# Patient Record
Sex: MALE | Race: WHITE | Employment: OTHER | URBAN - METROPOLITAN AREA
[De-identification: names, ages, dates, MRNs, and addresses within clinical notes are randomized per-mention and may not be internally consistent; named-entity substitution may affect disease eponyms.]

---

## 2017-07-26 PROCEDURE — 82607 VITAMIN B-12: CPT | Performed by: INTERNAL MEDICINE

## 2017-07-26 PROCEDURE — 82746 ASSAY OF FOLIC ACID SERUM: CPT | Performed by: INTERNAL MEDICINE

## 2021-10-05 PROBLEM — C34.90 NON-SMALL CELL LUNG CANCER (HCC): Status: ACTIVE | Noted: 2021-10-05

## 2021-10-05 PROBLEM — C90.00 MULTIPLE MYELOMA (HCC): Status: ACTIVE | Noted: 2021-10-05

## 2021-10-19 PROBLEM — C90.00 MULTIPLE MYELOMA NOT HAVING ACHIEVED REMISSION (HCC): Status: ACTIVE | Noted: 2021-10-19

## 2021-10-19 PROBLEM — C90.00 MULTIPLE MYELOMA NOT HAVING ACHIEVED REMISSION (HCC): Status: ACTIVE | Noted: 2021-01-01

## 2021-10-25 ENCOUNTER — APPOINTMENT (OUTPATIENT)
Dept: GENERAL RADIOLOGY | Facility: HOSPITAL | Age: 86
DRG: 871 | End: 2021-10-25
Attending: EMERGENCY MEDICINE
Payer: MEDICARE

## 2021-10-25 ENCOUNTER — APPOINTMENT (OUTPATIENT)
Dept: CT IMAGING | Facility: HOSPITAL | Age: 86
DRG: 871 | End: 2021-10-25
Attending: EMERGENCY MEDICINE
Payer: MEDICARE

## 2021-10-25 ENCOUNTER — HOSPITAL ENCOUNTER (INPATIENT)
Facility: HOSPITAL | Age: 86
LOS: 3 days | Discharge: HOME HEALTH | DRG: 871 | End: 2021-10-29
Attending: EMERGENCY MEDICINE | Admitting: INTERNAL MEDICINE
Payer: MEDICARE

## 2021-10-25 DIAGNOSIS — I48.91 ATRIAL FIBRILLATION WITH RVR (HCC): ICD-10-CM

## 2021-10-25 DIAGNOSIS — N39.0 URINARY TRACT INFECTION WITH HEMATURIA, SITE UNSPECIFIED: ICD-10-CM

## 2021-10-25 DIAGNOSIS — A41.9 SEPSIS DUE TO UNDETERMINED ORGANISM (HCC): ICD-10-CM

## 2021-10-25 DIAGNOSIS — R31.9 URINARY TRACT INFECTION WITH HEMATURIA, SITE UNSPECIFIED: ICD-10-CM

## 2021-10-25 DIAGNOSIS — J18.9 MULTIFOCAL PNEUMONIA: Primary | ICD-10-CM

## 2021-10-25 PROCEDURE — 3E033XZ INTRODUCTION OF VASOPRESSOR INTO PERIPHERAL VEIN, PERCUTANEOUS APPROACH: ICD-10-PCS | Performed by: EMERGENCY MEDICINE

## 2021-10-25 PROCEDURE — 71045 X-RAY EXAM CHEST 1 VIEW: CPT | Performed by: EMERGENCY MEDICINE

## 2021-10-25 PROCEDURE — 72110 X-RAY EXAM L-2 SPINE 4/>VWS: CPT | Performed by: EMERGENCY MEDICINE

## 2021-10-25 PROCEDURE — 70450 CT HEAD/BRAIN W/O DYE: CPT | Performed by: EMERGENCY MEDICINE

## 2021-10-25 RX ORDER — ACETAMINOPHEN 500 MG
1000 TABLET ORAL ONCE
Status: COMPLETED | OUTPATIENT
Start: 2021-10-25 | End: 2021-10-25

## 2021-10-25 NOTE — ED INITIAL ASSESSMENT (HPI)
PT TO ED FROM [facility],PER WIFE PT LETHARGIC, WEAKNESS, WITH CONFUSION X 3 HOURS. PER MEDICS PT FELL LAST WEEK OUT OF CHAIR AND FELL ON ALL 4'S, TWO DAYS LATER PT FELL OUT OF CHAIR AGAIN, PER FAMILY PT DID NOT HIT HEAD OR ANY LOC.  PT ON BANDAR

## 2021-10-25 NOTE — ED PROVIDER NOTES
Patient Seen in: [facility] Emergency Department      History   Patient presents with:  Altered Mental Status    Stated Complaint: ams     Subjective:   HPI    50-year-old male with past medical history of multiple myeloma, a fib on xarelto presents 1443 Oral   SpO2 10/25/21 1440 95 %   O2 Device 10/25/21 1440 None (Room air)       Current:/51   Pulse 114   Temp (!) 100.7 °F (38.2 °C) (Oral)   Resp (!) 30   Ht 182.9 cm (6')   Wt 62.1 kg   SpO2 92%   BMI 18.57 kg/m²         Physical Exam  Vitals Ratio 0.7 (*)     All other components within normal limits   LACTIC ACID, PLASMA - Abnormal; Notable for the following components:    Lactic Acid 2.3 (*)     All other components within normal limits   PROTHROMBIN TIME (PT) - Abnormal; Notable for the fol the ACR (American College of Radiology) NRDR ([address]) which includes the Dose Index Registry. PATIENT STATED HISTORY: (As transcribed by Technologist)  Patient with confusion and lethargy. FINDINGS:  No bleed or mass effect. CONCLUSION:  1. Multifocal consolidation in lower lungs could represent multifocal pneumonia. 2. Right mediastinal mass is suggested. This could be a tortuous and atherosclerotic aorta. If indicated CT would be more specific.  3. There is a right-side is from a UTI. Family also offers that patient suffered a fall off her recliner 1 week ago. He had some pain near his right SI joint. He states the pain now has improved. No CVA. Lumbar x-rays ordered to assess for bony abnormality.   Patient with no mid

## 2021-10-26 PROBLEM — I48.91 ATRIAL FIBRILLATION WITH RVR (HCC): Status: ACTIVE | Noted: 2021-01-01

## 2021-10-26 PROBLEM — R31.9 URINARY TRACT INFECTION WITH HEMATURIA, SITE UNSPECIFIED: Status: ACTIVE | Noted: 2021-01-01

## 2021-10-26 PROBLEM — R31.9 URINARY TRACT INFECTION WITH HEMATURIA, SITE UNSPECIFIED: Status: ACTIVE | Noted: 2021-10-26

## 2021-10-26 PROBLEM — A41.9 SEPSIS DUE TO UNDETERMINED ORGANISM (HCC): Status: ACTIVE | Noted: 2021-10-26

## 2021-10-26 PROBLEM — N39.0 URINARY TRACT INFECTION WITH HEMATURIA, SITE UNSPECIFIED: Status: ACTIVE | Noted: 2021-10-26

## 2021-10-26 PROBLEM — A41.9 SEPSIS DUE TO UNDETERMINED ORGANISM (HCC): Status: ACTIVE | Noted: 2021-01-01

## 2021-10-26 PROBLEM — I48.91 ATRIAL FIBRILLATION WITH RVR (HCC): Status: ACTIVE | Noted: 2021-10-26

## 2021-10-26 PROBLEM — N39.0 URINARY TRACT INFECTION WITH HEMATURIA, SITE UNSPECIFIED: Status: ACTIVE | Noted: 2021-01-01

## 2021-10-26 RX ORDER — SODIUM CHLORIDE 9 MG/ML
INJECTION, SOLUTION INTRAVENOUS ONCE
Status: DISCONTINUED | OUTPATIENT
Start: 2021-10-26 | End: 2021-10-27

## 2021-10-26 RX ORDER — SODIUM CHLORIDE 9 MG/ML
INJECTION, SOLUTION INTRAVENOUS CONTINUOUS
Status: DISCONTINUED | OUTPATIENT
Start: 2021-10-26 | End: 2021-10-26

## 2021-10-26 RX ORDER — ONDANSETRON 2 MG/ML
4 INJECTION INTRAMUSCULAR; INTRAVENOUS EVERY 4 HOURS PRN
Status: ACTIVE | OUTPATIENT
Start: 2021-10-26 | End: 2021-10-26

## 2021-10-26 RX ORDER — ACETAMINOPHEN 325 MG/1
650 TABLET ORAL EVERY 6 HOURS PRN
Status: DISCONTINUED | OUTPATIENT
Start: 2021-10-26 | End: 2021-10-29

## 2021-10-26 NOTE — CONSULTS
[address] [facility] [address] Patient Status:  Inpatient    3/11/1935 MRN [id number]   [facility] 4SW-A Attending Viviane Morton MD   Hosp Day # 0 PCP Connie Ferrari MD     Date of Admission: 10/25/2021  Admission Diagnosis: Atria use drugs.       Family History:  Family History   Problem Relation Age of Onset   • Lipids Father    • Heart Disorder Father    • Other (Other) Mother         cerebral hemorrhage; 62 yo   • Hypertension Sister    • Heart Disorder Sister          Home Medic dysuria, hesitancy, or incontinence  Musculoskeletal: denies arthralgias, myalgias, muscle weakness, or joint swelling  Skin: denies rash or pruritis; no jaundice  Neurologic: denies numbness, weakness, ataxia, tremors, or vertigo  Psychiatric: denies inso BUN 17 10/26/2021    CREATSERUM 0.62 10/26/2021    GLU 92 10/26/2021    CA 6.5 10/26/2021    ALKPHO 55 10/26/2021    ALT 20 10/26/2021    AST 12 10/26/2021    BILT 0.8 10/26/2021    ALB 1.9 10/26/2021    TP 5.0 10/26/2021     Lab Results   Component Value

## 2021-10-26 NOTE — CONSULTS
[facility]  Report of Consultation    Arielle Mcintosh Patient Status:  Inpatient    3/11/1935 MRN [id number]   [facility] 4SW-A Attending Iban Prabhakar MD   Hosp Day # 0 PCP Jenn Oseguera MD     ADMIT DATE AND TIME: 10/25/2021 never used smokeless tobacco. He reports current alcohol use. He reports that he does not use drugs.     Allergies:  No Known Allergies    Medications:    Current Facility-Administered Medications:   •  0.9% NaCl infusion, , Intravenous, Continuous  •  nore DRAW LAVENDER    Collection Time: 10/25/21  2:46 PM   Result Value Ref Range    Hold Lavender Auto Resulted    CBC W/ DIFFERENTIAL    Collection Time: 10/25/21  2:46 PM   Result Value Ref Range    WBC 12.4 (H) 4.0 - 11.0 x10(3) uL    RBC 2.87 (L) 3.80 - 5. 3.5 - 5.1 mmol/L    Chloride 102 98 - 112 mmol/L    CO2 24.0 21.0 - 32.0 mmol/L    Anion Gap 5 0 - 18 mmol/L    BUN 24 (H) 7 - 18 mg/dL    Creatinine 1.16 0.70 - 1.30 mg/dL    Calcium, Total 7.8 (L) 8.5 - 10.1 mg/dL    Calculated Osmolality 277 275 - 295 m Value Ref Range    ABG pH 7.45 7.35 - 7.45    ABG pCO2 28 (L) 35 - 45 mm Hg    ABG pO2 80 80 - 105 mm Hg    ABG HCO3 18.6 (L) 22.0 - 26.0 mEq/L    ABG Base Excess -4.4 (L) -2.0 - 2.0 mmol/L    ABG O2 Saturation 94 92 - 100 %    Calculated O2 Saturation 96 450.0 10(3)uL    .8 (H) 80.0 - 100.0 fL    MCH 33.9 26.0 - 34.0 pg    MCHC 33.3 31.0 - 37.0 g/dL    RDW 19.2 %    Neutrophil Absolute Prelim 11.13 (H) 1.50 - 7.70 x10 (3) uL    Neutrophil Absolute 11.13 (H) 1.50 - 7.70 x10(3) uL    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 Negative for acute intracranial process.   Incidental right frontal convexity meningioma measuring 1.0 x 0.7 cm felt to be of no clinical signifi itself. IgG is 1466 with M protein of 1 gm. Thus the normal IgG is low. On pressors and antibiotics  Cultures negative thus far   If infection continues he can benefit from IVIG     3.  Anemia - related to multiple myeloma and its treatment   Hemoglobin 7

## 2021-10-26 NOTE — CONSULTS
INFECTIOUS DISEASE CONSULT NOTE    Misa Draper Patient Status:  Inpatient    3/11/1935 MRN [id number]   [facility] 4SW-A Attending Tang Anglin MD   Hosp Day # 0 PCP Kailee Espinosa smokeless tobacco. He reports current alcohol use. He reports that he does not use drugs.     Allergies:  No Known Allergies    Medications:    Current Facility-Administered Medications:   •  0.9% NaCl infusion, , Intravenous, Continuous  •  Piperacillin So sounds. Musculoskeletal: Full range of motion of all extremities. No arthritis or deformity  Integument: No rash. No open wounds.     Laboratory Data:    Recent Labs   Lab 10/25/21  1446 10/26/21  0500 10/26/21  1135   RBC 2.87* 2.18* 2.15*   HGB 9.7* 7.4 INDICATIONS:  ams, fall  PATIENT STATED HISTORY: (As transcribed by Technologist)  Patient offered no additional history at this time. FINDINGS:    BONES:  Moderate levoscoliosis. There is multiple level facet disease.   There is some compression deform Negative for acute intracranial process. Incidental right frontal convexity meningioma measuring 1.0 x 0.7 cm felt to be of no clinical significance. Incidental 0.6 by 3.4 x 0.7 cm right frontal scalp lipoma.    Dictated by (CST): Quincy Horowitz MD on 10 to remove port  -check renal US  -check PVR  -await final cx and adjust abx as needed  -follow temps and wbc  Case and plan d/w pt and staff  Thank you for allowing me to participate in the care of this patient.  Please do not hesitate to call if you have a

## 2021-10-26 NOTE — PROGRESS NOTES
[facility] Hospitalist Progress Note     PCP: Lenore Kan MD    Chief Complaint: follow-up    Overnight/Interim Events:      SUBJECTIVE:  Much more alert and cheerful today. 100/53, 98. No sob/cough, pt denies having prior.  No dysuria but wife notes increased 4.0 3.3*    102 111   CO2 25.5 24.0 22.0   BUN 18.0 24* 17   CREATSERUM 0.64* 1.16 0.62*   CA  --  7.8* 6.5*   * 122* 92       Recent Labs   Lab 10/19/21  1454 10/25/21  1447 10/26/21  0500   ALT 18 21 20   AST 12 18 12*   ALB 3.9 2.5* 1.9*

## 2021-10-26 NOTE — ED QUICK NOTES
Spoke w/ rn charge, bed dirty, pt assigned to room 457, will f/u w/ rn trevor in 15 mins to give report, rn just given bed

## 2021-10-26 NOTE — PLAN OF CARE
Received patient from the ER around [facility]. Patient A/O x4. No Levo or vasopressors running. On RA. Daughter with patient at bedside through the night. Updated on the POC.

## 2021-10-26 NOTE — CM/SW NOTE
10/26/21 1000   CM/SW Referral Data   Referral Source Social Work (self-referral)   Reason for Referral Discharge planning   Informant Patient;Daughter   Pertinent Medical Hx   Does patient have an established PCP?  Yes   Patient Info   Patient's Current

## 2021-10-26 NOTE — ED QUICK NOTES
Patient handoff given to [facility]. Attempted report to the floor and ICU RN unavailable, will return call.

## 2021-10-26 NOTE — H&P
General Medicine H&P     Patient presents with:  Altered Mental Status       PCP: Nomi Hanson MD     Pt seen and examined 10/25/2021    History of Present Illness: Patient is a 81year old male with PMH including but not limited to prostate ca, MM, afib negative except for what's stated above.  \    OBJECTIVE:  /60   Pulse 94   Temp 97.2 °F (36.2 °C) (Temporal)   Resp 23   Ht 6' (1.829 m)   Wt 136 lb 14.5 oz (62.1 kg)   SpO2 99%   BMI 18.57 kg/m²     General:  Lathargic, mod distress, appears stated levoscoliosis. There is multiple level facet disease. There is some compression deformity of uncertain age involving the L2 and L4 superior endplates without posterior expansion.  DISC SPACES:  There is severe degenerative disc disease all levels, most se by 3.4 x 0.7 cm right frontal scalp lipoma.    Dictated by (CST): Yashira Gross MD on 10/25/2021 at 3:53 PM     Finalized by (CST): Yashira Gross MD on 10/25/2021 at 3:59 PM       XR CHEST AP PORTABLE  (CPT=71045)    Result Date: 10/25/2021  PROCEDURE:  X alert and awake, can try diet, wife would like him to have food      # Proph  - scd    Wife confirmed DNR, d/w family, will ask her to bring in paperwork    Outpatient records or previous hospital records reviewed.  [facility] hospitalist to continue to follow pat

## 2021-10-26 NOTE — PLAN OF CARE
Patient continuously monitored on telemetry. Vitals recorded every hour. Medications given per MAR. Patient updated with Plan of Care and education given as needed.       Patient's home supplied chemo medication brought in from home and verified by pharma

## 2021-10-27 ENCOUNTER — APPOINTMENT (OUTPATIENT)
Dept: ULTRASOUND IMAGING | Facility: HOSPITAL | Age: 86
DRG: 871 | End: 2021-10-27
Attending: INTERNAL MEDICINE
Payer: MEDICARE

## 2021-10-27 PROCEDURE — 99222 1ST HOSP IP/OBS MODERATE 55: CPT | Performed by: INTERNAL MEDICINE

## 2021-10-27 PROCEDURE — 30233N1 TRANSFUSION OF NONAUTOLOGOUS RED BLOOD CELLS INTO PERIPHERAL VEIN, PERCUTANEOUS APPROACH: ICD-10-PCS | Performed by: INTERNAL MEDICINE

## 2021-10-27 PROCEDURE — 76770 US EXAM ABDO BACK WALL COMP: CPT | Performed by: INTERNAL MEDICINE

## 2021-10-27 RX ORDER — LOPERAMIDE HYDROCHLORIDE 2 MG/1
2 CAPSULE ORAL 4 TIMES DAILY PRN
Status: DISCONTINUED | OUTPATIENT
Start: 2021-10-27 | End: 2021-10-29

## 2021-10-27 RX ORDER — POTASSIUM CHLORIDE 14.9 MG/ML
20 INJECTION INTRAVENOUS ONCE
Status: DISCONTINUED | OUTPATIENT
Start: 2021-10-27 | End: 2021-10-27

## 2021-10-27 RX ORDER — GARLIC EXTRACT 500 MG
1 CAPSULE ORAL DAILY
Status: DISCONTINUED | OUTPATIENT
Start: 2021-10-27 | End: 2021-10-29

## 2021-10-27 RX ORDER — SODIUM CHLORIDE 9 MG/ML
INJECTION, SOLUTION INTRAVENOUS ONCE
Status: COMPLETED | OUTPATIENT
Start: 2021-10-27 | End: 2021-10-27

## 2021-10-27 RX ORDER — POTASSIUM CHLORIDE 14.9 MG/ML
20 INJECTION INTRAVENOUS ONCE
Status: COMPLETED | OUTPATIENT
Start: 2021-10-27 | End: 2021-10-27

## 2021-10-27 NOTE — PROGRESS NOTES
[facility]  Progress Note    Aaron Garg Patient Status:  Inpatient    3/11/1935 MRN [id number]   [facility] 4SW-A Attending Charles Kahn MD   Hosp Day # 1 PCP Marycruz Bowman MD     ADMISSION DATE AND TIME: 10/25/2021  2:31 Calculated Osmolality 292 275 - 295 mOsm/kg    GFR, Non- 87 >=60    GFR, -American 101 >=60    AST 12 (L) 15 - 37 U/L    ALT 23 16 - 61 U/L    Alkaline Phosphatase 55 45 - 117 U/L    Bilirubin, Total 0.5 0.1 - 2.0 mg/dL    Total Prot Expiration Date [id number]     Blood Type Barcode 9500        Hospital Encounter on 10/25/21   1.  Urine Culture, Routine     Status: Abnormal (Preliminary result)    Collection Time: 10/25/21  3:35 PM    Specimen: Urine, clean catch   Result Value Ref Ra

## 2021-10-27 NOTE — PROGRESS NOTES
10/27/21 1040   Clinical Encounter Type   Referral To Nurse  ( spoke with APN Leonce Seip who will follow up with patient for Code Status conversation with patient.   remains available for POLST completion if needed.)

## 2021-10-27 NOTE — PROGRESS NOTES
[facility] Hospitalist Progress Note     PCP: Shelton Perez MD    Chief Complaint: follow-up    Overnight/Interim Events:      SUBJECTIVE:  more alert, without marked sxs. 117/64. Got unit prbc,. about to get u/s    OBJECTIVE:  Temp:  [97.1 °F (36.2 °C)-98.9 °F 111 113*   CO2 24.0 22.0 24.0   BUN 24* 17 15   CREATSERUM 1.16 0.62* 0.68*   CA 7.8* 6.5* 6.8*   MG  --   --  2.2   PHOS  --   --  1.7*   * 92 91       Recent Labs   Lab 10/25/21  1447 10/26/21  0500 10/27/21  0424   ALT 21 20 23   AST 18 12* 12*

## 2021-10-27 NOTE — PLAN OF CARE
Patient received resting in bed. Up to chair for breakfast. Ambulating in room and hallway with standard walker and contact guard/steadying assist. Afebrile. Hemodynamically stable. 1 unit PRBC transfused without complication. Loose stools--Cdiff negative.

## 2021-10-27 NOTE — OCCUPATIONAL THERAPY NOTE
OCCUPATIONAL THERAPY EVALUATION - INPATIENT     Room Number: 457/457-A  Evaluation Date: 10/27/2021  Type of Evaluation: Initial  Presenting Problem: septic shock, UTI, A-fib,suspected pneumonia    Physician Order: IP Consult to Occupational Therapy  Reaso One level  Lives With: Spouse    Toilet and Equipment: Comfort height toilet;Grab bar  Shower/Tub and Equipment: Walk-in shower;Grab bar; Shower chair             Drives: Yes       Prior Level of Function: Pt and his wife just moved here from [place].  They Supervision dynamic sitting balance while pt himself held multiple lines in his hands. CGA to stand. Pt maintained static standing balance with RW while therapist adjusted his gown. Ambulated with RW and PT. 98% room air, HR 95. SBP in mid 120s. 3x/week  Number of Visits to Meet Established Goals: 5    ADL Goals   Patient will perform grooming: with modified independent  Patient will perform upper body dressing:  with modified independent  Patient will perform lower body dressing:  with supervisio

## 2021-10-27 NOTE — PHYSICAL THERAPY NOTE
PHYSICAL THERAPY EVALUATION - INPATIENT     Room Number: 425/425-A  Evaluation Date: 10/27/2021  Type of Evaluation: Initial  Physician Order: PT Eval and Treat    Presenting Problem: AMS, UTI  Reason for Therapy: Mobility Dysfunction and Discharge P RESTRICTION  Weight Bearing Restriction: None                PAIN ASSESSMENT  Ratin          COGNITION  · Overall Cognitive Status:  WFL - within functional limits    RANGE OF MOTION AND STRENGTH ASSESSMENT  Upper extremity ROM and strength are within step through gait pattern and slow ruth. Pt educated on energy conservation. Pt declines sitting up in chair. Pt returned to supine in bed without assistance. Pt educated on POC and activity recommendations.  Pt left with call light in reach and alarm do Established Goals: 3      CURRENT GOALS    Goal #1 Patient is able to demonstrate supine - sit EOB @ level: modified independent- MET     Goal #2 Patient is able to demonstrate transfers EOB to/from [facility] at assistance level: supervision     Goal #3 Patient

## 2021-10-27 NOTE — PLAN OF CARE
Assumed care following RN report with patient resting comfortably in bed. A&Ox4. Maintaining saturations on RA. Denies sob, difficulty breathing or pain. Voiding via urinal with adequate UO. Confirmed with family to hold PM dose of Revlimid.  AM hemoglobin

## 2021-10-27 NOTE — PROGRESS NOTES
[address] [facility] [address] Patient Status:  Inpatient    3/11/1935 MRN [id number]   [facility] 4SW-A Attending Henry Lima MD   Hosp Day # 1 PCP Lillian Parsons MD     Critical Care Progress Note     Date of Admission: 10/25/ crackles                           Chest wall: No tenderness or deformity.                         VNBVJ: SYVQESP rate and rhythm, normal S1S2                          Abdomen: soft, non-tender, non-distended, positive BS.   Jessi Doe

## 2021-10-27 NOTE — CM/SW NOTE
[facility] referral started for [facility] referral and pending.  sw to follow

## 2021-10-27 NOTE — CM/SW NOTE
Friend asking for radiology report from bladder and kidney scanner. Informed RN of this. Informed friend SW would need to speak w/pt and pt's family. Talked to wife about HH. [facility] and [facility] [address] [facility].  Explaine

## 2021-10-27 NOTE — PROGRESS NOTES
10/27/21 0933   Clinical Encounter Type   Visited With Health care provider   Referral To Nurse   Patient's current code status is \"Full Code. \"  After a physician's order for DNAR has been entered into Epic, please enter a POLST consult in [address], [address]

## 2021-10-27 NOTE — PROGRESS NOTES
INFECTIOUS DISEASE PROGRESS NOTE    Kvng Lighter Patient Status:  Inpatient    3/11/1935 MRN [id number]   [facility] 4SW-A Attending Simone Lamb MD   [facility] Day # 1 PCP Karri Franklin sounds. Musculoskeletal: Full range of motion of all extremities. No arthritis or deformity  Integument: No rash. No open wounds. Laboratory Data:    Recent Labs   Lab 10/25/21  1446 10/25/21  1446 10/26/21  0500 10/26/21  1135 10/27/21  0424   RBC 2. Susceptibility    Pseudomonas aeruginosa -  (no method available)     Cefepime <=2 Sensitive      Ceftazidime 2 Sensitive      Ciprofloxacin <=1 Sensitive      Gentamicin 4 Sensitive      Meropenem <=1 Sensitive      Levofloxacin 0.5 Sensitive      P infarction. There is moderate generalized subcortical microvascular disease involving the centrum semiovale and coronal radiata bilaterally. There is generalized cortical atrophy. No extra cerebral collection.   Incidental note is made, however, of a rig (CST): Emmy Maier MD on 10/25/2021 at 3:31 PM     Finalized by (CST): Emmy Maier MD on 10/25/2021 at 3:33 PM          ASSESSMENT:  1. PSAR septic shock- of  origin  a. Also has port but also with PSAR in Order Mapper so assume this is the source.  Exam is no

## 2021-10-27 NOTE — CONSULTS
Jaclyn Patient Status:  Inpatient    3/11/1935 MRN [id number]   [facility] 4NW-A Attending Christopher Arenas MD   Hosp Day # 1 PCP Joanna Torres MD     Date of Consult: 10/27/2 treatment. --pt's goal at this time is to get better and stronger. Wife mentioned that they would like home health services. --discussed code status: pt wish to remain Full code.    --wife mentioned that pt has living will and health care power of attor Normal or reduced       Full                                   Some Disease      70    Reduced          Can't Perform Job     Full                   Normal or reduced       Full                                  Some Disease    60    Reduced          Can' Component Value Date    WBC 9.1 10/27/2021    HGB 7.2 (L) 10/27/2021    HCT 22.6 (L) 10/27/2021    .0 10/27/2021       Coags:  Lab Results   Component Value Date    INR 1.78 (H) 10/26/2021    PTT 43.5 (H) 10/26/2021       Chemistry:  Lab Results XR CHEST AP PORTABLE  (CPT=71045)    Result Date: 10/25/2021  CONCLUSION:  1. Multifocal consolidation in lower lungs could represent multifocal pneumonia. 2. Right mediastinal mass is suggested. This could be a tortuous and atherosclerotic aorta.   If support to family. Discussed today's visit with floor RN . We will sign off. Please re-consult if further assistance is needed. Thank you for allowing Palliative Care services to participate in the care of Mr. Theodore Thomas.        Lalo Rodriguez MD  10

## 2021-10-27 NOTE — PLAN OF CARE
Patient A&0x4. Pt received 1 unit of PRBCs in ICU. Pt is due for a re-draw @ 1800. Pt is on tele and cont pulse ox. Pt receiving IV zosyn. Pt reports loose stools. Cdiff neg. US kidneys/bladder was completed. PT uses urinal to void.  Family member requests

## 2021-10-28 ENCOUNTER — APPOINTMENT (OUTPATIENT)
Dept: GENERAL RADIOLOGY | Facility: HOSPITAL | Age: 86
DRG: 871 | End: 2021-10-28
Attending: INTERNAL MEDICINE
Payer: MEDICARE

## 2021-10-28 PROCEDURE — 71045 X-RAY EXAM CHEST 1 VIEW: CPT | Performed by: INTERNAL MEDICINE

## 2021-10-28 RX ORDER — CIPROFLOXACIN 2 MG/ML
400 INJECTION, SOLUTION INTRAVENOUS EVERY 12 HOURS
Status: DISCONTINUED | OUTPATIENT
Start: 2021-10-28 | End: 2021-10-29

## 2021-10-28 RX ORDER — FUROSEMIDE 10 MG/ML
20 INJECTION INTRAMUSCULAR; INTRAVENOUS ONCE
Status: COMPLETED | OUTPATIENT
Start: 2021-10-28 | End: 2021-10-28

## 2021-10-28 NOTE — PROGRESS NOTES
[facility] Hospitalist Progress Note     PCP: Vivi Lundberg MD    Chief Complaint: follow-up    Overnight/Interim Events:      SUBJECTIVE:  Doing well no chest pain or shortness of breath conversation much more alert and awake    OBJECTIVE:  Temp:  [98.1 °F (36 182.0   INR  --   --  1.74* 1.78*  --   --   --   --     < > = values in this interval not displayed.        Recent Labs   Lab 10/25/21  1447 10/26/21  0500 10/27/21  0424 10/27/21  1749 10/28/21  0608   * 137 141  --  140   K 4.0 3.3* 3.1* 3.7 3.2* hgb, 7.4, no current overt s/s bleeding/melena; later to 7.0; given 1u prbc 10/27  -Hemoglobin has been stable posttransfusion    # Diarrhea  - c diff neg   - probiotic requested    # Afib  - resume BB as BPs better   - xarelto when hgb stable and no s/s b

## 2021-10-28 NOTE — CM/SW NOTE
Family requesting for PT to see the pt two times per day. Informed them he was seen yesterday and that they usually come every other day.  Checking with RN to see if PT will be able to come

## 2021-10-28 NOTE — PROGRESS NOTES
INFECTIOUS DISEASE PROGRESS NOTE    Nancy Brooks Patient Status:  Inpatient    3/11/1935 MRN [id number]   [facility] 4SW-A Attending Brett Lin MD   [facility] Day # 2 PCP Irma Doan Data:    Recent Labs   Lab 10/26/21  0500 10/26/21  0500 10/26/21  1135 10/26/21  1135 10/27/21  0424 10/27/21  1749 10/28/21  0559   RBC 2.18*   < > 2.15*  --  2.19*  --  2.89*   HGB 7.4*   < > 7.0*   < > 7.2* 8.4* 9.2*   HCT 22.2*   < > 21.5*  --  22.6* N/A   2. Urine Culture, Routine     Status: Abnormal    Collection Time: 10/25/21  3:35 PM    Specimen: Urine, clean catch   Result Value Ref Range    Urine Culture 50,000-99,000 CFU/ML Pseudomonas aeruginosa (A) N/A       Susceptibility    Pseudomonas aer Index Registry. PATIENT STATED HISTORY: (As transcribed by Technologist)  Patient with confusion and lethargy. FINDINGS:  No bleed or mass effect. There is no evidence of acute territorial ischemia or infarction.   There is moderate generalized subcort mediastinal mass is suggested. This could be a tortuous and atherosclerotic aorta. If indicated CT would be more specific. 3. There is a right-sided chest port with catheter tip in region of SVC.     Dictated by (CST): Belem Martin MD on 10/25/2021 at 3:

## 2021-10-28 NOTE — PROGRESS NOTES
[facility]  Progress Note    Robe Abebe Patient Status:  Inpatient    3/11/1935 MRN [id number]   [facility] 4SW-A Attending Chelly Mazariegos MD   Hosp Day # 2 PCP Rosalio Quispe MD     ADMISSION DATE AND TIME: 10/25/2021  2:31 x10(3) uL    Lymphocyte Absolute 0.55 (L) 1.00 - 4.00 x10(3) uL    Monocyte Absolute 0.79 0.10 - 1.00 x10(3) uL    Eosinophil Absolute 0.09 0.00 - 0.70 x10(3) uL    Basophil Absolute 0.03 0.00 - 0.20 x10(3) uL    Immature Granulocyte Absolute 0.08 0.00 - 1 Ceftazidime 2 Sensitive      Ciprofloxacin <=1 Sensitive      Gentamicin 4 Sensitive      Meropenem <=1 Sensitive      Levofloxacin 0.5 Sensitive      Piperacillin + Tazobactam <=4 Sensitive        IMAGING:    US KIDNEY/BLADDER (BHC=58896)    Result Date:

## 2021-10-28 NOTE — PROGRESS NOTES
[address] [facility] - [address] Patient Status:  Inpatient    3/11/1935 MRN [id number]   [facility] 4SW-A Attending Avinash Cummins MD   [facility] Day # 2 PCP Vicenta Davis MD     Critical Care Progress Note     Date of Admission: 10/25/ deformity.                         RBOMK: IAVKEXG rate and rhythm, normal S1S2                          Abdomen: soft, non-tender, non-distended, positive BS.                         Extremity: No clubbing or cyanosis.  no edema                          Y

## 2021-10-28 NOTE — PROGRESS NOTES
Axox4. Meds given. PRN loperamide given for loose stool. Denies pain. Needs met. Daughter at bedside.

## 2021-10-28 NOTE — PROGRESS NOTES
Patient has been up ambulating with a steady gait. Staff walked with patient a couple of times today. Family has been at bedside this shift updated on the plan of care. Appetite has been good denies any gi distress. Denies any c/o pain.

## 2021-10-28 NOTE — HOME CARE LIAISON
Patient's spouse Vee Booth provided with list of [facility] providers from [address] [place] [address], patient choice is [address]. Agency reserved in [address] [place] [address] and contact information placed on AVS. Financial interest disclosure provided to patient. ANT Adorno updated.

## 2021-10-29 VITALS
OXYGEN SATURATION: 96 % | WEIGHT: 135.81 LBS | BODY MASS INDEX: 18.39 KG/M2 | DIASTOLIC BLOOD PRESSURE: 73 MMHG | SYSTOLIC BLOOD PRESSURE: 140 MMHG | RESPIRATION RATE: 27 BRPM | TEMPERATURE: 98 F | HEIGHT: 72 IN | HEART RATE: 80 BPM

## 2021-10-29 RX ORDER — POTASSIUM CHLORIDE 20 MEQ/1
40 TABLET, EXTENDED RELEASE ORAL ONCE
Status: COMPLETED | OUTPATIENT
Start: 2021-10-29 | End: 2021-10-29

## 2021-10-29 RX ORDER — DOXEPIN HYDROCHLORIDE 50 MG/1
1 CAPSULE ORAL DAILY
Status: DISCONTINUED | OUTPATIENT
Start: 2021-10-29 | End: 2021-10-29

## 2021-10-29 RX ORDER — CIPROFLOXACIN 500 MG/1
500 TABLET, FILM COATED ORAL 2 TIMES DAILY
Qty: 22 TABLET | Refills: 0 | Status: SHIPPED | OUTPATIENT
Start: 2021-10-29 | End: 2021-11-09

## 2021-10-29 NOTE — PLAN OF CARE
A/o x4. Afebrile. Ambulates in halls, steady gait with a walker, standby assist. Denies pain . IV cipro given, no adverse reaction noted. Slept well.    Problem: CARDIOVASCULAR - ADULT  Goal: Absence of cardiac arrhythmias or at baseline  Description: INTER

## 2021-10-29 NOTE — PHYSICAL THERAPY NOTE
PHYSICAL THERAPY TREATMENT NOTE - INPATIENT    Room Number: 425/425-A     Session: 1  Number of Visits to Meet Established Goals: 3    Presenting Problem: AMS, UTI      History related to current admission: Pt is a 81 y.o. male admitted 10/25/21 with AMS (including adjusting bedclothes, sheets and blankets)?: None   -   Sitting down on and standing up from a chair with arms (e.g., wheelchair, bedside commode, etc.): None   -   Moving from lying on back to sitting on the side of the bed?: None   How much he Treatment Plan: Bed mobility; Endurance; Energy conservation;Patient education;Gait training;Strengthening;Transfer training;Balance training  Rehab Potential : Good  Frequency (Obs): 3-5x/week    CURRENT GOALS     Goal #1 Patient is able to demonstrate supi

## 2021-10-29 NOTE — PROGRESS NOTES
[facility] Hospitalist Progress Note     PCP: Samuel Ayoub MD    Chief Complaint: follow-up    Overnight/Interim Events:      SUBJECTIVE:  No c/o    OBJECTIVE:  Temp:  [97.7 °F (36.5 °C)-98.3 °F (36.8 °C)] 97.7 °F (36.5 °C)  Pulse:  [72-97] 72  Resp:  [16-28] INR  --  1.74* 1.78*  --   --   --   --   --   --     < > = values in this interval not displayed.        Recent Labs   Lab 10/25/21  1447 10/25/21  1447 10/26/21  0500 10/27/21  0424 10/27/21  1749 10/28/21  0608 10/29/21  0614   *  --  137 141  -- 0 [facility]

## 2021-10-29 NOTE — PLAN OF CARE
Patient care tech walked patient in the halls w/ patient's daughter w/ rolling walker. Patient tolerated well. Slow but w/ steady gait & balance. PT also came & walked patient. Same recommendations & patient is stable for discharge.  PT also issued a work or

## 2021-10-29 NOTE — CM/SW NOTE
10/29/21 1400   Discharge disposition   Expected discharge disposition Home or Self   Post Acute Care Provider [facility] aware the pt will dc today

## 2021-10-29 NOTE — PLAN OF CARE
Awake & alert,reesting in bed at this time. Ambulating in the room w/ walker. Tolerating IV Cipro w/ no adverse reactions. Clear for discharge per Dr Mariel Mead/ID since culture is negative at this time.  Patient informed as well that per Dr Keny Ivan

## 2021-10-29 NOTE — PROGRESS NOTES
[facility]  Progress Note    Minorduke FrancesFleischer Patient Status:  Inpatient    3/11/1935 MRN [id number]   [facility] 4SW-A Attending Ru Gonzalez MD   Hosp Day # 3 PCP Vivi Lundberg MD     ADMISSION DATE AND TIME: 10/25/2021  2:31 mg/dL   CBC W/ DIFFERENTIAL    Collection Time: 10/29/21  6:14 AM   Result Value Ref Range    WBC 7.3 4.0 - 11.0 x10(3) uL    RBC 2.80 (L) 3.80 - 5.80 x10(6)uL    HGB 8.5 (L) 13.0 - 17.5 g/dL    HCT 27.1 (L) 39.0 - 53.0 %    .0 150.0 - 450.0 10(3)uL (CST): Chaz Park MD on 10/28/2021 at 2:06 PM     Finalized by (CST): Chaz Park MD on 10/28/2021 at 2:07 PM           MEDICATIONS:  Medications reviewed.     • ciprofloxacin  400 mg Intravenous Q12H   • metoprolol tartrate  25 mg Oral BID   • A

## 2021-10-29 NOTE — PROGRESS NOTES
INFECTIOUS DISEASE PROGRESS NOTE    Ana Fleischer Patient Status:  Inpatient    3/11/1935 MRN [id number]   [facility] 4SW-A Attending Ru Gonzalez MD   [facility] Day # 3 PCP Trenton So motion of all extremities. No arthritis or deformity  Integument: No rash. No open wounds.     Laboratory Data:    Recent Labs   Lab 10/27/21  0424 10/27/21  0424 10/27/21  1749 10/28/21  0559 10/29/21  0614   RBC 2.19*  --   --  2.89* 2.80*   HGB 7.2*   < Abnormal    Collection Time: 10/25/21  3:35 PM    Specimen: Urine, clean catch   Result Value Ref Range    Urine Culture 50,000-99,000 CFU/ML Pseudomonas aeruginosa (A) N/A       Susceptibility    Pseudomonas aeruginosa -  (no method available)     Cefepim (As transcribed by Technologist)  Patient with confusion and lethargy. FINDINGS:  No bleed or mass effect. There is no evidence of acute territorial ischemia or infarction.   There is moderate generalized subcortical microvascular disease involving the tortuous and atherosclerotic aorta. If indicated CT would be more specific. 3. There is a right-sided chest port with catheter tip in region of SVC.     Dictated by (CST): Lauren Robertson MD on 10/25/2021 at 3:31 PM     Finalized by (CST): Lauren Robertson MD o

## 2021-10-29 NOTE — PLAN OF CARE
Patent was visited in the room & he is lying in bed awake. He was informed that he will be discharged today as it was agreed upon this morning with the other doctors,ID & attending. Patient's spouse told the nurse patient hasn't walked by staff yet since [facility]

## 2021-10-30 NOTE — DISCHARGE SUMMARY
General Medicine Discharge Summary     Patient ID:  Kvng Charles  81year old  3/11/1935    Admit date: 10/25/2021    Discharge date and time: 10/29/2021  6:25 PM     Attending Physician: No att. providers found     Consults: IP CONSULT TO PULMONOLOGY Med list     Medication List      START taking these medications    ciprofloxacin 500 MG Tabs  Commonly known as: CIPRO  Take 1 tablet (500 mg total) by mouth 2 (two) times daily for 11 days.         CONTINUE taking these medications    apixaban 5 MG Ta

## 2021-11-13 ENCOUNTER — LAB ENCOUNTER (OUTPATIENT)
Dept: LAB | Facility: HOSPITAL | Age: 86
End: 2021-11-13
Attending: INTERNAL MEDICINE
Payer: MEDICARE

## 2021-11-13 DIAGNOSIS — R93.89 ABNORMAL CT OF THE CHEST: ICD-10-CM

## 2021-11-15 ENCOUNTER — ANESTHESIA EVENT (OUTPATIENT)
Dept: ENDOSCOPY | Facility: HOSPITAL | Age: 86
End: 2021-11-15
Payer: MEDICARE

## 2021-11-16 ENCOUNTER — HOSPITAL ENCOUNTER (OUTPATIENT)
Facility: HOSPITAL | Age: 86
Setting detail: HOSPITAL OUTPATIENT SURGERY
Discharge: HOME | End: 2021-11-16
Attending: INTERNAL MEDICINE | Admitting: INTERNAL MEDICINE
Payer: MEDICARE

## 2021-11-16 ENCOUNTER — ANESTHESIA (OUTPATIENT)
Dept: ENDOSCOPY | Facility: HOSPITAL | Age: 86
End: 2021-11-16
Payer: MEDICARE

## 2021-11-16 VITALS
TEMPERATURE: 97 F | HEART RATE: 88 BPM | SYSTOLIC BLOOD PRESSURE: 107 MMHG | OXYGEN SATURATION: 98 % | WEIGHT: 134 LBS | DIASTOLIC BLOOD PRESSURE: 59 MMHG | RESPIRATION RATE: 16 BRPM | BODY MASS INDEX: 18.76 KG/M2 | HEIGHT: 71 IN

## 2021-11-16 DIAGNOSIS — R93.89 ABNORMAL CT OF THE CHEST: Primary | ICD-10-CM

## 2021-11-16 DIAGNOSIS — C90.00 MYELOMA (HCC): ICD-10-CM

## 2021-11-16 PROCEDURE — 88104 CYTOPATH FL NONGYN SMEARS: CPT | Performed by: INTERNAL MEDICINE

## 2021-11-16 PROCEDURE — 87116 MYCOBACTERIA CULTURE: CPT | Performed by: INTERNAL MEDICINE

## 2021-11-16 PROCEDURE — 87206 SMEAR FLUORESCENT/ACID STAI: CPT | Performed by: INTERNAL MEDICINE

## 2021-11-16 PROCEDURE — 85730 THROMBOPLASTIN TIME PARTIAL: CPT

## 2021-11-16 PROCEDURE — BB4CZZZ ULTRASONOGRAPHY OF MEDIASTINUM: ICD-10-PCS | Performed by: INTERNAL MEDICINE

## 2021-11-16 PROCEDURE — 0B9F8ZX DRAINAGE OF RIGHT LOWER LUNG LOBE, VIA NATURAL OR ARTIFICIAL OPENING ENDOSCOPIC, DIAGNOSTIC: ICD-10-PCS | Performed by: INTERNAL MEDICINE

## 2021-11-16 PROCEDURE — 07D78ZX EXTRACTION OF THORAX LYMPHATIC, VIA NATURAL OR ARTIFICIAL OPENING ENDOSCOPIC, DIAGNOSTIC: ICD-10-PCS | Performed by: INTERNAL MEDICINE

## 2021-11-16 PROCEDURE — 85610 PROTHROMBIN TIME: CPT

## 2021-11-16 PROCEDURE — 87102 FUNGUS ISOLATION CULTURE: CPT | Performed by: INTERNAL MEDICINE

## 2021-11-16 PROCEDURE — 88172 CYTP DX EVAL FNA 1ST EA SITE: CPT | Performed by: INTERNAL MEDICINE

## 2021-11-16 PROCEDURE — 87071 CULTURE AEROBIC QUANT OTHER: CPT | Performed by: INTERNAL MEDICINE

## 2021-11-16 PROCEDURE — 87305 ASPERGILLUS AG IA: CPT | Performed by: INTERNAL MEDICINE

## 2021-11-16 PROCEDURE — 87205 SMEAR GRAM STAIN: CPT | Performed by: INTERNAL MEDICINE

## 2021-11-16 PROCEDURE — 89051 BODY FLUID CELL COUNT: CPT | Performed by: ANESTHESIOLOGY

## 2021-11-16 PROCEDURE — 87798 DETECT AGENT NOS DNA AMP: CPT | Performed by: INTERNAL MEDICINE

## 2021-11-16 PROCEDURE — 88173 CYTOPATH EVAL FNA REPORT: CPT | Performed by: INTERNAL MEDICINE

## 2021-11-16 PROCEDURE — 88305 TISSUE EXAM BY PATHOLOGIST: CPT | Performed by: INTERNAL MEDICINE

## 2021-11-16 RX ORDER — ONDANSETRON 2 MG/ML
INJECTION INTRAMUSCULAR; INTRAVENOUS AS NEEDED
Status: DISCONTINUED | OUTPATIENT
Start: 2021-11-16 | End: 2021-11-16 | Stop reason: SURG

## 2021-11-16 RX ORDER — DEXAMETHASONE SODIUM PHOSPHATE 4 MG/ML
VIAL (ML) INJECTION AS NEEDED
Status: DISCONTINUED | OUTPATIENT
Start: 2021-11-16 | End: 2021-11-16 | Stop reason: SURG

## 2021-11-16 RX ORDER — MEPERIDINE HYDROCHLORIDE 25 MG/ML
12.5 INJECTION INTRAMUSCULAR; INTRAVENOUS; SUBCUTANEOUS AS NEEDED
Status: DISCONTINUED | OUTPATIENT
Start: 2021-11-16 | End: 2021-11-16 | Stop reason: HOSPADM

## 2021-11-16 RX ORDER — METOCLOPRAMIDE HYDROCHLORIDE 5 MG/ML
10 INJECTION INTRAMUSCULAR; INTRAVENOUS AS NEEDED
Status: DISCONTINUED | OUTPATIENT
Start: 2021-11-16 | End: 2021-11-16 | Stop reason: HOSPADM

## 2021-11-16 RX ORDER — HYDROMORPHONE HYDROCHLORIDE 1 MG/ML
0.4 INJECTION, SOLUTION INTRAMUSCULAR; INTRAVENOUS; SUBCUTANEOUS EVERY 5 MIN PRN
Status: DISCONTINUED | OUTPATIENT
Start: 2021-11-16 | End: 2021-11-16 | Stop reason: HOSPADM

## 2021-11-16 RX ORDER — NALOXONE HYDROCHLORIDE 0.4 MG/ML
80 INJECTION, SOLUTION INTRAMUSCULAR; INTRAVENOUS; SUBCUTANEOUS AS NEEDED
Status: DISCONTINUED | OUTPATIENT
Start: 2021-11-16 | End: 2021-11-16 | Stop reason: HOSPADM

## 2021-11-16 RX ORDER — ROCURONIUM BROMIDE 10 MG/ML
INJECTION, SOLUTION INTRAVENOUS AS NEEDED
Status: DISCONTINUED | OUTPATIENT
Start: 2021-11-16 | End: 2021-11-16 | Stop reason: SURG

## 2021-11-16 RX ORDER — HYDROCODONE BITARTRATE AND ACETAMINOPHEN 5; 325 MG/1; MG/1
1 TABLET ORAL AS NEEDED
Status: DISCONTINUED | OUTPATIENT
Start: 2021-11-16 | End: 2021-11-16 | Stop reason: HOSPADM

## 2021-11-16 RX ORDER — SODIUM CHLORIDE 9 MG/ML
INJECTION, SOLUTION INTRAVENOUS CONTINUOUS
Status: DISCONTINUED | OUTPATIENT
Start: 2021-11-16 | End: 2021-11-16 | Stop reason: HOSPADM

## 2021-11-16 RX ORDER — MIDAZOLAM HYDROCHLORIDE 1 MG/ML
1 INJECTION INTRAMUSCULAR; INTRAVENOUS EVERY 5 MIN PRN
Status: DISCONTINUED | OUTPATIENT
Start: 2021-11-16 | End: 2021-11-16 | Stop reason: HOSPADM

## 2021-11-16 RX ORDER — PHENYLEPHRINE HCL 10 MG/ML
VIAL (ML) INJECTION AS NEEDED
Status: DISCONTINUED | OUTPATIENT
Start: 2021-11-16 | End: 2021-11-16 | Stop reason: SURG

## 2021-11-16 RX ORDER — HYDROCODONE BITARTRATE AND ACETAMINOPHEN 5; 325 MG/1; MG/1
2 TABLET ORAL AS NEEDED
Status: DISCONTINUED | OUTPATIENT
Start: 2021-11-16 | End: 2021-11-16 | Stop reason: HOSPADM

## 2021-11-16 RX ORDER — SODIUM CHLORIDE, SODIUM LACTATE, POTASSIUM CHLORIDE, CALCIUM CHLORIDE 600; 310; 30; 20 MG/100ML; MG/100ML; MG/100ML; MG/100ML
INJECTION, SOLUTION INTRAVENOUS CONTINUOUS
Status: DISCONTINUED | OUTPATIENT
Start: 2021-11-16 | End: 2021-11-16

## 2021-11-16 RX ORDER — DIPHENHYDRAMINE HYDROCHLORIDE 50 MG/ML
12.5 INJECTION INTRAMUSCULAR; INTRAVENOUS AS NEEDED
Status: DISCONTINUED | OUTPATIENT
Start: 2021-11-16 | End: 2021-11-16 | Stop reason: HOSPADM

## 2021-11-16 RX ORDER — ONDANSETRON 2 MG/ML
4 INJECTION INTRAMUSCULAR; INTRAVENOUS AS NEEDED
Status: DISCONTINUED | OUTPATIENT
Start: 2021-11-16 | End: 2021-11-16 | Stop reason: HOSPADM

## 2021-11-16 RX ADMIN — SODIUM CHLORIDE, SODIUM LACTATE, POTASSIUM CHLORIDE, CALCIUM CHLORIDE: 600; 310; 30; 20 INJECTION, SOLUTION INTRAVENOUS at 07:18:00

## 2021-11-16 RX ADMIN — DEXAMETHASONE SODIUM PHOSPHATE 4 MG: 4 MG/ML VIAL (ML) INJECTION at 07:36:00

## 2021-11-16 RX ADMIN — PHENYLEPHRINE HCL 50 MCG: 10 MG/ML VIAL (ML) INJECTION at 07:36:00

## 2021-11-16 RX ADMIN — SODIUM CHLORIDE, SODIUM LACTATE, POTASSIUM CHLORIDE, CALCIUM CHLORIDE: 600; 310; 30; 20 INJECTION, SOLUTION INTRAVENOUS at 08:10:00

## 2021-11-16 RX ADMIN — ONDANSETRON 4 MG: 2 INJECTION INTRAMUSCULAR; INTRAVENOUS at 07:58:00

## 2021-11-16 RX ADMIN — ROCURONIUM BROMIDE 30 MG: 10 INJECTION, SOLUTION INTRAVENOUS at 07:21:00

## 2021-11-16 NOTE — ANESTHESIA POSTPROCEDURE EVALUATION
[address] Patient Status:  Hospital Outpatient Surgery   Age/Gender 81year old male MRN [id number]   Location [address] [place] [address] Attending Zay Booth MD   Hosp Day # 0 PCP Galdino Stacy MD       Anesthesia P

## 2021-11-16 NOTE — OPERATIVE REPORT
Bronchoscopy Procedure Report     Preop diagnosis:       Lung CA, LAD  Postop diagnosis:      same  Procedure performed: Bronchoscopy, Diagnostic  Bronchoalveolar lavage, BAL, EBUS/TBNA     Sedation used:     General anesthesia     Description of procedure

## 2021-11-16 NOTE — ANESTHESIA PREPROCEDURE EVALUATION
PRE-OP EVALUATION    Patient Name: Ghassan Paul    Admit Diagnosis: ABNORMAL CT CHEST    Pre-op Diagnosis: ABNORMAL CT CHEST    ENDOBRONCHIAL ULTRASOUND (EBUS)    Anesthesia Procedure: ENDOBRONCHIAL ULTRASOUND (EBUS) (N/A )    Surgeon(s) and Role:     * Social History    Tobacco Use      Smoking status: Former Smoker      Smokeless tobacco: Never Used      Tobacco comment: 45 yrs ago    Alcohol use: Yes      Alcohol/week: 0.0 standard drinks      Comment: beer daily 1-2 cans, occ wine.       Drug use:

## 2021-11-16 NOTE — ANESTHESIA PROCEDURE NOTES
Airway  Date/Time: 11/16/2021 7:25 AM  Urgency: elective      General Information and Staff    Patient location during procedure: OR  Anesthesiologist: Gary Huerta MD  Performed: anesthesiologist     Indications and Patient Condition  Indications for airw

## 2021-11-16 NOTE — H&P
[address] [facility] - [address] Patient Status:  Hospital Outpatient Surgery    3/11/1935 MRN [id number]   Location [address] [place] [address] Attending Diana Sena MD   Hosp Day # 0 PCP Rudy Flores MD     Date of Admission:  cerebral hemorrhage; 63 yo   • Hypertension Sister    • Heart Disorder Sister          Home Medications:  B Complex Oral Tab, Take 1 tablet by mouth daily. , Disp: , Rfl:   metoprolol tartrate 25 MG Oral Tab, Take 25 mg by mouth 2 (two) times daily. Anayeli Calvert

## 2021-11-16 NOTE — ADDENDUM NOTE
Addendum  created 11/16/21 0853 by Royal Jazmine MD    Clinical Note Signed, Intraprocedure Blocks edited, Intraprocedure Meds edited

## 2021-11-30 RX ORDER — SODIUM CHLORIDE, SODIUM LACTATE, POTASSIUM CHLORIDE, CALCIUM CHLORIDE 600; 310; 30; 20 MG/100ML; MG/100ML; MG/100ML; MG/100ML
INJECTION, SOLUTION INTRAVENOUS CONTINUOUS
Status: CANCELLED | OUTPATIENT
Start: 2021-11-30

## 2021-12-04 ENCOUNTER — LAB ENCOUNTER (OUTPATIENT)
Dept: LAB | Facility: HOSPITAL | Age: 86
End: 2021-12-04
Attending: INTERNAL MEDICINE
Payer: MEDICARE

## 2021-12-04 DIAGNOSIS — R93.3 ABNORMAL FINDING ON GI TRACT IMAGING: ICD-10-CM

## 2021-12-07 ENCOUNTER — HOSPITAL ENCOUNTER (OUTPATIENT)
Facility: HOSPITAL | Age: 86
Setting detail: HOSPITAL OUTPATIENT SURGERY
Discharge: HOME | End: 2021-12-07
Attending: INTERNAL MEDICINE | Admitting: INTERNAL MEDICINE
Payer: MEDICARE

## 2021-12-07 ENCOUNTER — ANESTHESIA (OUTPATIENT)
Dept: ENDOSCOPY | Facility: HOSPITAL | Age: 86
End: 2021-12-07
Payer: MEDICARE

## 2021-12-07 ENCOUNTER — ANESTHESIA EVENT (OUTPATIENT)
Dept: ENDOSCOPY | Facility: HOSPITAL | Age: 86
End: 2021-12-07
Payer: MEDICARE

## 2021-12-07 VITALS
HEIGHT: 71 IN | BODY MASS INDEX: 18.2 KG/M2 | TEMPERATURE: 97 F | OXYGEN SATURATION: 100 % | SYSTOLIC BLOOD PRESSURE: 140 MMHG | RESPIRATION RATE: 16 BRPM | DIASTOLIC BLOOD PRESSURE: 78 MMHG | WEIGHT: 130 LBS | HEART RATE: 86 BPM

## 2021-12-07 DIAGNOSIS — R93.3 ABNORMAL FINDING ON GI TRACT IMAGING: Primary | ICD-10-CM

## 2021-12-07 PROCEDURE — 0DBP8ZX EXCISION OF RECTUM, VIA NATURAL OR ARTIFICIAL OPENING ENDOSCOPIC, DIAGNOSTIC: ICD-10-PCS | Performed by: INTERNAL MEDICINE

## 2021-12-07 PROCEDURE — 0DBH8ZX EXCISION OF CECUM, VIA NATURAL OR ARTIFICIAL OPENING ENDOSCOPIC, DIAGNOSTIC: ICD-10-PCS | Performed by: INTERNAL MEDICINE

## 2021-12-07 PROCEDURE — 0DBK8ZX EXCISION OF ASCENDING COLON, VIA NATURAL OR ARTIFICIAL OPENING ENDOSCOPIC, DIAGNOSTIC: ICD-10-PCS | Performed by: INTERNAL MEDICINE

## 2021-12-07 PROCEDURE — 0DBL8ZX EXCISION OF TRANSVERSE COLON, VIA NATURAL OR ARTIFICIAL OPENING ENDOSCOPIC, DIAGNOSTIC: ICD-10-PCS | Performed by: INTERNAL MEDICINE

## 2021-12-07 PROCEDURE — 0DBN8ZX EXCISION OF SIGMOID COLON, VIA NATURAL OR ARTIFICIAL OPENING ENDOSCOPIC, DIAGNOSTIC: ICD-10-PCS | Performed by: INTERNAL MEDICINE

## 2021-12-07 PROCEDURE — 88305 TISSUE EXAM BY PATHOLOGIST: CPT | Performed by: INTERNAL MEDICINE

## 2021-12-07 RX ORDER — HYDROMORPHONE HYDROCHLORIDE 1 MG/ML
0.4 INJECTION, SOLUTION INTRAMUSCULAR; INTRAVENOUS; SUBCUTANEOUS EVERY 5 MIN PRN
Status: DISCONTINUED | OUTPATIENT
Start: 2021-12-07 | End: 2021-12-07

## 2021-12-07 RX ORDER — SODIUM CHLORIDE, SODIUM LACTATE, POTASSIUM CHLORIDE, CALCIUM CHLORIDE 600; 310; 30; 20 MG/100ML; MG/100ML; MG/100ML; MG/100ML
INJECTION, SOLUTION INTRAVENOUS CONTINUOUS
Status: DISCONTINUED | OUTPATIENT
Start: 2021-12-07 | End: 2021-12-07

## 2021-12-07 RX ORDER — LIDOCAINE HYDROCHLORIDE 10 MG/ML
INJECTION, SOLUTION EPIDURAL; INFILTRATION; INTRACAUDAL; PERINEURAL AS NEEDED
Status: DISCONTINUED | OUTPATIENT
Start: 2021-12-07 | End: 2021-12-07 | Stop reason: SURG

## 2021-12-07 RX ORDER — NALOXONE HYDROCHLORIDE 0.4 MG/ML
80 INJECTION, SOLUTION INTRAMUSCULAR; INTRAVENOUS; SUBCUTANEOUS AS NEEDED
Status: DISCONTINUED | OUTPATIENT
Start: 2021-12-07 | End: 2021-12-07

## 2021-12-07 RX ADMIN — LIDOCAINE HYDROCHLORIDE 50 MG: 10 INJECTION, SOLUTION EPIDURAL; INFILTRATION; INTRACAUDAL; PERINEURAL at 16:11:00

## 2021-12-07 NOTE — OPERATIVE REPORT
[id number]  Rosalita Dancer  3/11/1935  12/7/2021      Preoperative Diagnosis: Abnormal PET scan with uptake in the right side of the colon. Patient with lung mass.   Prior biopsy was nondiagnostic  Postoperative Diagnosis: Hemorrhoids, diverticulosis pattern was unremarkable. In the cecum there were 3 subcentimeter polyps removed with combination of cold snare polypectomy (2 polyps) , and one with cautery snare, single endoscopic clip was placed on one of the sites.       Large mid ascending colon po tomorrow and monitor for signs of bleeding. None of the polyps removed has malignant appearance thus unlikely that the lung mass is related to colon malignancy.     Tiff Cadena MD

## 2021-12-07 NOTE — H&P
[facility] Department of  Gastroenterology  Update Health History :       Yesika Campbell  male   Gómez Mckeon MD     [id number]  3/11/1935 Primary Care Physician  James Campbell MD        HPI :  Abnormal PET scan with uptake in the right s Disp: , Rfl:   Folic Acid 5 MG Oral Cap, Take by mouth daily. , Disp: , Rfl:   B Complex Oral Tab, Take 1 tablet by mouth daily. , Disp: , Rfl:   apixaban 5 MG Oral Tab, Take 5 mg by mouth 2 (two) times daily. , Disp: , Rfl:   metoprolol tartrate 25 MG Oral T

## 2021-12-07 NOTE — ANESTHESIA POSTPROCEDURE EVALUATION
[address] Patient Status:  Hospital Outpatient Surgery   Age/Gender 81year old male MRN [id number]   Location [address] [place] [address] Attending Adrianna Vazquez MD   Hosp Day # 0 PCP Nell Kebede MD       Anesth

## 2021-12-07 NOTE — ANESTHESIA PREPROCEDURE EVALUATION
PRE-OP EVALUATION    Patient Name: [facility]    Admit Diagnosis: Abnormal finding on GI tract imaging [R93.3]    Pre-op Diagnosis: Abnormal finding on GI tract imaging [R93.3]    COLONOSCOPY    Anesthesia Procedure: COLONOSCOPY (N/A )    Surgeon MCHC 31.6 11/22/2021    RDW 21.6 (H) 11/22/2021     11/22/2021     Lab Results   Component Value Date     11/22/2021    K 4.10 11/22/2021     11/22/2021    CO2 26.6 11/22/2021    BUN 20.0 11/22/2021    CREATSERUM 0.80 11/22/2021    GLU 1

## 2021-12-09 ENCOUNTER — ANESTHESIA EVENT (OUTPATIENT)
Dept: ENDOSCOPY | Facility: HOSPITAL | Age: 86
End: 2021-12-09
Payer: MEDICARE

## 2021-12-10 NOTE — PROGRESS NOTES
Will confirm with RA management plans. No malignancy on pathology. 12/7/2021        Preoperative Diagnosis:             Abnormal PET scan with uptake in the right side of the colon. Patient with lung mass.   Prior biopsy was nondiagnostic  Postoperative

## 2021-12-10 NOTE — PROGRESS NOTES
Need to await lung pathology to determine next steps. Will ask patient to schedule follow up in 4 months.

## 2021-12-11 NOTE — PROGRESS NOTES
12/10/2021  [address] [facility] [address]    Dear Marycarmen Yadav,     The  biopsy/pathology findings from your colonoscopy showed:  Colon polyps: adenomatous polyps, which are benign potentially pre-cancerous growths that were removed.

## 2021-12-12 ENCOUNTER — LAB ENCOUNTER (OUTPATIENT)
Dept: LAB | Facility: HOSPITAL | Age: 86
End: 2021-12-12
Attending: INTERNAL MEDICINE
Payer: MEDICARE

## 2021-12-12 DIAGNOSIS — R93.89 ABNORMAL CT OF THE CHEST: ICD-10-CM

## 2021-12-12 DIAGNOSIS — C90.00 MULTIPLE MYELOMA NOT HAVING ACHIEVED REMISSION (HCC): ICD-10-CM

## 2021-12-12 PROCEDURE — 85025 COMPLETE CBC W/AUTO DIFF WBC: CPT

## 2021-12-12 PROCEDURE — 36415 COLL VENOUS BLD VENIPUNCTURE: CPT

## 2021-12-15 ENCOUNTER — APPOINTMENT (OUTPATIENT)
Dept: GENERAL RADIOLOGY | Facility: HOSPITAL | Age: 86
End: 2021-12-15
Attending: INTERNAL MEDICINE
Payer: MEDICARE

## 2021-12-15 ENCOUNTER — ANESTHESIA (OUTPATIENT)
Dept: ENDOSCOPY | Facility: HOSPITAL | Age: 86
End: 2021-12-15
Payer: MEDICARE

## 2021-12-15 ENCOUNTER — HOSPITAL ENCOUNTER (OUTPATIENT)
Facility: HOSPITAL | Age: 86
Setting detail: HOSPITAL OUTPATIENT SURGERY
Discharge: HOME | End: 2021-12-15
Attending: INTERNAL MEDICINE | Admitting: INTERNAL MEDICINE
Payer: MEDICARE

## 2021-12-15 VITALS
WEIGHT: 134 LBS | BODY MASS INDEX: 18.76 KG/M2 | HEART RATE: 78 BPM | RESPIRATION RATE: 18 BRPM | TEMPERATURE: 98 F | OXYGEN SATURATION: 93 % | DIASTOLIC BLOOD PRESSURE: 52 MMHG | SYSTOLIC BLOOD PRESSURE: 98 MMHG | HEIGHT: 71 IN

## 2021-12-15 DIAGNOSIS — C34.90 LUNG CANCER (HCC): ICD-10-CM

## 2021-12-15 DIAGNOSIS — R93.89 ABNORMAL CT OF THE CHEST: Primary | ICD-10-CM

## 2021-12-15 PROCEDURE — 0B9C8ZX DRAINAGE OF RIGHT UPPER LUNG LOBE, VIA NATURAL OR ARTIFICIAL OPENING ENDOSCOPIC, DIAGNOSTIC: ICD-10-PCS | Performed by: INTERNAL MEDICINE

## 2021-12-15 PROCEDURE — 88305 TISSUE EXAM BY PATHOLOGIST: CPT | Performed by: INTERNAL MEDICINE

## 2021-12-15 PROCEDURE — 71045 X-RAY EXAM CHEST 1 VIEW: CPT | Performed by: INTERNAL MEDICINE

## 2021-12-15 PROCEDURE — 88104 CYTOPATH FL NONGYN SMEARS: CPT | Performed by: INTERNAL MEDICINE

## 2021-12-15 PROCEDURE — 0BDC8ZX EXTRACTION OF RIGHT UPPER LUNG LOBE, VIA NATURAL OR ARTIFICIAL OPENING ENDOSCOPIC, DIAGNOSTIC: ICD-10-PCS | Performed by: INTERNAL MEDICINE

## 2021-12-15 PROCEDURE — 88173 CYTOPATH EVAL FNA REPORT: CPT | Performed by: INTERNAL MEDICINE

## 2021-12-15 PROCEDURE — 88341 IMHCHEM/IMCYTCHM EA ADD ANTB: CPT | Performed by: INTERNAL MEDICINE

## 2021-12-15 PROCEDURE — 88360 TUMOR IMMUNOHISTOCHEM/MANUAL: CPT | Performed by: INTERNAL MEDICINE

## 2021-12-15 PROCEDURE — 88342 IMHCHEM/IMCYTCHM 1ST ANTB: CPT | Performed by: INTERNAL MEDICINE

## 2021-12-15 PROCEDURE — 0BBC8ZX EXCISION OF RIGHT UPPER LUNG LOBE, VIA NATURAL OR ARTIFICIAL OPENING ENDOSCOPIC, DIAGNOSTIC: ICD-10-PCS | Performed by: INTERNAL MEDICINE

## 2021-12-15 PROCEDURE — 88172 CYTP DX EVAL FNA 1ST EA SITE: CPT | Performed by: INTERNAL MEDICINE

## 2021-12-15 PROCEDURE — 07D78ZX EXTRACTION OF THORAX LYMPHATIC, VIA NATURAL OR ARTIFICIAL OPENING ENDOSCOPIC, DIAGNOSTIC: ICD-10-PCS | Performed by: INTERNAL MEDICINE

## 2021-12-15 PROCEDURE — 88177 CYTP FNA EVAL EA ADDL: CPT | Performed by: INTERNAL MEDICINE

## 2021-12-15 RX ORDER — SODIUM CHLORIDE, SODIUM LACTATE, POTASSIUM CHLORIDE, CALCIUM CHLORIDE 600; 310; 30; 20 MG/100ML; MG/100ML; MG/100ML; MG/100ML
INJECTION, SOLUTION INTRAVENOUS CONTINUOUS
Status: DISCONTINUED | OUTPATIENT
Start: 2021-12-15 | End: 2021-12-15

## 2021-12-15 RX ORDER — NALOXONE HYDROCHLORIDE 0.4 MG/ML
80 INJECTION, SOLUTION INTRAMUSCULAR; INTRAVENOUS; SUBCUTANEOUS AS NEEDED
Status: DISCONTINUED | OUTPATIENT
Start: 2021-12-15 | End: 2021-12-15 | Stop reason: HOSPADM

## 2021-12-15 RX ORDER — ONDANSETRON 2 MG/ML
INJECTION INTRAMUSCULAR; INTRAVENOUS AS NEEDED
Status: DISCONTINUED | OUTPATIENT
Start: 2021-12-15 | End: 2021-12-15 | Stop reason: SURG

## 2021-12-15 RX ORDER — HYDROMORPHONE HYDROCHLORIDE 1 MG/ML
0.4 INJECTION, SOLUTION INTRAMUSCULAR; INTRAVENOUS; SUBCUTANEOUS EVERY 5 MIN PRN
Status: DISCONTINUED | OUTPATIENT
Start: 2021-12-15 | End: 2021-12-15 | Stop reason: HOSPADM

## 2021-12-15 RX ORDER — HYDROCODONE BITARTRATE AND ACETAMINOPHEN 5; 325 MG/1; MG/1
2 TABLET ORAL AS NEEDED
Status: DISCONTINUED | OUTPATIENT
Start: 2021-12-15 | End: 2021-12-15 | Stop reason: HOSPADM

## 2021-12-15 RX ORDER — SODIUM CHLORIDE, SODIUM LACTATE, POTASSIUM CHLORIDE, CALCIUM CHLORIDE 600; 310; 30; 20 MG/100ML; MG/100ML; MG/100ML; MG/100ML
INJECTION, SOLUTION INTRAVENOUS CONTINUOUS
Status: DISCONTINUED | OUTPATIENT
Start: 2021-12-15 | End: 2021-12-15 | Stop reason: HOSPADM

## 2021-12-15 RX ORDER — DEXAMETHASONE SODIUM PHOSPHATE 4 MG/ML
VIAL (ML) INJECTION AS NEEDED
Status: DISCONTINUED | OUTPATIENT
Start: 2021-12-15 | End: 2021-12-15 | Stop reason: SURG

## 2021-12-15 RX ORDER — ONDANSETRON 2 MG/ML
4 INJECTION INTRAMUSCULAR; INTRAVENOUS AS NEEDED
Status: DISCONTINUED | OUTPATIENT
Start: 2021-12-15 | End: 2021-12-15 | Stop reason: HOSPADM

## 2021-12-15 RX ORDER — HYDROCODONE BITARTRATE AND ACETAMINOPHEN 5; 325 MG/1; MG/1
1 TABLET ORAL AS NEEDED
Status: DISCONTINUED | OUTPATIENT
Start: 2021-12-15 | End: 2021-12-15 | Stop reason: HOSPADM

## 2021-12-15 RX ORDER — LIDOCAINE HYDROCHLORIDE 10 MG/ML
INJECTION, SOLUTION EPIDURAL; INFILTRATION; INTRACAUDAL; PERINEURAL AS NEEDED
Status: DISCONTINUED | OUTPATIENT
Start: 2021-12-15 | End: 2021-12-15 | Stop reason: SURG

## 2021-12-15 RX ADMIN — LIDOCAINE HYDROCHLORIDE 100 MG: 10 INJECTION, SOLUTION EPIDURAL; INFILTRATION; INTRACAUDAL; PERINEURAL at 13:24:00

## 2021-12-15 RX ADMIN — LIDOCAINE HYDROCHLORIDE 50 MG: 10 INJECTION, SOLUTION EPIDURAL; INFILTRATION; INTRACAUDAL; PERINEURAL at 13:21:00

## 2021-12-15 RX ADMIN — ONDANSETRON 4 MG: 2 INJECTION INTRAMUSCULAR; INTRAVENOUS at 14:02:00

## 2021-12-15 RX ADMIN — DEXAMETHASONE SODIUM PHOSPHATE 4 MG: 4 MG/ML VIAL (ML) INJECTION at 13:31:00

## 2021-12-15 NOTE — ANESTHESIA PROCEDURE NOTES
Airway  Date/Time: 12/15/2021 1:20 PM  Urgency: elective      General Information and Staff    Patient location during procedure: OR  Anesthesiologist: Patricia Hein MD  Performed: anesthesiologist     Indications and Patient Condition  Indications for a

## 2021-12-15 NOTE — ANESTHESIA POSTPROCEDURE EVALUATION
[facility] [address] Patient Status:  Hospital Outpatient Surgery   Age/Gender 81year old male MRN [id number]   Location [address] [facility] Attending Kelsie Ley MD   Hosp Day # 0 PCP Lenore Kan MD       A

## 2021-12-15 NOTE — OPERATIVE REPORT
Name: Amie Kraus   MRN:   [id number]  : 3/11/1935       EBUS Bronchoscopy Procedure Report - [facility]     Preop diagnosis:        Abnormal CT scan, right paratracheal mass    Postop diagnosis:       Abnormal CT scan, right paratra evaluation by the pathologist revealed: cartilage, bronchial epithelial cells, and one area of atypical cells. The EBUS bronchoscope was removed after the biopsies were completed. The standard bronchoscope was reinserted.  The scope was advanced to the

## 2021-12-15 NOTE — ANESTHESIA PREPROCEDURE EVALUATION
PRE-OP EVALUATION    Patient Name: Kathleen Damian    Admit Diagnosis: abnormal ct of the chest    Pre-op Diagnosis: abnormal ct of the chest    ENDOBRONCHIAL ULTRASOUND (EBUS)    Anesthesia Procedure: ENDOBRONCHIAL ULTRASOUND (EBUS) (N/A )    Surgeon Procedure: COLONOSCOPY W/ ORISE INJECTION, HOT SNARE POLYPECTOMY, CLIP PLACEMENT X8;  Surgeon: Adrianna Vazquez MD;  Location: [facility] ENDOSCOPY   • HERNIA SURGERY     • REMOVAL OF LUNG,LOBECTOMY  2016     Social History    Tobacco Use      Smoking status: F

## 2021-12-15 NOTE — H&P
PRE-PROCEDURE H&P    HPI: Carola Stewart is a 81year old male with a history of atrial fibrillation, lung cancer, prostate cancer, red cell aplasia, myeloma, who is here for bronchoscopy for biopsy.  He has been following with oncology in [facility] (CALCIUM 1000 + D OR), Take 2,000 mg by mouth., Disp: , Rfl:   Folic Acid 5 MG Oral Cap, Take by mouth daily. , Disp: , Rfl:   B Complex Oral Tab, Take 1 tablet by mouth daily. , Disp: , Rfl:   apixaban 5 MG Oral Tab, Take 5 mg by mouth 2 (two) times daily. ,

## 2021-12-18 ENCOUNTER — HOSPITAL ENCOUNTER (INPATIENT)
Facility: HOSPITAL | Age: 86
LOS: 6 days | Discharge: HOME | DRG: 178 | End: 2021-12-25
Attending: EMERGENCY MEDICINE | Admitting: INTERNAL MEDICINE
Payer: MEDICARE

## 2021-12-18 DIAGNOSIS — D64.9 ANEMIA, UNSPECIFIED TYPE: Primary | ICD-10-CM

## 2021-12-18 LAB
ALBUMIN SERPL-MCNC: 2.4 G/DL (ref 3.4–5)
ALBUMIN/GLOB SERPL: 0.6 {RATIO} (ref 1–2)
ALP LIVER SERPL-CCNC: 71 U/L
ALT SERPL-CCNC: 15 U/L
ANION GAP SERPL CALC-SCNC: 5 MMOL/L (ref 0–18)
AST SERPL-CCNC: 12 U/L (ref 15–37)
BASOPHILS # BLD AUTO: 0.02 X10(3) UL (ref 0–0.2)
BASOPHILS NFR BLD AUTO: 0.1 %
BILIRUB SERPL-MCNC: 1.4 MG/DL (ref 0.1–2)
BUN BLD-MCNC: 22 MG/DL (ref 7–18)
CALCIUM BLD-MCNC: 8.4 MG/DL (ref 8.5–10.1)
CHLORIDE SERPL-SCNC: 103 MMOL/L (ref 98–112)
CO2 SERPL-SCNC: 26 MMOL/L (ref 21–32)
CREAT BLD-MCNC: 0.86 MG/DL
EOSINOPHIL # BLD AUTO: 0.02 X10(3) UL (ref 0–0.7)
EOSINOPHIL NFR BLD AUTO: 0.1 %
ERYTHROCYTE [DISTWIDTH] IN BLOOD BY AUTOMATED COUNT: 21.8 %
GLOBULIN PLAS-MCNC: 4.1 G/DL (ref 2.8–4.4)
GLUCOSE BLD-MCNC: 127 MG/DL (ref 70–99)
HCT VFR BLD AUTO: 21.3 %
HGB BLD-MCNC: 7 G/DL
IMM GRANULOCYTES # BLD AUTO: 0.06 X10(3) UL (ref 0–1)
IMM GRANULOCYTES NFR BLD: 0.4 %
LYMPHOCYTES # BLD AUTO: 0.63 X10(3) UL (ref 1–4)
LYMPHOCYTES NFR BLD AUTO: 4.3 %
MCH RBC QN AUTO: 33.2 PG (ref 26–34)
MCHC RBC AUTO-ENTMCNC: 32.9 G/DL (ref 31–37)
MCV RBC AUTO: 100.9 FL
MONOCYTES # BLD AUTO: 0.97 X10(3) UL (ref 0.1–1)
MONOCYTES NFR BLD AUTO: 6.7 %
NEUTROPHILS # BLD AUTO: 12.85 X10 (3) UL (ref 1.5–7.7)
NEUTROPHILS # BLD AUTO: 12.85 X10(3) UL (ref 1.5–7.7)
NEUTROPHILS NFR BLD AUTO: 88.4 %
OSMOLALITY SERPL CALC.SUM OF ELEC: 283 MOSM/KG (ref 275–295)
PLATELET # BLD AUTO: 305 10(3)UL (ref 150–450)
POTASSIUM SERPL-SCNC: 4 MMOL/L (ref 3.5–5.1)
PROT SERPL-MCNC: 6.5 G/DL (ref 6.4–8.2)
RBC # BLD AUTO: 2.11 X10(6)UL
SARS-COV-2 RNA RESP QL NAA+PROBE: NOT DETECTED
SODIUM SERPL-SCNC: 134 MMOL/L (ref 136–145)
TROPONIN I HIGH SENSITIVITY: 9 NG/L
WBC # BLD AUTO: 14.6 X10(3) UL (ref 4–11)

## 2021-12-19 ENCOUNTER — APPOINTMENT (OUTPATIENT)
Dept: GENERAL RADIOLOGY | Facility: HOSPITAL | Age: 86
DRG: 178 | End: 2021-12-19
Attending: EMERGENCY MEDICINE
Payer: MEDICARE

## 2021-12-19 PROBLEM — D64.9 ANEMIA: Status: ACTIVE | Noted: 2021-01-01

## 2021-12-19 PROBLEM — D64.9 ANEMIA: Status: ACTIVE | Noted: 2021-12-19

## 2021-12-19 PROBLEM — D64.9 ANEMIA, UNSPECIFIED TYPE: Status: ACTIVE | Noted: 2021-12-19

## 2021-12-19 PROBLEM — D64.9 ANEMIA, UNSPECIFIED TYPE: Status: ACTIVE | Noted: 2021-01-01

## 2021-12-19 LAB
ANION GAP SERPL CALC-SCNC: 7 MMOL/L (ref 0–18)
ANTIBODY SCREEN: NEGATIVE
ATRIAL RATE: 79 BPM
BILIRUB UR QL STRIP.AUTO: NEGATIVE
BUN BLD-MCNC: 25 MG/DL (ref 7–18)
CALCIUM BLD-MCNC: 8.3 MG/DL (ref 8.5–10.1)
CHLORIDE SERPL-SCNC: 104 MMOL/L (ref 98–112)
CO2 SERPL-SCNC: 24 MMOL/L (ref 21–32)
COLOR UR AUTO: YELLOW
CREAT BLD-MCNC: 0.92 MG/DL
ERYTHROCYTE [DISTWIDTH] IN BLOOD BY AUTOMATED COUNT: 21.8 %
GLUCOSE BLD-MCNC: 100 MG/DL (ref 70–99)
GLUCOSE UR STRIP.AUTO-MCNC: NEGATIVE MG/DL
HCT VFR BLD AUTO: 20.6 %
HGB BLD-MCNC: 6.4 G/DL
HGB BLD-MCNC: 8.2 G/DL
KETONES UR STRIP.AUTO-MCNC: NEGATIVE MG/DL
LACTATE SERPL-SCNC: 1.1 MMOL/L (ref 0.4–2)
LEUKOCYTE ESTERASE UR QL STRIP.AUTO: NEGATIVE
MCH RBC QN AUTO: 32.5 PG (ref 26–34)
MCHC RBC AUTO-ENTMCNC: 31.1 G/DL (ref 31–37)
MCV RBC AUTO: 104.6 FL
NITRITE UR QL STRIP.AUTO: NEGATIVE
OSMOLALITY SERPL CALC.SUM OF ELEC: 284 MOSM/KG (ref 275–295)
PH UR STRIP.AUTO: 5 [PH] (ref 5–8)
PLATELET # BLD AUTO: 267 10(3)UL (ref 150–450)
POTASSIUM SERPL-SCNC: 3.9 MMOL/L (ref 3.5–5.1)
PROCALCITONIN SERPL-MCNC: 0.14 NG/ML (ref ?–0.16)
PROT UR STRIP.AUTO-MCNC: NEGATIVE MG/DL
Q-T INTERVAL: 342 MS
QRS DURATION: 80 MS
QTC CALCULATION (BEZET): 429 MS
R AXIS: -52 DEGREES
RBC # BLD AUTO: 1.97 X10(6)UL
RBC UR QL AUTO: NEGATIVE
RH BLOOD TYPE: NEGATIVE
SODIUM SERPL-SCNC: 135 MMOL/L (ref 136–145)
SP GR UR STRIP.AUTO: 1.02 (ref 1–1.03)
T AXIS: 78 DEGREES
UROBILINOGEN UR STRIP.AUTO-MCNC: <2 MG/DL
VENTRICULAR RATE: 95 BPM
WBC # BLD AUTO: 14.7 X10(3) UL (ref 4–11)

## 2021-12-19 PROCEDURE — 30233N1 TRANSFUSION OF NONAUTOLOGOUS RED BLOOD CELLS INTO PERIPHERAL VEIN, PERCUTANEOUS APPROACH: ICD-10-PCS | Performed by: HOSPITALIST

## 2021-12-19 PROCEDURE — 71045 X-RAY EXAM CHEST 1 VIEW: CPT | Performed by: EMERGENCY MEDICINE

## 2021-12-19 RX ORDER — ACETAMINOPHEN 500 MG
1000 TABLET ORAL ONCE
Status: COMPLETED | OUTPATIENT
Start: 2021-12-19 | End: 2021-12-19

## 2021-12-19 RX ORDER — ACETAMINOPHEN 325 MG/1
650 TABLET ORAL EVERY 4 HOURS PRN
Status: DISCONTINUED | OUTPATIENT
Start: 2021-12-19 | End: 2021-12-25

## 2021-12-19 RX ORDER — HYDROCODONE BITARTRATE AND ACETAMINOPHEN 5; 325 MG/1; MG/1
2 TABLET ORAL EVERY 4 HOURS PRN
Status: DISCONTINUED | OUTPATIENT
Start: 2021-12-19 | End: 2021-12-25

## 2021-12-19 RX ORDER — SODIUM PHOSPHATE, DIBASIC AND SODIUM PHOSPHATE, MONOBASIC 7; 19 G/133ML; G/133ML
1 ENEMA RECTAL ONCE AS NEEDED
Status: DISCONTINUED | OUTPATIENT
Start: 2021-12-19 | End: 2021-12-25

## 2021-12-19 RX ORDER — FOLIC ACID 1 MG/1
5 TABLET ORAL DAILY
Status: DISCONTINUED | OUTPATIENT
Start: 2021-12-19 | End: 2021-12-25

## 2021-12-19 RX ORDER — METOCLOPRAMIDE HYDROCHLORIDE 5 MG/ML
10 INJECTION INTRAMUSCULAR; INTRAVENOUS EVERY 8 HOURS PRN
Status: DISCONTINUED | OUTPATIENT
Start: 2021-12-19 | End: 2021-12-25

## 2021-12-19 RX ORDER — POLYETHYLENE GLYCOL 3350 17 G/17G
17 POWDER, FOR SOLUTION ORAL DAILY PRN
Status: DISCONTINUED | OUTPATIENT
Start: 2021-12-19 | End: 2021-12-25

## 2021-12-19 RX ORDER — SENNOSIDES 8.6 MG
17.2 TABLET ORAL NIGHTLY PRN
Status: DISCONTINUED | OUTPATIENT
Start: 2021-12-19 | End: 2021-12-25

## 2021-12-19 RX ORDER — ACETAMINOPHEN 500 MG
TABLET ORAL
Status: COMPLETED
Start: 2021-12-19 | End: 2021-12-19

## 2021-12-19 RX ORDER — HYDROCODONE BITARTRATE AND ACETAMINOPHEN 5; 325 MG/1; MG/1
1 TABLET ORAL EVERY 4 HOURS PRN
Status: DISCONTINUED | OUTPATIENT
Start: 2021-12-19 | End: 2021-12-25

## 2021-12-19 RX ORDER — VANCOMYCIN HYDROCHLORIDE
25 ONCE
Status: COMPLETED | OUTPATIENT
Start: 2021-12-20 | End: 2021-12-20

## 2021-12-19 RX ORDER — ONDANSETRON 2 MG/ML
4 INJECTION INTRAMUSCULAR; INTRAVENOUS EVERY 6 HOURS PRN
Status: DISCONTINUED | OUTPATIENT
Start: 2021-12-19 | End: 2021-12-25

## 2021-12-19 RX ORDER — BISACODYL 10 MG
10 SUPPOSITORY, RECTAL RECTAL
Status: DISCONTINUED | OUTPATIENT
Start: 2021-12-19 | End: 2021-12-25

## 2021-12-19 NOTE — PROGRESS NOTES
NURSING ADMISSION NOTE      Patient admitted via Cart  Oriented to room 3810  Safety precautions initiated. Bed in low position. Call light in reach. Admission navigator completed. Alert x3-4 confused/forgetful at times.  Son at bedside to [facility]

## 2021-12-19 NOTE — PLAN OF CARE
Received alert x3, forgetful  Lungs diminished bilaterally  PRBC's given per order  Tolerating general diet  Atrial fib on monitor, Eliquis given  Up with walker and 1 assist,unsteady on feet  Wife at bedside through out shift,very particular ordering RN a

## 2021-12-19 NOTE — PHYSICAL THERAPY NOTE
PHYSICAL THERAPY EVALUATION - INPATIENT     Room Number: 6027/6433-V  Evaluation Date: 12/19/2021  Type of Evaluation: Initial  Physician Order: PT Eval and Treat    Presenting Problem: fatigue, weakness  Co-Morbidities : a-fib, red cell aplasia, pro rolling at assistance level: modified independent     Goal #4    Goal #5    Goal #6    Goal Comments: Goals established on 12/19/2021    HOME SITUATION  Type of Home: [address]: One level                Lives With: Spouse Score:  Raw Score: 18   Approx Degree of Impairment: 46.58%   Standardized Score (AM-PAC Scale): 43.63   CMS Modifier (G-Code): CK    FUNCTIONAL ABILITY STATUS  Gait Assessment   Functional Mobility/Gait Assessment  Gait Assistance: Contact guard assist  D

## 2021-12-19 NOTE — CONSULTS
Pulmonary H&P/Consult       NAME: Papito Krueger - ROOM: [facility]21-X - MRN: [id number] - Age: 81year old - :  3/11/1935    Date of Admission: 2021 10:03 PM  Admission Diagnosis: Anemia, unspecified type [D64.9]  Reason for consult: ENRICO Brambila Unknown time  apixaban 5 MG Oral Tab, Take 5 mg by mouth 2 (two) times daily. , Disp: , Rfl: , 12/18/2021 at Unknown time  metoprolol tartrate 25 MG Oral Tab, Take 25 mg by mouth 2 (two) times daily. , Disp: , Rfl: , 12/18/2021 at Unknown time  Multiple Marina by mouth daily. , Disp: , Rfl:   apixaban 5 MG Oral Tab, Take 5 mg by mouth 2 (two) times daily. , Disp: , Rfl:   metoprolol tartrate 25 MG Oral Tab, Take 25 mg by mouth 2 (two) times daily. , Disp: , Rfl:   Multiple Vitamins-Minerals (MULTI FOR HIM 50+) Oral normal   Neck:   Supple, symmetrical, trachea midline, no adenopathy;        thyroid:  No enlargement/tenderness/nodules; no carotid    bruit or JVD   Back:     Symmetric, no curvature, ROM normal, no CVA tenderness   Lungs:     Rales in Left base, Right b

## 2021-12-19 NOTE — ED INITIAL ASSESSMENT (HPI)
Pt arrival via EMS with c/o weakness that began today. Per pt, he was just diagnosed with lung cx today.

## 2021-12-19 NOTE — ED QUICK NOTES
Orders for admission, patient is aware of plan and ready to go upstairs. Any questions, please call ED CAROLINE Garcia  at extension [phone]. Vaccinated? Yes  Type of COVID test sent: rapid  COVID Suspicion level: Low    Titratable drug(s) infusing:  NaCl  Rate

## 2021-12-19 NOTE — ED PROVIDER NOTES
Patient Seen in: [facility] Emergency Department      History   Patient presents with:  Fatigue    Stated Complaint: Weakness    Subjective:   HPI    The patient is an 43-year-old male with a history of red cell aplasia and has required multiple blo yrs ago    Vaping Use      Vaping Use: Never used    Alcohol use: Not Currently      Alcohol/week: 0.0 standard drinks    Drug use: No             Review of Systems    Positive for stated complaint: Weakness  Other systems are as noted in HPI.   Constitutio masses or nodules or abnormalities.   Psych: Normal interaction, cooperative with exam         ED Course     Labs Reviewed   COMP METABOLIC PANEL (14) - Abnormal; Notable for the following components:       Result Value    Glucose 127 (*)     Sodium 134 (*) PREPARE RBC   ABORH (BLOOD TYPE)   ANTIBODY SCREEN   RAINBOW DRAW LAVENDER   RAINBOW DRAW LIGHT GREEN     EKG    Rate, intervals and axes as noted on EKG Report.   Rate: 95  Rhythm: Atrial Fibrillation  Reading: Atrial fibrillation with normal ventricular Noted POA    * (Principal) Anemia D64.9 12/19/2021 Unknown

## 2021-12-19 NOTE — ED QUICK NOTES
Dr Quiroz notified of pt's temp of 100.4F. 1000 mg of tylenol ordered per MD and edwin to proceed with blood transfusion. 15 minutes into the blood transfusion pt's temp was 101.5F. Dr. Christy Quiroga notified. Order obtained to stop blood transfusion.  Per MD, transfus

## 2021-12-19 NOTE — H&P
[facility] Hospitalist Team  History and Physical       Assessment/Plan:   81year old male with PMH including but not limited to prostate ca, MM, afib on xarelto who p/t 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       Social History    Tobacco Use      Smoking sta gallop appreciated   Abdomen:   Soft, non-tender. Bowel sounds normal. No masses,  No organomegaly. Non distended   Extremities: Extremities normal, atraumatic, no cyanosis or edema. Skin: Skin color, texture, turgor normal. No rashes or lesions.     Robert Go diagnostic CT scans with specific imaging protocols for different body parts and indications. The standardized uptake values (SUV) are normalized to patient body weight and indicate the highest activity concentration (SUV max) in a given disease site.  Auto colon, potentially physiologic. There is more diffuse FDG uptake in distal descending colon extending into sigmoid which appears physiologic. Radioactive seed implants are present in the prostate gland.  SKELETON: No focal hypermetabolic abnormalities are i greater than left lung base may represent pneumonia versus asymmetric pulmonary edema.     Dictated by (CST): Arpita Addison MD on 12/19/2021 at 6:58 AM     Finalized by (CST): Arpita Addison MD on 12/19/2021 at 7:01 AM       XR CHEST AP PORTABLE  (CPT=71045) state of illness, I anticipate that, after discharge, patient will require TBD.

## 2021-12-20 LAB
BLOOD TYPE BARCODE: 9500
BLOOD TYPE BARCODE: 9500
ERYTHROCYTE [DISTWIDTH] IN BLOOD BY AUTOMATED COUNT: 21.1 %
HCT VFR BLD AUTO: 24.6 %
HGB BLD-MCNC: 8.1 G/DL
MCH RBC QN AUTO: 32.9 PG (ref 26–34)
MCHC RBC AUTO-ENTMCNC: 32.9 G/DL (ref 31–37)
MCV RBC AUTO: 100 FL
PLATELET # BLD AUTO: 281 10(3)UL (ref 150–450)
RBC # BLD AUTO: 2.46 X10(6)UL
VANCOMYCIN PEAK SERPL-MCNC: 20 UG/ML (ref 30–50)
WBC # BLD AUTO: 17.1 X10(3) UL (ref 4–11)

## 2021-12-20 NOTE — HOME CARE LIAISON
Patient is current with [facility]. Resume orders for RN PT will be needed upon discharge.

## 2021-12-20 NOTE — OCCUPATIONAL THERAPY NOTE
OCCUPATIONAL THERAPY EVALUATION - INPATIENT     Room Number: 6758/4469-U  Evaluation Date: 12/20/2021  Type of Evaluation: Initial  Presenting Problem: Pneumonia, anemia    Physician Order: IP Consult to Occupational Therapy  Reason for Therapy: ADL/IADL D set-up  LB dressing: min A  Toileting: min A    Functional Mobility:  Supine to Sit : Supervision  Sit to Stand: Contact guard assist  Sit to supine: supervision  Chair transfer: min A  Toilet/commode transfer: CGA  Ambulation CGA and use of walker    Acti 0  Location: denies       OBJECTIVE  Precautions: Bed/chair alarm  Fall Risk: High fall risk    COGNITION  Attention Span:  appears intact  Orientation Level:  oriented x4  Memory:  appears intact  Following Commands:  follows one step commands with increa supervision  Patient will transfer to toilet:  with supervision    ADDITIONAL GOALS:  Patient will independently adhere to precautions during self-care and functional mobility tasks. Writer PPE: Surgical mask, goggles, and gloves worn.      Patient/famil

## 2021-12-20 NOTE — DIETARY NOTE
[facility]  NUTRITION ASSESSMENT    Pt does not meet malnutrition criteria. NUTRITION INTERVENTION:    1. RD nutrition Care Plan- See RD nutrition assessment for additional recommendations  2.  Meal and Snacks - Continue Cardiac Diet as tolerated; m is 16.94 kg/m².   IBW: 80.9 kg    WEIGHT HISTORY:   Patient Weight(s) for the past 336 hrs:   Weight   12/19/21 0310 56.7 kg (124 lb 14.4 oz)   12/18/21 2214 61.2 kg (135 lb)       Wt Readings from Last 10 Encounters:  12/19/21 : 56.7 kg (124 lb 14.4 oz)  1

## 2021-12-20 NOTE — PROGRESS NOTES
[facility] Hospitalist note    PCP: Maritza Edmondson MD    Chief Complaint:  F/u anemia, pna    SUBJECTIVE:  1/2 Blood cultures returned positive, with strep  Pt reports feeling about the same. No sob. Eating some.     OBJECTIVE:  Temp:  [97.5 °F (36.4 °C)-101.3 ° Intravenous Q8H       acetaminophen **OR** HYDROcodone-acetaminophen **OR** HYDROcodone-acetaminophen, polyethylene glycol (PEG 3350), sennosides, bisacodyl, Fleet Enema, ondansetron, metoclopramide, Heparin Lock Flush       Assessment/Plan:     81 year ol

## 2021-12-20 NOTE — CM/SW NOTE
HOME SITUATION  Type of Home: Independent living facility   Home Layout: One level   Lives With: Spouse     Prior Level of [place]: Patient is normally ambulatory without device but wife is reporting that patient was weak that he could not even get up

## 2021-12-20 NOTE — PROGRESS NOTES
[facility] Dosing Service    Initial Pharmacokinetic Consult for Vancomycin AUC Dosing    Thomas Rosenthal is a 81year old patient who is being treated for bacteremia. Pharmacy has been asked to dose vancomycin by Dr. Roseline Torrez. .    Weights:  [place] cookie

## 2021-12-20 NOTE — PROGRESS NOTES
[address] Patient Status:  Inpatient    3/11/1935 MRN [id number]   [facility] 3NE-A Attending Oralia Quintero MD   Hosp Day # 1 PCP Raven Monroe MD     SUBJECTIVE: Pt has some dyspnea on exertion, no significant in NAD                          HEENT: oropharynx clear without erythema or exudates, moist mucous membranes                          Lungs: Clear to auscultation bilaterally, no wheezes or crackles                           Chest wall: No tenderness or de

## 2021-12-20 NOTE — PLAN OF CARE
81year old patient alert and oriented x with with confusion. Daughter at bedside the whole night. Patient  had blood transfusion 12/19/21 in morning but did not have hemoglobin re-checked after transfusion. STAT transfusion ordered yielded 8.2 hemoglobin.

## 2021-12-20 NOTE — PLAN OF CARE
Assumed care of patient @ 0730. No acute distress noted. C/o slight sob on exertion. VSS on RA, . Denies pain or discomfort. Due medications given. On IV zoysn and vanco. LT AC PIC SL, dressing dry and intact. Up x1 assist and walker.  Fall precautions i

## 2021-12-21 ENCOUNTER — APPOINTMENT (OUTPATIENT)
Dept: GENERAL RADIOLOGY | Facility: HOSPITAL | Age: 86
DRG: 178 | End: 2021-12-21
Attending: HOSPITALIST
Payer: MEDICARE

## 2021-12-21 LAB
ARTERIAL PATENCY WRIST A: POSITIVE
BASE EXCESS BLDA CALC-SCNC: -1.7 MMOL/L (ref ?–2)
BASOPHILS # BLD AUTO: 0.03 X10(3) UL (ref 0–0.2)
BASOPHILS NFR BLD AUTO: 0.2 %
BODY TEMPERATURE: 98.3 F
COHGB MFR BLD: 1.9 % SAT (ref 0–3)
EOSINOPHIL # BLD AUTO: 0.03 X10(3) UL (ref 0–0.7)
EOSINOPHIL NFR BLD AUTO: 0.2 %
ERYTHROCYTE [DISTWIDTH] IN BLOOD BY AUTOMATED COUNT: 20.7 %
FIO2: 21 %
HCO3 BLDA-SCNC: 21.3 MEQ/L (ref 22–26)
HCT VFR BLD AUTO: 23.3 %
HGB BLD-MCNC: 7.1 G/DL
HGB BLD-MCNC: 7.2 G/DL
IMM GRANULOCYTES # BLD AUTO: 0.1 X10(3) UL (ref 0–1)
IMM GRANULOCYTES NFR BLD: 0.7 %
LYMPHOCYTES # BLD AUTO: 0.56 X10(3) UL (ref 1–4)
LYMPHOCYTES NFR BLD AUTO: 3.8 %
MCH RBC QN AUTO: 31.8 PG (ref 26–34)
MCHC RBC AUTO-ENTMCNC: 30.5 G/DL (ref 31–37)
MCV RBC AUTO: 104.5 FL
METHGB MFR BLD: 0.4 % SAT (ref 0.4–1.5)
MONOCYTES # BLD AUTO: 1.51 X10(3) UL (ref 0.1–1)
MONOCYTES NFR BLD AUTO: 10.4 %
NEUTROPHILS # BLD AUTO: 12.34 X10 (3) UL (ref 1.5–7.7)
NEUTROPHILS # BLD AUTO: 12.34 X10(3) UL (ref 1.5–7.7)
NEUTROPHILS NFR BLD AUTO: 84.7 %
PCO2 BLDA: 28 MM HG (ref 35–45)
PH BLDA: 7.49 [PH] (ref 7.35–7.45)
PLATELET # BLD AUTO: 258 10(3)UL (ref 150–450)
PO2 BLDA: 67 MM HG (ref 80–105)
RBC # BLD AUTO: 2.23 X10(6)UL
SAO2 % BLDA FROM PO2: 95 % (ref 92–100)
SAO2 % BLDA: 95 % (ref 92–100)
WBC # BLD AUTO: 14.6 X10(3) UL (ref 4–11)

## 2021-12-21 PROCEDURE — 71045 X-RAY EXAM CHEST 1 VIEW: CPT | Performed by: HOSPITALIST

## 2021-12-21 NOTE — PLAN OF CARE
Assumed care of patient @ 0730. No acute distress noted. VSS on RA. Afib on tele, on elquis. On IV zoysn. Encourage IS qh.  Denies pain or discomfort. Up w/ 1-2 assist to bathroom. Monitor labs. Call light and belongings within reach.  Staff will continue t

## 2021-12-21 NOTE — PROGRESS NOTES
[address] Patient Status:  Inpatient    3/11/1935 MRN [id number]   [facility] 3NE-A Attending Evelyn Acuna MD   Hosp Day # 2 PCP Sahara Bustamante MD     SUBJECTIVE: Pt with low grade fever overnight.   He denies f membranes                          Lungs: Clear to auscultation bilaterally, no wheezes or crackles                           Chest wall: No tenderness or deformity.                           UGUSQ: OPWIGFC rate and rhythm, normal S1S2

## 2021-12-21 NOTE — PLAN OF CARE
Received patient awake in  bed. Up with 1-2 assist to the bathroom. On room air, denies any sob with walking. Afib on tele. , on Eliquis. Has low grade fever 99.6-100. On IV abx. Daughter at bedside and wife.     Problem: Patient/Family Goals  Goal: Patient

## 2021-12-22 LAB
BASOPHILS # BLD AUTO: 0.04 X10(3) UL (ref 0–0.2)
BASOPHILS NFR BLD AUTO: 0.2 %
EOSINOPHIL # BLD AUTO: 0.07 X10(3) UL (ref 0–0.7)
EOSINOPHIL NFR BLD AUTO: 0.4 %
ERYTHROCYTE [DISTWIDTH] IN BLOOD BY AUTOMATED COUNT: 20.2 %
HCT VFR BLD AUTO: 24 %
HGB BLD-MCNC: 7.7 G/DL
IMM GRANULOCYTES # BLD AUTO: 0.1 X10(3) UL (ref 0–1)
IMM GRANULOCYTES NFR BLD: 0.6 %
LYMPHOCYTES # BLD AUTO: 0.73 X10(3) UL (ref 1–4)
LYMPHOCYTES NFR BLD AUTO: 4.2 %
MCH RBC QN AUTO: 32.1 PG (ref 26–34)
MCHC RBC AUTO-ENTMCNC: 32.1 G/DL (ref 31–37)
MCV RBC AUTO: 100 FL
MONOCYTES # BLD AUTO: 1.64 X10(3) UL (ref 0.1–1)
MONOCYTES NFR BLD AUTO: 9.3 %
NEUTROPHILS # BLD AUTO: 15 X10 (3) UL (ref 1.5–7.7)
NEUTROPHILS # BLD AUTO: 15 X10(3) UL (ref 1.5–7.7)
NEUTROPHILS NFR BLD AUTO: 85.3 %
PLATELET # BLD AUTO: 296 10(3)UL (ref 150–450)
RBC # BLD AUTO: 2.4 X10(6)UL
WBC # BLD AUTO: 17.6 X10(3) UL (ref 4–11)

## 2021-12-22 NOTE — PROGRESS NOTES
Pulmonary Progress Note     Assessment / Plan:  1. Dyspnea - due to PNA and lung cancer. ABG consistent with mild hyperventilation   - supplemental O2 as needed  2. PNA - 1/2 blood cultures with +strep.  CXR with interval worsening of right mid and lower jil

## 2021-12-22 NOTE — PROGRESS NOTES
[facility] Hospitalist note    PCP: Jason Boyd MD    Chief Complaint:  F/u anemia, pna    SUBJECTIVE:  Increased WOB reported overnight- feels OK this AM  CBC stable    OBJECTIVE:  Temp:  [98 °F (36.7 °C)-99.1 °F (37.3 °C)] 99.1 °F (37.3 °C)  Pulse:  [74-10 acetaminophen **OR** HYDROcodone-acetaminophen **OR** HYDROcodone-acetaminophen, polyethylene glycol (PEG 3350), sennosides, bisacodyl, Fleet Enema, ondansetron, metoclopramide, Heparin Lock Flush       Assessment/Plan:     81year old male with PMH

## 2021-12-22 NOTE — PROGRESS NOTES
[facility] Hospitalist note    PCP: Constance Raymond MD    Chief Complaint:  F/u anemia, pna    SUBJECTIVE:  Feeling okay. Resting in bed.  tmax 100.3    OBJECTIVE:  Temp:  [98.5 °F (36.9 °C)-100.3 °F (37.9 °C)] 98.8 °F (37.1 °C)  Pulse:  [85-97] 94  Resp:  [18-20 3300), sennosides, bisacodyl, Fleet Enema, ondansetron, metoclopramide, Heparin Lock Flush       Assessment/Plan:     81year old male with PMH including but not limited to squamous cell lung CA, prostate ca, MM, afib on xarelto who p/t [facility] ED with anemia

## 2021-12-22 NOTE — PHYSICAL THERAPY NOTE
PHYSICAL THERAPY TREATMENT NOTE - INPATIENT    Room Number: 4834/1292-Q     Session: 1          Presenting Problem: fatigue, weakness  Co-Morbidities : a-fib, red cell aplasia, prostate Ca, lung Ca, multiple myeloma  ASSESSMENT     Pt seen this PM for t Static Sitting: Good  Dynamic Sitting: Good           Static Standing: Fair (with RW)  Dynamic Standing: Fair (with RW)    ACTIVITY TOLERANCE                         O2 WALK         AM-PAC '6-Clicks' INPATIENT session/findings; All patient questions and concerns addressed; Alarm set; Family present    Therapist PPE: Mask, gloves and goggles were worn.   Patient/Family PPE: Mask in hallway only, spouse in mask at all times

## 2021-12-22 NOTE — CM/SW NOTE
Received referral , darline simmons. Called son, left a message . Called nurse who stated son was in room. Went to room, wife present. Discussed hospice referral, wife stated that she did not want to meet , that pt was busy .   She stated that someone had talke

## 2021-12-22 NOTE — CM/SW NOTE
Hospice order noted. Referral sent to [facility], [facility]., spoke to Lore.

## 2021-12-22 NOTE — PROGRESS NOTES
A/ox4   RA  Continent  Denies pain   Home with home health recommended  Stand by assist with walker  Regular diet  Port a catch  Iv in r arm   Stable vitals   Pt visited  All needs met   Pt in bed with hob up and call light near  Staff will continue to mon

## 2021-12-22 NOTE — PHYSICAL THERAPY NOTE
Attempted to see pt this AM. Son at bs. Stated that pt has been up and walking at least to the restroom 4 times and has not been sleeping well last night.  Fixed recliner and encourage pt to be up after rest or during meal and mobilized with nursing at his

## 2021-12-22 NOTE — PLAN OF CARE
Assumed care at [address] [place] [address]. Pt is A&O x4. On RA, . Pt very tachypneic and appears to be using accessory muscles to breathe, but patient states he does not feel SOB. 02 sats WNL. Notified hospitalist and pulm and received orders for CXR and ABG.  A fib on tele

## 2021-12-23 ENCOUNTER — APPOINTMENT (OUTPATIENT)
Dept: GENERAL RADIOLOGY | Facility: HOSPITAL | Age: 86
DRG: 178 | End: 2021-12-23
Attending: INTERNAL MEDICINE
Payer: MEDICARE

## 2021-12-23 LAB
ANION GAP SERPL CALC-SCNC: 8 MMOL/L (ref 0–18)
BASOPHILS # BLD AUTO: 0.04 X10(3) UL (ref 0–0.2)
BASOPHILS NFR BLD AUTO: 0.3 %
BUN BLD-MCNC: 16 MG/DL (ref 7–18)
CALCIUM BLD-MCNC: 7.6 MG/DL (ref 8.5–10.1)
CHLORIDE SERPL-SCNC: 105 MMOL/L (ref 98–112)
CO2 SERPL-SCNC: 23 MMOL/L (ref 21–32)
CREAT BLD-MCNC: 0.78 MG/DL
EOSINOPHIL # BLD AUTO: 0.07 X10(3) UL (ref 0–0.7)
EOSINOPHIL NFR BLD AUTO: 0.4 %
ERYTHROCYTE [DISTWIDTH] IN BLOOD BY AUTOMATED COUNT: 19.9 %
GLUCOSE BLD-MCNC: 127 MG/DL (ref 70–99)
HCT VFR BLD AUTO: 21.9 %
HGB BLD-MCNC: 7.1 G/DL
IMM GRANULOCYTES # BLD AUTO: 0.08 X10(3) UL (ref 0–1)
IMM GRANULOCYTES NFR BLD: 0.5 %
LYMPHOCYTES # BLD AUTO: 0.49 X10(3) UL (ref 1–4)
LYMPHOCYTES NFR BLD AUTO: 3.1 %
MCH RBC QN AUTO: 32.6 PG (ref 26–34)
MCHC RBC AUTO-ENTMCNC: 32.4 G/DL (ref 31–37)
MCV RBC AUTO: 100.5 FL
MONOCYTES # BLD AUTO: 1.44 X10(3) UL (ref 0.1–1)
MONOCYTES NFR BLD AUTO: 9 %
NEUTROPHILS # BLD AUTO: 13.85 X10 (3) UL (ref 1.5–7.7)
NEUTROPHILS # BLD AUTO: 13.85 X10(3) UL (ref 1.5–7.7)
NEUTROPHILS NFR BLD AUTO: 86.7 %
OSMOLALITY SERPL CALC.SUM OF ELEC: 285 MOSM/KG (ref 275–295)
PLATELET # BLD AUTO: 284 10(3)UL (ref 150–450)
POTASSIUM SERPL-SCNC: 3.8 MMOL/L (ref 3.5–5.1)
RBC # BLD AUTO: 2.18 X10(6)UL
SODIUM SERPL-SCNC: 136 MMOL/L (ref 136–145)
WBC # BLD AUTO: 16 X10(3) UL (ref 4–11)

## 2021-12-23 PROCEDURE — 99223 1ST HOSP IP/OBS HIGH 75: CPT | Performed by: INTERNAL MEDICINE

## 2021-12-23 PROCEDURE — 71045 X-RAY EXAM CHEST 1 VIEW: CPT | Performed by: INTERNAL MEDICINE

## 2021-12-23 NOTE — OCCUPATIONAL THERAPY NOTE
Attempted to see patient for OT treatment today, however patient meeting with PC. Will continue to follow. Mitchell Manjarrez is a 53 year old male  Referred by Sae Yuen MD      Chief Complaint   Patient presents with   • Consultation     left sided weakness       Symptoms: left sided weakness    Duration of symptoms: 2019    Location: left sided      Detailed description: Had a stroke in 2017 and caused left sided weakness. Persistent left sided weakness. Approx in Nov 2019, developed weakness of left face, arm and leg. Came to hospital 3 times to hospital was not admitted and went AMA per Dr. Yuen's notes. Patient said he left because they said there was nothing wrong with him    Onset: acute onset    Progression: got better but still not back to baseline state     Aggravating: elevated BS, smoking.     Relieving: none    Duration symptoms lasted: persistent.     Meds taken: Plavix and Lipitor 80 mg daily. Physical and speech therapy. Also due for OT.     Associated symptoms    Weakness: left sided    Speech difficulty: slurred after stroke.     Visual: none    Dizziness: none    Headache: none    Numbness: did report    No sleep issues,    Past Medical History:   Diagnosis Date   • Aneurysm (CMS/[facility])     paraopthalmic aneurysm   • Benign essential hypertension 6/1/2007   • Cerebral infarction (CMS/[facility]) 01/12/2017    left sided leg weakness/oct 2019   • Edema     lower legs   • Erectile dysfunction 10/25/2017   • Hemiparesis, left (CMS/[facility]) 1/20/2017   • Hyperlipidemia    • Hyponatremia 12/4/2015   • Non morbid obesity due to excess calories 10/20/2015   • Polyneuropathy     due to diabetes   • Pure hypercholesterolemia 11/3/2015   • Stroke (CMS/[facility])    • Substance abuse (CMS/[facility])    • TMJ disease 2/16/2016   • Tobacco abuse 10/20/2015   • Type 2 diabetes mellitus without complication (CMS/[facility]) 2003    started insulin within 6 months as he was having problems with oral medications     Past Surgical History:   Procedure Laterality Date   • Hb circumcision  05/15/2018   • Ir bx lymph node superficial Left 10/17/2018     PAROTID   • Penile prosthesis implant  05/15/2018   • Removal gallbladder  2006    Cholecystectomy (gallbladder)     Family History   Problem Relation Age of Onset   • Hypertension Mother    • * Mother          , possibly cancer, lung   • Cancer Father         brain   • Stroke Father    • Cancer Paternal Grandfather         pt has multiple paternal family members that are  from cancer   • Diabetes Sister    • Diabetes Brother    • Stroke Brother    • Glaucoma Brother    • Diabetes Maternal Aunt    • Diabetes Paternal Aunt    • Genitourinary Neg Hx         neg gu cancers     Social History     Tobacco Use   • Smoking status: Current Every Day Smoker     Packs/day: 0.50     Years: 20.00     Pack years: 10.00     Types: Cigarettes   • Smokeless tobacco: Never Used   Substance Use Topics   • Alcohol use: No     Alcohol/week: 0.0 standard drinks     Frequency: Never     Drinks per session: 1 or 2     Binge frequency: Never   • Drug use: No       Current Outpatient Medications   Medication Sig   • metoPROLOL tartrate (LOPRESSOR) 100 MG tablet Take 1 tablet by mouth daily.   • lisinopril (PRINIVIL,ZESTRIL) 40 MG tablet Take 1 tablet by mouth daily.   • clopidogrel (PLAVIX) 75 MG tablet Take 1 tablet by mouth daily.   • atorvastatin (LIPITOR) 80 MG tablet Take 1 tablet by mouth daily.   • insulin glargine (LANTUS SOLOSTAR) 100 UNIT/ML pen-injector Inject 100 Units into the skin nightly.   • Insulin Lispro, 1 Unit Dial, (INSULIN LISPRO) 100 UNIT/ML pen-injector Inject 68 Units into the skin 3 times daily.   • ipratropium (ATROVENT) 0.06 % nasal spray SPRAY 2 SPRAYS IN EACH NOSTRIL THREE TIMES DAILY   • ONETOUCH VERIO test strip Test blood sugar 3 times daily as directed. Diagnosis: E11.65, Z79.4. Meter: One Touch Verio Flex   • ONETOUCH DELICA LANCETS 33G Misc USE TO TEST BLOOD SUGAR THREE TIMES DAILY AS DIRECTED   • Insulin Pen Needle (BD ULTRA-FINE PEN NEEDLES) 29G X 12.7MM Misc INJECT INSULIN AS DIRECTED  FOUR TIMES DAILY. PLEASE SCHEDULE FOLLOW UP APPOINTMENT FOR FURTHER REFILLS   • pregabalin (LYRICA) 225 MG capsule Take 1 capsule by mouth 2 times daily.   • polyethylene glycol (MIRALAX) powder Take 17 g by mouth 2 times daily.   • vitamin - therapeutic multivitamins w/minerals (CENTRUM SILVER,THERA-M) Tab Take 1 tablet by mouth daily.   • Omega-3 Fatty Acids (FISH OIL PO) Take 1 tablet by mouth daily.   • Cyanocobalamin (B-12 PO) Take 1 tablet by mouth daily.   • exenatide ER (BYDUREON) 2 MG pen-injector INJECT 2 MG INTO THE SKIN ONE DAY A WEEK.     No current facility-administered medications for this visit.          On examination:     Vitals:    20 1508   BP: 130/82   Pulse: 69   Resp: 16         Weight    20 1508   Weight: 135.2 kg       General Appearance: Pleasant and cooperative, normal in appearance        Cardiovascular: No carotid bruits.    NEUROLOGIC:        Higher cortical function:  The patient is alert, oriented times 3 to time, place and person.  Memory reduced;  attention span, concentration, language and fund of knowledge are normal.    Cranial nerves:  Pupils are 4 mm, reacting briskly to 2 mm without afferent pupillary defect.  Visual fields are intact to confrontation testing.  Funduscopic examination reveals sharp disk margins with normal vasculature.  No papilledema.  Extraocular movements are full and smooth with normal pursuits and saccades.  No nystagmus noted.  Normal facial sensation. No facial droop. Hearing is normal. Normal shoulder shrug. Symmetric palatal elevation and tongue protrusion.       Motor exam:       Bulk are within normal limits in all 4 extremities. Tone increased on left     Pronator drift on left. Lower extremity weaker on left      Deep Tendon Reflexes are brisker on left.       Sensation:  Intact to light touch.    Coordination:  Normal finger to nose testing.      Station: sitting.    Medical Decision Makin D echo: LVEF 64%  - Normal left  ventricular chamber size and systolic function.  - Normal right ventricular size and systolic function, RVSP 19.1 mmHg.  - No significant valvular abnormalities.      CT Head: FINDINGS:  The ventricles and cortical sulci are unchanged in size and  appearance to the recent previous study. Presumed small vessel ischemic  changes redemonstrated, notably in the periventricular white matter and  basal ganglia regions, stable.     No acute intracranial hemorrhage or acute cortical infarct is seen. No  midline shift or abnormal extra-axial fluid collection.     No significant sinusitis. Intracranial arterial calcifications seen. No  acute skull fracture.        IMPRESSION:       No acute intracranial hemorrhage or acute cortical infarct is seen.       (images were personally reviewed)      Labs: A1c 7.9      Reviewed notes from Sae Yuen MD outlining history and reasons for referral.     Impression/Plan: Mitchell was seen today for consultation.    Diagnoses and all orders for this visit:    Left-sided weakness: likely new stroke as well as prior stroke. 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 Stop smoking. On PLavix and Lipitor  -     HOLTER MONITOR; Future  -     LIPID PANEL WITH REFLEX; Future    Stenosis of carotid artery, unspecified laterality: Stop smoking. On PLavix and Lipitor  -     US CAROTID DUPLEX BILATERAL; Future    BENEDICTO (obstructive sleep apnea)  -     POLYSOMNOGRAPHY 4 OR MORE PARAMETERS; Future    Return in about 4 months (around 5/29/2020). Seek medical attention sooner in case of any worsening/new symptoms developing.    Mathew Almonte MD    01/29/20      Thank You very much for allowing me to participate in the care of this patient.      Copy: Sae Yuen MD

## 2021-12-23 NOTE — PLAN OF CARE
Patient alert and oriented x4. On Ra, . O2 walk completed. Sating 97-99% on RA. Denies pain. IV zosyn q8. Pt walked in halls twice as per family request.  Resting comfortably in bed. WCTM.

## 2021-12-23 NOTE — PROGRESS NOTES
[address] 80 Patient Status:  Inpatient    3/11/1935 MRN [id number]   [facility] 3NE-A Attending Jennifer Smith,*   Hosp Day # 4 PCP Kodi Drummond MD     SUBJECTIVE: Pt continues to have some coughing fit crackles                           Chest wall: No tenderness or deformity.                         BVDHM: YBPAICN rate and rhythm, normal S1S2                          Abdomen: soft, non-tender, non-distended, positive BS.   Aniket Titus

## 2021-12-23 NOTE — PROGRESS NOTES
[facility]idential Palliative Liaison received palliative referral for community PC services. Waiting to obtain insurance (verification/authorization) prior to pursuing.          Tae Funes  Residential Palliative Liaison   [phone]

## 2021-12-23 NOTE — DIETARY NOTE
[facility]  NUTRITION ASSESSMENT    Pt does not meet malnutrition criteria. NUTRITION INTERVENTION:    1. RD nutrition Care Plan- See RD nutrition assessment for additional recommendations  2.  Meal and Snacks - Continue Regular Diet as tolerated; m try [employer]. Suspect Malnutrition, but unable to perform NFPE due to pt sleeping. Await re-weigh for wt hx. PMH: prostate CA, Multiple Myeloma, afib, Lung CA    ANTHROPOMETRICS:  Ht: 182.9 cm (6')  Wt: 56.7 kg (124 lb 14.4 oz).    BMI: Body ma New  2. PO intake of 75% of oral nutrition supplement/s - New  3.  Weight stable within 1 to 2 lbs during admission - New      MEDICATIONS:  FA, Abx    LABS:  Na 135    Pt is at Moderate nutrition risk    FOLLOW-UP DATE: 12/28/21    Leobardo Momin RD, CHAVAN  C

## 2021-12-23 NOTE — CONSULTS
Anam [address] 56 Patient Status:  Inpatient    3/11/1935 MRN [id number]   [facility] 3NE-A Attending Norman Mendoza,*   Hosp Day # 4 PCP Julian Haynes MD     Date of Consult: showeed squamous cell lung cancer. Family mentioned that they will be discussing the results with his PCP Dr. Roro Shane on phone today and talk about options. Pt follows with Dr. Paul Art. Wife mentioned that they spoke with Dr. Paul Art on phone.    Wife does not decades)  2) May continue life-prolonging measures and treatments  3) Includes treatment of symptoms to improve quality of life while receiving above measures and treatments  4) Consultation services      Patient's preference about sharing medical informat Normal                         Full           Some Disease      80     Full                  Normal w/effort          Full                   Normal or reduced       Full                                   Some Disease      70    Reduced          Can't Perfo suppository 10 mg, 10 mg, Rectal, Daily PRN  •  Fleet Enema (FLEET) 7-19 GM/118ML enema 133 mL, 1 enema, Rectal, Once PRN  •  ondansetron (ZOFRAN) injection 4 mg, 4 mg, Intravenous, Q6H PRN  •  metoclopramide (REGLAN) injection 10 mg, 10 mg, Intravenous, Q lobe again noted unchanged. Right Port-A-Cath tip in SVC without pneumothorax. 5. Dextroscoliosis with moderate multiple level degenerative disc disease.      Dictated by (CST): Marycruz Kiran MD on 12/23/2021 at 12:29 PM     Finalized by (CST): Melissa Yes  Healthcare Agent's Name: Freddy Bassett  Healthcare Agent's Phone Number: [phone]                    Assessment/Recommendations:    Encounter for Palliative care/goals of care:   -- met with pt along with his wife and daughter at bedside.    -- t

## 2021-12-23 NOTE — PLAN OF CARE
Assumed PT at [address]. Alert and orientated times 3. Afib on tele, HR in the 100's. RA at 95%. RR at in the 20's-30's. PT denies pain. Zosyn q8. Eliquis for afib. Wife at bedside. PT resting in bed.    Problem: Patient/Family Goals  Goal: Patient/Family Nelson

## 2021-12-23 NOTE — PROGRESS NOTES
[facility] Hospitalist note    PCP: Misael Baptiste MD    Chief Complaint:  F/u anemia, pna    SUBJECTIVE:  Feels well today- sitting up in bed having breakfast with his daughter  Afebrile overnight  WBC 17.6 --> 16    OBJECTIVE:  Temp:  [98.3 °F (36.8 °C)-99.5 • folic acid  5 mg Oral Daily   • piperacillin-tazobactam  3.375 g Intravenous Q8H       acetaminophen **OR** HYDROcodone-acetaminophen **OR** HYDROcodone-acetaminophen, polyethylene glycol (PEG 3350), sennosides, bisacodyl, Fleet Enema, ondansetron, met

## 2021-12-24 LAB
ANTIBODY SCREEN: NEGATIVE
ERYTHROCYTE [DISTWIDTH] IN BLOOD BY AUTOMATED COUNT: 19.5 %
HCT VFR BLD AUTO: 20.6 %
HGB BLD-MCNC: 6.8 G/DL
HGB BLD-MCNC: 8.4 G/DL
MCH RBC QN AUTO: 32.9 PG (ref 26–34)
MCHC RBC AUTO-ENTMCNC: 33 G/DL (ref 31–37)
MCV RBC AUTO: 99.5 FL
PLATELET # BLD AUTO: 295 10(3)UL (ref 150–450)
RBC # BLD AUTO: 2.07 X10(6)UL
RH BLOOD TYPE: NEGATIVE
STREPTOCOCCUS DNA BLD POS NAA+NON-PROBE: DETECTED
WBC # BLD AUTO: 13.3 X10(3) UL (ref 4–11)

## 2021-12-24 RX ORDER — AMOXICILLIN AND CLAVULANATE POTASSIUM 875; 125 MG/1; MG/1
1 TABLET, FILM COATED ORAL 2 TIMES DAILY
Qty: 8 TABLET | Refills: 0 | Status: SHIPPED | OUTPATIENT
Start: 2021-12-24 | End: 2021-12-28

## 2021-12-24 RX ORDER — SODIUM CHLORIDE 9 MG/ML
INJECTION, SOLUTION INTRAVENOUS ONCE
Status: COMPLETED | OUTPATIENT
Start: 2021-12-24 | End: 2021-12-24

## 2021-12-24 NOTE — PROGRESS NOTES
[address] 80 Patient Status:  Inpatient    3/11/1935 MRN [id number]   [facility] 3NE-A Attending Anne Marie Hi,*   Hosp Day # 5 PCP Bijan Lemons MD     SUBJECTIVE: Pt denies complaints today.   Was up am membranes                          Lungs: Clear to auscultation bilaterally, no wheezes or crackles                           Chest wall: No tenderness or deformity.                           WSWSG: CAJLPUW rate and rhythm, normal S1S2

## 2021-12-24 NOTE — PHYSICAL THERAPY NOTE
PHYSICAL THERAPY TREATMENT NOTE - INPATIENT    Room Number: 4682/6303-L     Session: 2          Presenting Problem: fatigue, weakness  Co-Morbidities : a-fib, red cell aplasia, prostate Ca, lung Ca, multiple myeloma  ASSESSMENT     Patient currently granger currently have. ..   Patient Difficulty: Turning over in bed (including adjusting bedclothes, sheets and blankets)?: None   Patient Difficulty: Sitting down on and standing up from a chair with arms (e.g., wheelchair, bedside commode, etc.): None   Patient D

## 2021-12-24 NOTE — PLAN OF CARE
Assumed care of patient @ 0730. No acute distress noted. A&Ox4, forgetful at times. VSS on RA. Lungs clear/diminished. Denies pain or discomfort. Afebrile overnight. Up to chair by PT. On IV zoysn. Safety precautions in place.  Staff will continue to monit

## 2021-12-24 NOTE — DISCHARGE SUMMARY
Addendum to below-- of note, pt did not dc today due to receiving prbc transfusion.  See note from 12/25    [facility] Internal Medicine Discharge Summary    Patient ID:  Papito Krueger  [id number]  32 year old  3/11/1935    Admit date: 12/18/2021  Discharge RRR, +s1/s2  Lungs: CTAB, good respiratory effort  Abdomen: s/nt/nd  Ext: Moves all 4 extremities, no c/c/e  Neuro: CN Intact, no focal deficits    Discharge meds     Medication List      START taking these medications    amoxicillin clavulanate 875-125 MG hypoxia  PATIENT STATED HISTORY: (As transcribed by Technologist)  Patient offered no additional history at this time. CONCLUSION:  1.  Compared to the previous study, there is decrease in the ground-glass opacities and consolidation in the r small right pleural effusion which is slightly larger than on the previous exam.  Heart size is normal.  There is atheromatous plaque identified in the aorta. Stable prominent interstitial markings are seen in the left lung. There is no pneumothorax.  Deg disease. 2. Patchy opacity at the right greater than left lung base may represent pneumonia versus asymmetric pulmonary edema.     Dictated by (CST): Hussein Ambrocio MD on 12/19/2021 at 6:58 AM     Finalized by (CST): Hussein Ambrocio MD on 12/19/2021 at 7:01

## 2021-12-24 NOTE — PROGRESS NOTES
Pt A&Ox4 Room Air  Resting in bed  Family at th bedside  A-fib on Tele (Eliquis)  Denies pain at this time  Voids 1x walker  IV Zosyn (PNA)  Safety precaution maintained  Will continue to monitor

## 2021-12-24 NOTE — CM/SW NOTE
CM spoke to [facility] to follow up for patient discharge plan. [facility] consulted. CM to follow up prior to discharge. SW/CM to remain available for any further discharge planning needs.    Alisa Oconnell RN Case

## 2021-12-25 VITALS
SYSTOLIC BLOOD PRESSURE: 118 MMHG | HEART RATE: 104 BPM | HEIGHT: 72 IN | WEIGHT: 124.88 LBS | BODY MASS INDEX: 16.91 KG/M2 | RESPIRATION RATE: 33 BRPM | TEMPERATURE: 98 F | OXYGEN SATURATION: 95 % | DIASTOLIC BLOOD PRESSURE: 63 MMHG

## 2021-12-25 LAB — BLOOD TYPE BARCODE: 9500

## 2021-12-25 NOTE — CM/SW NOTE
Confirmed with residential liaison natalio--community palliative with residential as well as [facility] set up

## 2021-12-25 NOTE — PROGRESS NOTES
Please note that this is listed as a progress note but is actually a discharge summary      [facility] Internal Medicine Discharge Summary    Patient ID:  Shawnee Rausch  [id number]  59 year old  3/11/1935    Admit date: 12/18/2021  Discharge date and time: alert and oriented  CV: RRR, +s1/s2  Lungs: CTAB, good respiratory effort  Abdomen: s/nt/nd  Ext: Moves all 4 extremities, no c/c/e  Neuro: CN Intact, no focal deficits    Discharge meds     Medication List      START taking these medications    amoxicilli 0:10 AM.  INDICATIONS:  hypoxia  PATIENT STATED HISTORY: (As transcribed by Technologist)  Patient offered no additional history at this time. CONCLUSION:  1.  Compared to the previous study, there is decrease in the ground-glass opacities an pneumonia. There is a small right pleural effusion which is slightly larger than on the previous exam.  Heart size is normal.  There is atheromatous plaque identified in the aorta. Stable prominent interstitial markings are seen in the left lung.   There represent neoplastic disease. 2. Patchy opacity at the right greater than left lung base may represent pneumonia versus asymmetric pulmonary edema.     Dictated by (CST): Kae Dowling MD on 12/19/2021 at 6:58 AM     Finalized by (CST): Kae Dowling MD o

## 2021-12-25 NOTE — PLAN OF CARE
NURSING DISCHARGE NOTE    Discharged Home via Wheelchair. Accompanied by Support staff  Belongings Taken by patient/family. Pt discharged in calm, stable status to home. Discharge paperwork provided & discussed, pt & pt's wife verbalized understanding.

## 2021-12-25 NOTE — PLAN OF CARE
Pt A&Ox4 Room Air  Resting in bed  Family at the bedside(Pt Wife)  A-fib on Tele (Eliquis)  Denies pain at this time  Voids 1x walker  IV Zosyn (PNA)  Possible DC  Safety precaution maintained  Will continue to monitor    Problem: [address]

## 2021-12-27 NOTE — PROGRESS NOTES
Residential Palliative Liaison received palliative referral for community PC services. Liaison spoke with wife Linsey Combs via phone to discuss Residential PC services. Residential PC services discussed and is agreeable  to our [facility] services upon DC home.       Ter

## 2021-12-28 ENCOUNTER — PATIENT OUTREACH (OUTPATIENT)
Dept: CASE MANAGEMENT | Age: 86
End: 2021-12-28

## 2021-12-28 NOTE — PROGRESS NOTES
Received VM from patients daughter Haley Perez    Returned [place] and Haley Perez stated patient needed an with pulmonary, informed here there is a message into pulmonary and the nurses will be contacting her to schedule apt, she voiced understanding.  No scheduling assist

## 2022-01-01 ENCOUNTER — TELEPHONE (OUTPATIENT)
Dept: HEMATOLOGY/ONCOLOGY | Facility: HOSPITAL | Age: 87
End: 2022-01-01

## 2022-01-01 ENCOUNTER — APPOINTMENT (OUTPATIENT)
Dept: GENERAL RADIOLOGY | Facility: HOSPITAL | Age: 87
End: 2022-01-01
Attending: SPECIALIST
Payer: MEDICARE

## 2022-01-01 ENCOUNTER — APPOINTMENT (OUTPATIENT)
Dept: GENERAL RADIOLOGY | Facility: HOSPITAL | Age: 87
End: 2022-01-01
Attending: EMERGENCY MEDICINE
Payer: MEDICARE

## 2022-01-01 ENCOUNTER — APPOINTMENT (OUTPATIENT)
Dept: CV DIAGNOSTICS | Facility: HOSPITAL | Age: 87
End: 2022-01-01
Attending: SPECIALIST
Payer: MEDICARE

## 2022-01-01 ENCOUNTER — HOSPITAL ENCOUNTER (INPATIENT)
Facility: HOSPITAL | Age: 87
LOS: 2 days | End: 2022-01-01
Attending: EMERGENCY MEDICINE | Admitting: HOSPITALIST
Payer: MEDICARE

## 2022-01-01 ENCOUNTER — APPOINTMENT (OUTPATIENT)
Dept: CT IMAGING | Facility: HOSPITAL | Age: 87
End: 2022-01-01
Attending: EMERGENCY MEDICINE
Payer: MEDICARE

## 2022-01-01 ENCOUNTER — LAB REQUISITION (OUTPATIENT)
Dept: LAB | Facility: HOSPITAL | Age: 87
End: 2022-01-01
Payer: MEDICARE

## 2022-01-01 ENCOUNTER — HOSPITAL ENCOUNTER (INPATIENT)
Facility: HOSPITAL | Age: 87
LOS: 2 days | Discharge: HOME | End: 2022-01-01
Attending: EMERGENCY MEDICINE | Admitting: INTERNAL MEDICINE
Payer: MEDICARE

## 2022-01-01 VITALS
DIASTOLIC BLOOD PRESSURE: 63 MMHG | HEART RATE: 81 BPM | BODY MASS INDEX: 16 KG/M2 | OXYGEN SATURATION: 94 % | SYSTOLIC BLOOD PRESSURE: 112 MMHG | TEMPERATURE: 97 F | WEIGHT: 121.25 LBS | RESPIRATION RATE: 18 BRPM

## 2022-01-01 VITALS
WEIGHT: 128.31 LBS | OXYGEN SATURATION: 34 % | DIASTOLIC BLOOD PRESSURE: 58 MMHG | TEMPERATURE: 97 F | SYSTOLIC BLOOD PRESSURE: 109 MMHG | BODY MASS INDEX: 17 KG/M2

## 2022-01-01 DIAGNOSIS — C34.11 MALIGNANT NEOPLASM OF UPPER LOBE OF RIGHT LUNG (HCC): Primary | ICD-10-CM

## 2022-01-01 DIAGNOSIS — C34.91 NON-SMALL CELL CANCER OF RIGHT LUNG (HCC): Primary | ICD-10-CM

## 2022-01-01 DIAGNOSIS — R09.02 HYPOXIA: ICD-10-CM

## 2022-01-01 DIAGNOSIS — J18.9 PNEUMONIA OF RIGHT UPPER LOBE DUE TO INFECTIOUS ORGANISM: ICD-10-CM

## 2022-01-01 DIAGNOSIS — C34.11 MALIGNANT NEOPLASM OF UPPER LOBE, RIGHT BRONCHUS OR LUNG (HCC): ICD-10-CM

## 2022-01-01 DIAGNOSIS — C90.00 MULTIPLE MYELOMA NOT HAVING ACHIEVED REMISSION (HCC): ICD-10-CM

## 2022-01-01 DIAGNOSIS — J90 PLEURAL EFFUSION: ICD-10-CM

## 2022-01-01 DIAGNOSIS — I48.91 ATRIAL FIBRILLATION WITH RVR (HCC): ICD-10-CM

## 2022-01-01 LAB
ALBUMIN SERPL-MCNC: 1.5 G/DL (ref 3.4–5)
ALBUMIN SERPL-MCNC: 1.7 G/DL (ref 3.4–5)
ALBUMIN SERPL-MCNC: 1.9 G/DL (ref 3.4–5)
ALBUMIN SERPL-MCNC: 2.3 G/DL (ref 3.4–5)
ALBUMIN/GLOB SERPL: 0.3 {RATIO} (ref 1–2)
ALP LIVER SERPL-CCNC: 109 U/L
ALP LIVER SERPL-CCNC: 82 U/L
ALP LIVER SERPL-CCNC: 92 U/L
ALP LIVER SERPL-CCNC: 98 U/L
ALT SERPL-CCNC: 10 U/L
ALT SERPL-CCNC: 13 U/L
ALT SERPL-CCNC: 15 U/L
ALT SERPL-CCNC: 20 U/L
ANION GAP SERPL CALC-SCNC: 3 MMOL/L (ref 0–18)
ANION GAP SERPL CALC-SCNC: 4 MMOL/L (ref 0–18)
ANION GAP SERPL CALC-SCNC: 6 MMOL/L (ref 0–18)
ANION GAP SERPL CALC-SCNC: 7 MMOL/L (ref 0–18)
ANION GAP SERPL CALC-SCNC: 9 MMOL/L (ref 0–18)
APTT PPP: 28.6 SECONDS (ref 23.3–35.6)
ARTERIAL PATENCY WRIST A: POSITIVE
ARTERIAL PATENCY WRIST A: POSITIVE
AST SERPL-CCNC: 11 U/L (ref 15–37)
AST SERPL-CCNC: 12 U/L (ref 15–37)
AST SERPL-CCNC: 14 U/L (ref 15–37)
AST SERPL-CCNC: 27 U/L (ref 15–37)
ATRIAL RATE: 120 BPM
ATRIAL RATE: 340 BPM
BASE EXCESS BLDA CALC-SCNC: -0.2 MMOL/L (ref ?–2)
BASE EXCESS BLDA CALC-SCNC: -7.3 MMOL/L (ref ?–2)
BASOPHILS # BLD AUTO: 0.02 X10(3) UL (ref 0–0.2)
BASOPHILS # BLD AUTO: 0.02 X10(3) UL (ref 0–0.2)
BASOPHILS # BLD AUTO: 0.03 X10(3) UL (ref 0–0.2)
BASOPHILS NFR BLD AUTO: 0.2 %
BASOPHILS NFR BLD AUTO: 0.4 %
BILIRUB SERPL-MCNC: 0.5 MG/DL (ref 0.1–2)
BILIRUB SERPL-MCNC: 0.7 MG/DL (ref 0.1–2)
BILIRUB UR QL STRIP.AUTO: NEGATIVE
BODY TEMPERATURE: 98.6 F
BODY TEMPERATURE: 98.6 F
BUN BLD-MCNC: 15 MG/DL (ref 7–18)
BUN BLD-MCNC: 16 MG/DL (ref 7–18)
BUN BLD-MCNC: 18 MG/DL (ref 7–18)
BUN BLD-MCNC: 22 MG/DL (ref 7–18)
BUN BLD-MCNC: 23 MG/DL (ref 7–18)
CA-I BLD-SCNC: 1.12 MMOL/L (ref 0.95–1.32)
CALCIUM BLD-MCNC: 6.8 MG/DL (ref 8.5–10.1)
CALCIUM BLD-MCNC: 7.6 MG/DL (ref 8.5–10.1)
CALCIUM BLD-MCNC: 7.8 MG/DL (ref 8.5–10.1)
CALCIUM BLD-MCNC: 7.9 MG/DL (ref 8.5–10.1)
CALCIUM BLD-MCNC: 8.7 MG/DL (ref 8.5–10.1)
CHLORIDE SERPL-SCNC: 101 MMOL/L (ref 98–112)
CHLORIDE SERPL-SCNC: 102 MMOL/L (ref 98–112)
CHLORIDE SERPL-SCNC: 103 MMOL/L (ref 98–112)
CHLORIDE SERPL-SCNC: 109 MMOL/L (ref 98–112)
CHLORIDE SERPL-SCNC: 97 MMOL/L (ref 98–112)
CO2 SERPL-SCNC: 20 MMOL/L (ref 21–32)
CO2 SERPL-SCNC: 21 MMOL/L (ref 21–32)
CO2 SERPL-SCNC: 26 MMOL/L (ref 21–32)
CO2 SERPL-SCNC: 27 MMOL/L (ref 21–32)
CO2 SERPL-SCNC: 28 MMOL/L (ref 21–32)
COHGB MFR BLD: 1.1 % SAT (ref 0–3)
COHGB MFR BLD: 2.4 % SAT (ref 0–3)
COLOR UR AUTO: YELLOW
CREAT BLD-MCNC: 0.57 MG/DL
CREAT BLD-MCNC: 0.71 MG/DL
CREAT BLD-MCNC: 0.86 MG/DL
CREAT BLD-MCNC: 0.92 MG/DL
CREAT BLD-MCNC: 1.12 MG/DL
D DIMER PPP FEU-MCNC: 1.4 UG/ML FEU (ref ?–0.86)
EOSINOPHIL # BLD AUTO: 0.01 X10(3) UL (ref 0–0.7)
EOSINOPHIL # BLD AUTO: 0.03 X10(3) UL (ref 0–0.7)
EOSINOPHIL # BLD AUTO: 0.11 X10(3) UL (ref 0–0.7)
EOSINOPHIL NFR BLD AUTO: 0.1 %
EOSINOPHIL NFR BLD AUTO: 0.2 %
EOSINOPHIL NFR BLD AUTO: 1.3 %
ERYTHROCYTE [DISTWIDTH] IN BLOOD BY AUTOMATED COUNT: 13 %
ERYTHROCYTE [DISTWIDTH] IN BLOOD BY AUTOMATED COUNT: 20.7 %
ERYTHROCYTE [DISTWIDTH] IN BLOOD BY AUTOMATED COUNT: 20.7 %
ERYTHROCYTE [DISTWIDTH] IN BLOOD BY AUTOMATED COUNT: 20.8 %
ERYTHROCYTE [DISTWIDTH] IN BLOOD BY AUTOMATED COUNT: 21 %
ERYTHROCYTE [DISTWIDTH] IN BLOOD BY AUTOMATED COUNT: 21 %
FIO2: 100 %
GFR SERPLBLD BASED ON 1.73 SQ M-ARVRAT: 64 ML/MIN/1.73M2 (ref 60–?)
GFR SERPLBLD BASED ON 1.73 SQ M-ARVRAT: 81 ML/MIN/1.73M2 (ref 60–?)
GFR SERPLBLD BASED ON 1.73 SQ M-ARVRAT: 84 ML/MIN/1.73M2 (ref 60–?)
GFR SERPLBLD BASED ON 1.73 SQ M-ARVRAT: 89 ML/MIN/1.73M2 (ref 60–?)
GFR SERPLBLD BASED ON 1.73 SQ M-ARVRAT: 95 ML/MIN/1.73M2 (ref 60–?)
GLOBULIN PLAS-MCNC: 5.1 G/DL (ref 2.8–4.4)
GLOBULIN PLAS-MCNC: 5.2 G/DL (ref 2.8–4.4)
GLOBULIN PLAS-MCNC: 5.9 G/DL (ref 2.8–4.4)
GLOBULIN PLAS-MCNC: 6.6 G/DL (ref 2.8–4.4)
GLUCOSE BLD-MCNC: 112 MG/DL (ref 70–99)
GLUCOSE BLD-MCNC: 122 MG/DL (ref 70–99)
GLUCOSE BLD-MCNC: 124 MG/DL (ref 70–99)
GLUCOSE BLD-MCNC: 128 MG/DL (ref 70–99)
GLUCOSE BLD-MCNC: 143 MG/DL (ref 70–99)
GLUCOSE BLD-MCNC: 213 MG/DL (ref 70–99)
GLUCOSE UR STRIP.AUTO-MCNC: NEGATIVE MG/DL
HCO3 BLDA-SCNC: 19.2 MEQ/L (ref 21–27)
HCO3 BLDA-SCNC: 24.7 MEQ/L (ref 21–27)
HCT VFR BLD AUTO: 30.8 %
HCT VFR BLD AUTO: 31 %
HCT VFR BLD AUTO: 31.9 %
HCT VFR BLD AUTO: 33.1 %
HCT VFR BLD AUTO: 34.3 %
HCT VFR BLD AUTO: 39.1 %
HGB BLD-MCNC: 10 G/DL
HGB BLD-MCNC: 10.3 G/DL
HGB BLD-MCNC: 10.5 G/DL
HGB BLD-MCNC: 10.9 G/DL
HGB BLD-MCNC: 12.4 G/DL
HGB BLD-MCNC: 8.9 G/DL
HGB BLD-MCNC: 9.3 G/DL
HGB BLD-MCNC: 9.6 G/DL
IMM GRANULOCYTES # BLD AUTO: 0.03 X10(3) UL (ref 0–1)
IMM GRANULOCYTES # BLD AUTO: 0.04 X10(3) UL (ref 0–1)
IMM GRANULOCYTES # BLD AUTO: 0.05 X10(3) UL (ref 0–1)
IMM GRANULOCYTES # BLD AUTO: 0.06 X10(3) UL (ref 0–1)
IMM GRANULOCYTES # BLD AUTO: 0.3 X10(3) UL (ref 0–1)
IMM GRANULOCYTES NFR BLD: 0.3 %
IMM GRANULOCYTES NFR BLD: 0.3 %
IMM GRANULOCYTES NFR BLD: 0.4 %
IMM GRANULOCYTES NFR BLD: 0.4 %
IMM GRANULOCYTES NFR BLD: 2.1 %
INR BLD: 1.24 (ref 0.85–1.16)
INR BLD: 1.39 (ref 0.85–1.16)
KETONES UR STRIP.AUTO-MCNC: NEGATIVE MG/DL
L PNEUMO AG UR QL: NEGATIVE
LACTATE BLD-SCNC: 3.5 MMOL/L (ref 0.5–2)
LACTATE SERPL-SCNC: 0.8 MMOL/L (ref 0.4–2)
LACTATE SERPL-SCNC: 1 MMOL/L (ref 0.4–2)
LACTATE SERPL-SCNC: 2.3 MMOL/L (ref 0.4–2)
LACTATE SERPL-SCNC: 5.9 MMOL/L (ref 0.4–2)
LEUKOCYTE ESTERASE UR QL STRIP.AUTO: NEGATIVE
LYMPHOCYTES # BLD AUTO: 0.46 X10(3) UL (ref 1–4)
LYMPHOCYTES # BLD AUTO: 0.47 X10(3) UL (ref 1–4)
LYMPHOCYTES # BLD AUTO: 0.76 X10(3) UL (ref 1–4)
LYMPHOCYTES # BLD AUTO: 0.83 X10(3) UL (ref 1–4)
LYMPHOCYTES # BLD AUTO: 1.19 X10(3) UL (ref 1–4)
LYMPHOCYTES NFR BLD AUTO: 10 %
LYMPHOCYTES NFR BLD AUTO: 3.5 %
LYMPHOCYTES NFR BLD AUTO: 3.7 %
LYMPHOCYTES NFR BLD AUTO: 4.4 %
LYMPHOCYTES NFR BLD AUTO: 8.3 %
MAGNESIUM SERPL-MCNC: 1.7 MG/DL (ref 1.6–2.6)
MAGNESIUM SERPL-MCNC: 2.2 MG/DL (ref 1.6–2.6)
MCH RBC QN AUTO: 28.7 PG (ref 26–34)
MCH RBC QN AUTO: 28.7 PG (ref 26–34)
MCH RBC QN AUTO: 29.1 PG (ref 26–34)
MCH RBC QN AUTO: 29.2 PG (ref 26–34)
MCH RBC QN AUTO: 29.3 PG (ref 26–34)
MCH RBC QN AUTO: 30.6 PG (ref 26–34)
MCHC RBC AUTO-ENTMCNC: 30.2 G/DL (ref 31–37)
MCHC RBC AUTO-ENTMCNC: 31 G/DL (ref 31–37)
MCHC RBC AUTO-ENTMCNC: 31.1 G/DL (ref 31–37)
MCHC RBC AUTO-ENTMCNC: 31.3 G/DL (ref 31–37)
MCHC RBC AUTO-ENTMCNC: 31.7 G/DL (ref 31–37)
MCHC RBC AUTO-ENTMCNC: 31.8 G/DL (ref 31–37)
MCV RBC AUTO: 91.4 FL
MCV RBC AUTO: 91.7 FL
MCV RBC AUTO: 92.8 FL
MCV RBC AUTO: 94 FL
MCV RBC AUTO: 96.5 FL
MCV RBC AUTO: 96.6 FL
METHGB MFR BLD: 0.8 % SAT (ref 0.4–1.5)
METHGB MFR BLD: 1 % SAT (ref 0.4–1.5)
MONOCYTES # BLD AUTO: 0.65 X10(3) UL (ref 0.1–1)
MONOCYTES # BLD AUTO: 0.72 X10(3) UL (ref 0.1–1)
MONOCYTES # BLD AUTO: 0.85 X10(3) UL (ref 0.1–1)
MONOCYTES # BLD AUTO: 0.89 X10(3) UL (ref 0.1–1)
MONOCYTES # BLD AUTO: 0.96 X10(3) UL (ref 0.1–1)
MONOCYTES NFR BLD AUTO: 10.3 %
MONOCYTES NFR BLD AUTO: 4.6 %
MONOCYTES NFR BLD AUTO: 5.2 %
MONOCYTES NFR BLD AUTO: 5.7 %
MONOCYTES NFR BLD AUTO: 7.4 %
MYCOPLASMA PHEUMONIA AB, IGG: 1.19 U/L
MYCOPLASMA PNEUMONIAE AB, IGM: 0 U/L
NEUTROPHILS # BLD AUTO: 11.31 X10 (3) UL (ref 1.5–7.7)
NEUTROPHILS # BLD AUTO: 11.31 X10(3) UL (ref 1.5–7.7)
NEUTROPHILS # BLD AUTO: 11.51 X10 (3) UL (ref 1.5–7.7)
NEUTROPHILS # BLD AUTO: 11.51 X10(3) UL (ref 1.5–7.7)
NEUTROPHILS # BLD AUTO: 12.08 X10 (3) UL (ref 1.5–7.7)
NEUTROPHILS # BLD AUTO: 12.08 X10(3) UL (ref 1.5–7.7)
NEUTROPHILS # BLD AUTO: 15.43 X10 (3) UL (ref 1.5–7.7)
NEUTROPHILS # BLD AUTO: 15.43 X10(3) UL (ref 1.5–7.7)
NEUTROPHILS # BLD AUTO: 6.44 X10 (3) UL (ref 1.5–7.7)
NEUTROPHILS # BLD AUTO: 6.44 X10(3) UL (ref 1.5–7.7)
NEUTROPHILS NFR BLD AUTO: 77.6 %
NEUTROPHILS NFR BLD AUTO: 84.7 %
NEUTROPHILS NFR BLD AUTO: 88.4 %
NEUTROPHILS NFR BLD AUTO: 89.7 %
NEUTROPHILS NFR BLD AUTO: 90 %
NITRITE UR QL STRIP.AUTO: NEGATIVE
NT-PROBNP SERPL-MCNC: 1815 PG/ML (ref ?–450)
NT-PROBNP SERPL-MCNC: 2512 PG/ML (ref ?–450)
OSMOLALITY SERPL CALC.SUM OF ELEC: 275 MOSM/KG (ref 275–295)
OSMOLALITY SERPL CALC.SUM OF ELEC: 277 MOSM/KG (ref 275–295)
OSMOLALITY SERPL CALC.SUM OF ELEC: 277 MOSM/KG (ref 275–295)
OSMOLALITY SERPL CALC.SUM OF ELEC: 284 MOSM/KG (ref 275–295)
OSMOLALITY SERPL CALC.SUM OF ELEC: 286 MOSM/KG (ref 275–295)
OXYHGB MFR BLDA: 94.8 % (ref 92–100)
OXYHGB MFR BLDA: 96.6 % (ref 92–100)
P/F RATIO: 182 MMHG
PCO2 BLDA: 36 MM HG (ref 35–45)
PCO2 BLDA: 46 MM HG (ref 35–45)
PEEP: 5 CM H2O
PH BLDA: 7.24 [PH] (ref 7.35–7.45)
PH BLDA: 7.43 [PH] (ref 7.35–7.45)
PH UR STRIP.AUTO: 5 [PH] (ref 5–8)
PLATELET # BLD AUTO: 208 10(3)UL (ref 150–450)
PLATELET # BLD AUTO: 340 10(3)UL (ref 150–450)
PLATELET # BLD AUTO: 352 10(3)UL (ref 150–450)
PLATELET # BLD AUTO: 355 10(3)UL (ref 150–450)
PLATELET # BLD AUTO: 357 10(3)UL (ref 150–450)
PLATELET # BLD AUTO: 371 10(3)UL (ref 150–450)
PO2 BLDA: 182 MM HG (ref 80–100)
PO2 BLDA: 85 MM HG (ref 80–100)
POTASSIUM BLD-SCNC: 4.4 MMOL/L (ref 3.6–5.1)
POTASSIUM SERPL-SCNC: 3.6 MMOL/L (ref 3.5–5.1)
POTASSIUM SERPL-SCNC: 3.8 MMOL/L (ref 3.5–5.1)
POTASSIUM SERPL-SCNC: 4 MMOL/L (ref 3.5–5.1)
POTASSIUM SERPL-SCNC: 4.5 MMOL/L (ref 3.5–5.1)
POTASSIUM SERPL-SCNC: 4.5 MMOL/L (ref 3.5–5.1)
PROCALCITONIN SERPL-MCNC: <0.05 NG/ML (ref ?–0.16)
PROT SERPL-MCNC: 6.6 G/DL (ref 6.4–8.2)
PROT SERPL-MCNC: 6.9 G/DL (ref 6.4–8.2)
PROT SERPL-MCNC: 7.8 G/DL (ref 6.4–8.2)
PROT SERPL-MCNC: 8.9 G/DL (ref 6.4–8.2)
PROT UR STRIP.AUTO-MCNC: NEGATIVE MG/DL
PROTHROMBIN TIME: 15.6 SECONDS (ref 11.6–14.8)
PROTHROMBIN TIME: 17 SECONDS (ref 11.6–14.8)
Q-T INTERVAL: 332 MS
Q-T INTERVAL: 338 MS
QRS DURATION: 74 MS
QRS DURATION: 74 MS
QTC CALCULATION (BEZET): 428 MS
QTC CALCULATION (BEZET): 493 MS
R AXIS: -54 DEGREES
R AXIS: -63 DEGREES
RBC # BLD AUTO: 3.19 X10(6)UL
RBC # BLD AUTO: 3.34 X10(6)UL
RBC # BLD AUTO: 3.49 X10(6)UL
RBC # BLD AUTO: 3.52 X10(6)UL
RBC # BLD AUTO: 3.74 X10(6)UL
RBC # BLD AUTO: 4.05 X10(6)UL
RBC UR QL AUTO: NEGATIVE
SARS-COV-2 RNA RESP QL NAA+PROBE: NOT DETECTED
SARS-COV-2 RNA RESP QL NAA+PROBE: NOT DETECTED
SODIUM BLD-SCNC: 129 MMOL/L (ref 135–145)
SODIUM SERPL-SCNC: 131 MMOL/L (ref 136–145)
SODIUM SERPL-SCNC: 131 MMOL/L (ref 136–145)
SODIUM SERPL-SCNC: 132 MMOL/L (ref 136–145)
SODIUM SERPL-SCNC: 132 MMOL/L (ref 136–145)
SODIUM SERPL-SCNC: 137 MMOL/L (ref 136–145)
SP GR UR STRIP.AUTO: 1.02 (ref 1–1.03)
STREP PNEUMO ANTIGEN, URINE: NEGATIVE
T AXIS: 63 DEGREES
T AXIS: 70 DEGREES
TIDAL VOLUME: 450 ML
TRIGL SERPL-MCNC: 85 MG/DL (ref 30–149)
TROPONIN I HIGH SENSITIVITY: 12 NG/L
UROBILINOGEN UR STRIP.AUTO-MCNC: <2 MG/DL
VENT RATE: 16 /MIN
VENTRICULAR RATE: 100 BPM
VENTRICULAR RATE: 128 BPM
WBC # BLD AUTO: 12.6 X10(3) UL (ref 4–11)
WBC # BLD AUTO: 13 X10(3) UL (ref 4–11)
WBC # BLD AUTO: 14.3 X10(3) UL (ref 4–11)
WBC # BLD AUTO: 15.8 X10(3) UL (ref 4–11)
WBC # BLD AUTO: 17.2 X10(3) UL (ref 4–11)
WBC # BLD AUTO: 8.3 X10(3) UL (ref 4–11)

## 2022-01-01 PROCEDURE — 5A1935Z RESPIRATORY VENTILATION, LESS THAN 24 CONSECUTIVE HOURS: ICD-10-PCS | Performed by: ANESTHESIOLOGY

## 2022-01-01 PROCEDURE — 3E033XZ INTRODUCTION OF VASOPRESSOR INTO PERIPHERAL VEIN, PERCUTANEOUS APPROACH: ICD-10-PCS | Performed by: INTERNAL MEDICINE

## 2022-01-01 PROCEDURE — 99232 SBSQ HOSP IP/OBS MODERATE 35: CPT | Performed by: INTERNAL MEDICINE

## 2022-01-01 PROCEDURE — 99223 1ST HOSP IP/OBS HIGH 75: CPT | Performed by: INTERNAL MEDICINE

## 2022-01-01 PROCEDURE — 99223 1ST HOSP IP/OBS HIGH 75: CPT | Performed by: SPECIALIST

## 2022-01-01 PROCEDURE — 85025 COMPLETE CBC W/AUTO DIFF WBC: CPT | Performed by: INTERNAL MEDICINE

## 2022-01-01 PROCEDURE — 71045 X-RAY EXAM CHEST 1 VIEW: CPT | Performed by: EMERGENCY MEDICINE

## 2022-01-01 PROCEDURE — 99222 1ST HOSP IP/OBS MODERATE 55: CPT | Performed by: STUDENT IN AN ORGANIZED HEALTH CARE EDUCATION/TRAINING PROGRAM

## 2022-01-01 PROCEDURE — 93306 TTE W/DOPPLER COMPLETE: CPT | Performed by: SPECIALIST

## 2022-01-01 PROCEDURE — 71045 X-RAY EXAM CHEST 1 VIEW: CPT | Performed by: NURSE PRACTITIONER

## 2022-01-01 PROCEDURE — 71275 CT ANGIOGRAPHY CHEST: CPT | Performed by: EMERGENCY MEDICINE

## 2022-01-01 PROCEDURE — 5A12012 PERFORMANCE OF CARDIAC OUTPUT, SINGLE, MANUAL: ICD-10-PCS | Performed by: INTERNAL MEDICINE

## 2022-01-01 PROCEDURE — 0BH18EZ INSERTION OF ENDOTRACHEAL AIRWAY INTO TRACHEA, VIA NATURAL OR ARTIFICIAL OPENING ENDOSCOPIC: ICD-10-PCS | Performed by: ANESTHESIOLOGY

## 2022-01-01 PROCEDURE — 99291 CRITICAL CARE FIRST HOUR: CPT | Performed by: HOSPITALIST

## 2022-01-01 RX ORDER — GLYCOPYRROLATE 0.2 MG/ML
0.4 INJECTION, SOLUTION INTRAMUSCULAR; INTRAVENOUS
Status: DISCONTINUED | OUTPATIENT
Start: 2022-01-01 | End: 2022-01-01

## 2022-01-01 RX ORDER — ENOXAPARIN SODIUM 100 MG/ML
40 INJECTION SUBCUTANEOUS DAILY
Status: CANCELLED | OUTPATIENT
Start: 2022-01-01

## 2022-01-01 RX ORDER — AMOXICILLIN AND CLAVULANATE POTASSIUM 875; 125 MG/1; MG/1
1 TABLET, FILM COATED ORAL 2 TIMES DAILY
COMMUNITY
Start: 2022-01-01 | End: 2022-10-19

## 2022-01-01 RX ORDER — POLYETHYLENE GLYCOL 3350 17 G/17G
17 POWDER, FOR SOLUTION ORAL DAILY PRN
Status: DISCONTINUED | OUTPATIENT
Start: 2022-01-01 | End: 2022-01-01

## 2022-01-01 RX ORDER — SODIUM CHLORIDE 9 MG/ML
INJECTION, SOLUTION INTRAVENOUS CONTINUOUS
Status: DISCONTINUED | OUTPATIENT
Start: 2022-01-01 | End: 2022-01-01

## 2022-01-01 RX ORDER — LORAZEPAM 2 MG/ML
1 INJECTION INTRAMUSCULAR ONCE
Status: COMPLETED | OUTPATIENT
Start: 2022-01-01 | End: 2022-01-01

## 2022-01-01 RX ORDER — ACETAMINOPHEN 500 MG
500 TABLET ORAL EVERY 4 HOURS PRN
Status: DISCONTINUED | OUTPATIENT
Start: 2022-01-01 | End: 2022-01-01

## 2022-01-01 RX ORDER — SODIUM PHOSPHATE, DIBASIC AND SODIUM PHOSPHATE, MONOBASIC 7; 19 G/133ML; G/133ML
1 ENEMA RECTAL ONCE AS NEEDED
Status: DISCONTINUED | OUTPATIENT
Start: 2022-01-01 | End: 2022-01-01

## 2022-01-01 RX ORDER — DOXEPIN HYDROCHLORIDE 50 MG/1
1 CAPSULE ORAL DAILY
COMMUNITY

## 2022-01-01 RX ORDER — LEVOTHYROXINE SODIUM 0.05 MG/1
50 TABLET ORAL DAILY
Status: DISCONTINUED | OUTPATIENT
Start: 2022-01-01 | End: 2022-01-01 | Stop reason: SDUPTHER

## 2022-01-01 RX ORDER — SODIUM CHLORIDE 9 MG/ML
INJECTION, SOLUTION INTRAVENOUS CONTINUOUS
Status: ACTIVE | OUTPATIENT
Start: 2022-01-01 | End: 2022-01-01

## 2022-01-01 RX ORDER — ALBUTEROL SULFATE 90 UG/1
2 AEROSOL, METERED RESPIRATORY (INHALATION) EVERY 4 HOURS PRN
COMMUNITY

## 2022-01-01 RX ORDER — IOHEXOL 350 MG/ML
100 INJECTION, SOLUTION INTRAVENOUS
Status: COMPLETED | OUTPATIENT
Start: 2022-01-01 | End: 2022-01-01

## 2022-01-01 RX ORDER — ONDANSETRON 2 MG/ML
4 INJECTION INTRAMUSCULAR; INTRAVENOUS EVERY 6 HOURS PRN
Status: DISCONTINUED | OUTPATIENT
Start: 2022-01-01 | End: 2022-01-01

## 2022-01-01 RX ORDER — METOPROLOL TARTRATE 50 MG/1
50 TABLET, FILM COATED ORAL 2 TIMES DAILY
Status: DISCONTINUED | OUTPATIENT
Start: 2022-01-01 | End: 2022-01-01

## 2022-01-01 RX ORDER — ACETAMINOPHEN 10 MG/ML
1000 INJECTION, SOLUTION INTRAVENOUS EVERY 6 HOURS PRN
Status: DISCONTINUED | OUTPATIENT
Start: 2022-01-01 | End: 2022-01-01

## 2022-01-01 RX ORDER — BISACODYL 10 MG
10 SUPPOSITORY, RECTAL RECTAL
Status: DISCONTINUED | OUTPATIENT
Start: 2022-01-01 | End: 2022-01-01

## 2022-01-01 RX ORDER — METOCLOPRAMIDE HYDROCHLORIDE 5 MG/ML
10 INJECTION INTRAMUSCULAR; INTRAVENOUS EVERY 8 HOURS PRN
Status: DISCONTINUED | OUTPATIENT
Start: 2022-01-01 | End: 2022-01-01

## 2022-01-01 RX ORDER — ACETAMINOPHEN 650 MG/1
650 SUPPOSITORY RECTAL EVERY 4 HOURS PRN
Status: DISCONTINUED | OUTPATIENT
Start: 2022-01-01 | End: 2022-01-01

## 2022-01-01 RX ORDER — LEVOTHYROXINE SODIUM 0.05 MG/1
50 TABLET ORAL
Status: DISCONTINUED | OUTPATIENT
Start: 2022-01-01 | End: 2022-01-01

## 2022-01-01 RX ORDER — FAMOTIDINE 10 MG/ML
20 INJECTION, SOLUTION INTRAVENOUS NIGHTLY
Status: DISCONTINUED | OUTPATIENT
Start: 2022-01-01 | End: 2022-01-01

## 2022-01-01 RX ORDER — SODIUM CHLORIDE 9 MG/ML
INJECTION, SOLUTION INTRAVENOUS CONTINUOUS
Status: CANCELLED | OUTPATIENT
Start: 2022-01-01

## 2022-01-01 RX ORDER — CHLORHEXIDINE GLUCONATE 0.12 MG/ML
15 RINSE ORAL
Status: DISCONTINUED | OUTPATIENT
Start: 2022-01-01 | End: 2022-01-01

## 2022-01-01 RX ORDER — FOLIC ACID 1 MG/1
1 TABLET ORAL DAILY
Status: DISCONTINUED | OUTPATIENT
Start: 2022-01-01 | End: 2022-01-01

## 2022-01-01 RX ORDER — ACETAMINOPHEN 325 MG/1
650 TABLET ORAL EVERY 4 HOURS PRN
Status: DISCONTINUED | OUTPATIENT
Start: 2022-01-01 | End: 2022-01-01

## 2022-01-01 RX ORDER — SENNOSIDES 8.6 MG
17.2 TABLET ORAL NIGHTLY PRN
Status: DISCONTINUED | OUTPATIENT
Start: 2022-01-01 | End: 2022-01-01

## 2022-01-01 RX ORDER — MAGNESIUM OXIDE 400 MG/1
400 TABLET ORAL ONCE
Status: COMPLETED | OUTPATIENT
Start: 2022-01-01 | End: 2022-01-01

## 2022-01-01 RX ORDER — LORAZEPAM 2 MG/ML
1 INJECTION INTRAMUSCULAR EVERY 4 HOURS PRN
Status: DISCONTINUED | OUTPATIENT
Start: 2022-01-01 | End: 2022-01-01

## 2022-01-01 RX ORDER — MULTIPLE VITAMINS W/ MINERALS TAB 9MG-400MCG
1 TAB ORAL DAILY
Status: DISCONTINUED | OUTPATIENT
Start: 2022-01-01 | End: 2022-01-01

## 2022-01-01 RX ORDER — SENNOSIDES 8.8 MG/5ML
10 LIQUID ORAL NIGHTLY PRN
Status: DISCONTINUED | OUTPATIENT
Start: 2022-01-01 | End: 2022-01-01

## 2022-01-01 RX ORDER — GUAIFENESIN 600 MG/1
600 TABLET, EXTENDED RELEASE ORAL 2 TIMES DAILY
Status: DISCONTINUED | OUTPATIENT
Start: 2022-01-01 | End: 2022-01-01

## 2022-01-01 RX ORDER — AMOXICILLIN AND CLAVULANATE POTASSIUM 875; 125 MG/1; MG/1
1 TABLET, FILM COATED ORAL 2 TIMES DAILY
Qty: 10 TABLET | Refills: 0 | Status: SHIPPED | OUTPATIENT
Start: 2022-01-01 | End: 2022-01-01 | Stop reason: CLARIF

## 2022-01-01 RX ORDER — ACETAMINOPHEN 160 MG/5ML
650 SOLUTION ORAL EVERY 4 HOURS PRN
Status: DISCONTINUED | OUTPATIENT
Start: 2022-01-01 | End: 2022-01-01

## 2022-01-01 RX ORDER — ALBUTEROL SULFATE 90 UG/1
2 AEROSOL, METERED RESPIRATORY (INHALATION) EVERY 4 HOURS PRN
Status: DISCONTINUED | OUTPATIENT
Start: 2022-01-01 | End: 2022-01-01

## 2022-01-01 NOTE — CDS QUERY
Pneumonia Specificity  Noah Barclay    Dear Dr. Rashid Ranks:  Clinical information (provided below) indicates pneumonia.  For accurate ICD-10-CM code assignment to reflect severity of illness and risk of mortality,   PLEASE INDICATE T contaminant”         For questions regarding this query, please contact Clinical Documentation :   Christie Domingo  [address], Cell# [phone]                          Thank you!                                                          THI

## 2022-01-04 ENCOUNTER — LAB REQUISITION (OUTPATIENT)
Dept: LAB | Facility: HOSPITAL | Age: 87
End: 2022-01-04
Payer: MEDICARE

## 2022-01-04 DIAGNOSIS — C90.00 MULTIPLE MYELOMA NOT HAVING ACHIEVED REMISSION (HCC): ICD-10-CM

## 2022-01-04 LAB
BASOPHILS # BLD AUTO: 0.06 X10(3) UL (ref 0–0.2)
BASOPHILS NFR BLD AUTO: 0.6 %
EOSINOPHIL # BLD AUTO: 0.14 X10(3) UL (ref 0–0.7)
EOSINOPHIL NFR BLD AUTO: 1.3 %
ERYTHROCYTE [DISTWIDTH] IN BLOOD BY AUTOMATED COUNT: 17.4 %
HCT VFR BLD AUTO: 24.9 %
HGB BLD-MCNC: 8 G/DL
IMM GRANULOCYTES # BLD AUTO: 0.05 X10(3) UL (ref 0–1)
IMM GRANULOCYTES NFR BLD: 0.5 %
LYMPHOCYTES # BLD AUTO: 0.84 X10(3) UL (ref 1–4)
LYMPHOCYTES NFR BLD AUTO: 7.8 %
MCH RBC QN AUTO: 31.9 PG (ref 26–34)
MCHC RBC AUTO-ENTMCNC: 32.1 G/DL (ref 31–37)
MCV RBC AUTO: 99.2 FL
MONOCYTES # BLD AUTO: 0.75 X10(3) UL (ref 0.1–1)
MONOCYTES NFR BLD AUTO: 7 %
NEUTROPHILS # BLD AUTO: 8.93 X10 (3) UL (ref 1.5–7.7)
NEUTROPHILS # BLD AUTO: 8.93 X10(3) UL (ref 1.5–7.7)
NEUTROPHILS NFR BLD AUTO: 82.8 %
PLATELET # BLD AUTO: 505 10(3)UL (ref 150–450)
RBC # BLD AUTO: 2.51 X10(6)UL
WBC # BLD AUTO: 10.8 X10(3) UL (ref 4–11)

## 2022-01-04 PROCEDURE — 85025 COMPLETE CBC W/AUTO DIFF WBC: CPT | Performed by: FAMILY MEDICINE

## 2022-01-05 ENCOUNTER — TELEPHONE (OUTPATIENT)
Dept: HEMATOLOGY/ONCOLOGY | Facility: HOSPITAL | Age: 87
End: 2022-01-05

## 2022-01-05 ENCOUNTER — APPOINTMENT (OUTPATIENT)
Dept: HEMATOLOGY/ONCOLOGY | Facility: HOSPITAL | Age: 87
End: 2022-01-05
Attending: INTERNAL MEDICINE
Payer: MEDICARE

## 2022-01-05 NOTE — TELEPHONE ENCOUNTER
Patient's wife just called to cancel her  appointment with Dr. Maylene Kocher for 300 PM today, 1/5/22 because patient was recently discharged from the Hospital and is still weak and fatigued. He is rescheduled for 1/26/22.

## 2022-01-11 NOTE — DISCHARGE SUMMARY
[facility] Internal Medicine Discharge Summary    Patient ID:  Fany Joe  [id number]  16 year old  3/11/1935    Admit date: 12/18/2021  Discharge date and time: 12/25/21  Attending Physician: Stephanie Bauer MD  Primary Care Physician: Monico Castro distress, alert and oriented  CV: RRR, +s1/s2  Lungs: CTAB, good respiratory effort  Abdomen: s/nt/nd  Ext: Moves all 4 extremities, no c/c/e  Neuro: CN Intact, no focal deficits    Discharge meds     Medication List      CHANGE how you take these medicati INDICATIONS:  hypoxia  PATIENT STATED HISTORY: (As transcribed by Technologist)  Patient offered no additional history at this time. CONCLUSION:  1.  Compared to the previous study, there is decrease in the ground-glass opacities and consolid There is a small right pleural effusion which is slightly larger than on the previous exam.  Heart size is normal.  There is atheromatous plaque identified in the aorta. Stable prominent interstitial markings are seen in the left lung.   There is no pneumo neoplastic disease. 2. Patchy opacity at the right greater than left lung base may represent pneumonia versus asymmetric pulmonary edema.     Dictated by (CST): Melisa Maravilla MD on 12/19/2021 at 6:58 AM     Finalized by (CST): Melisa Maravilla MD on 12/19/20

## 2022-01-18 ENCOUNTER — LAB REQUISITION (OUTPATIENT)
Dept: LAB | Age: 87
End: 2022-01-18
Payer: MEDICARE

## 2022-01-18 DIAGNOSIS — D64.9 ANEMIA, UNSPECIFIED: ICD-10-CM

## 2022-01-18 LAB
BASOPHILS # BLD AUTO: 0.03 X10(3) UL (ref 0–0.2)
BASOPHILS NFR BLD AUTO: 0.2 %
EOSINOPHIL # BLD AUTO: 0.04 X10(3) UL (ref 0–0.7)
EOSINOPHIL NFR BLD AUTO: 0.3 %
ERYTHROCYTE [DISTWIDTH] IN BLOOD BY AUTOMATED COUNT: 18.5 %
HCT VFR BLD AUTO: 21 %
HGB BLD-MCNC: 6.9 G/DL
IMM GRANULOCYTES # BLD AUTO: 0.06 X10(3) UL (ref 0–1)
IMM GRANULOCYTES NFR BLD: 0.5 %
LYMPHOCYTES # BLD AUTO: 0.6 X10(3) UL (ref 1–4)
LYMPHOCYTES NFR BLD AUTO: 4.8 %
MCH RBC QN AUTO: 32.5 PG (ref 26–34)
MCHC RBC AUTO-ENTMCNC: 32.9 G/DL (ref 31–37)
MCV RBC AUTO: 99.1 FL
MONOCYTES # BLD AUTO: 0.85 X10(3) UL (ref 0.1–1)
MONOCYTES NFR BLD AUTO: 6.8 %
NEUTROPHILS # BLD AUTO: 10.88 X10 (3) UL (ref 1.5–7.7)
NEUTROPHILS # BLD AUTO: 10.88 X10(3) UL (ref 1.5–7.7)
NEUTROPHILS NFR BLD AUTO: 87.4 %
PLATELET # BLD AUTO: 341 10(3)UL (ref 150–450)
RBC # BLD AUTO: 2.12 X10(6)UL
WBC # BLD AUTO: 12.5 X10(3) UL (ref 4–11)

## 2022-01-18 PROCEDURE — 85025 COMPLETE CBC W/AUTO DIFF WBC: CPT | Performed by: INTERNAL MEDICINE

## 2022-01-18 NOTE — PROGRESS NOTES
Progressive anemia. Should arrange for 1 unit prbc to be given at [facility] later this week.

## 2022-01-19 ENCOUNTER — APPOINTMENT (OUTPATIENT)
Dept: GENERAL RADIOLOGY | Facility: HOSPITAL | Age: 87
End: 2022-01-19
Attending: EMERGENCY MEDICINE
Payer: MEDICARE

## 2022-01-19 ENCOUNTER — HOSPITAL ENCOUNTER (OUTPATIENT)
Facility: HOSPITAL | Age: 87
Setting detail: OBSERVATION
Discharge: HOME HEALTH | End: 2022-01-21
Attending: EMERGENCY MEDICINE | Admitting: HOSPITALIST
Payer: MEDICARE

## 2022-01-19 DIAGNOSIS — D61.9 ANEMIA DUE TO BONE MARROW FAILURE, UNSPECIFIED BONE MARROW FAILURE TYPE (HCC): Primary | ICD-10-CM

## 2022-01-19 LAB
ALBUMIN SERPL-MCNC: 2 G/DL (ref 3.4–5)
ALBUMIN/GLOB SERPL: 0.4 {RATIO} (ref 1–2)
ALP LIVER SERPL-CCNC: 112 U/L
ALT SERPL-CCNC: 15 U/L
ANION GAP SERPL CALC-SCNC: 7 MMOL/L (ref 0–18)
AST SERPL-CCNC: 9 U/L (ref 15–37)
ATRIAL RATE: 108 BPM
BASOPHILS # BLD AUTO: 0.03 X10(3) UL (ref 0–0.2)
BASOPHILS NFR BLD AUTO: 0.2 %
BILIRUB SERPL-MCNC: 0.8 MG/DL (ref 0.1–2)
BUN BLD-MCNC: 22 MG/DL (ref 7–18)
CALCIUM BLD-MCNC: 8.2 MG/DL (ref 8.5–10.1)
CHLORIDE SERPL-SCNC: 105 MMOL/L (ref 98–112)
CO2 SERPL-SCNC: 25 MMOL/L (ref 21–32)
CREAT BLD-MCNC: 0.74 MG/DL
EOSINOPHIL # BLD AUTO: 0.07 X10(3) UL (ref 0–0.7)
EOSINOPHIL NFR BLD AUTO: 0.6 %
ERYTHROCYTE [DISTWIDTH] IN BLOOD BY AUTOMATED COUNT: 18.3 %
GLOBULIN PLAS-MCNC: 5.3 G/DL (ref 2.8–4.4)
GLUCOSE BLD-MCNC: 119 MG/DL (ref 70–99)
HCT VFR BLD AUTO: 21.9 %
HGB BLD-MCNC: 7 G/DL
HGB BLD-MCNC: 7.1 G/DL
IMM GRANULOCYTES # BLD AUTO: 0.06 X10(3) UL (ref 0–1)
IMM GRANULOCYTES NFR BLD: 0.5 %
LYMPHOCYTES # BLD AUTO: 0.9 X10(3) UL (ref 1–4)
LYMPHOCYTES NFR BLD AUTO: 7.2 %
MCH RBC QN AUTO: 31.5 PG (ref 26–34)
MCHC RBC AUTO-ENTMCNC: 32 G/DL (ref 31–37)
MCV RBC AUTO: 98.6 FL
MONOCYTES # BLD AUTO: 1.02 X10(3) UL (ref 0.1–1)
MONOCYTES NFR BLD AUTO: 8.2 %
NEUTROPHILS # BLD AUTO: 10.34 X10 (3) UL (ref 1.5–7.7)
NEUTROPHILS # BLD AUTO: 10.34 X10(3) UL (ref 1.5–7.7)
NEUTROPHILS NFR BLD AUTO: 83.3 %
OSMOLALITY SERPL CALC.SUM OF ELEC: 288 MOSM/KG (ref 275–295)
PLATELET # BLD AUTO: 409 10(3)UL (ref 150–450)
POTASSIUM SERPL-SCNC: 3.8 MMOL/L (ref 3.5–5.1)
PROT SERPL-MCNC: 7.3 G/DL (ref 6.4–8.2)
Q-T INTERVAL: 358 MS
QRS DURATION: 76 MS
QTC CALCULATION (BEZET): 457 MS
R AXIS: -50 DEGREES
RBC # BLD AUTO: 2.22 X10(6)UL
SARS-COV-2 RNA RESP QL NAA+PROBE: NOT DETECTED
SODIUM SERPL-SCNC: 137 MMOL/L (ref 136–145)
T AXIS: 62 DEGREES
TROPONIN I HIGH SENSITIVITY: 6 NG/L
VENTRICULAR RATE: 98 BPM
WBC # BLD AUTO: 12.4 X10(3) UL (ref 4–11)

## 2022-01-19 PROCEDURE — 30243N1 TRANSFUSION OF NONAUTOLOGOUS RED BLOOD CELLS INTO CENTRAL VEIN, PERCUTANEOUS APPROACH: ICD-10-PCS | Performed by: HOSPITALIST

## 2022-01-19 PROCEDURE — 86920 COMPATIBILITY TEST SPIN: CPT

## 2022-01-19 PROCEDURE — 71045 X-RAY EXAM CHEST 1 VIEW: CPT | Performed by: EMERGENCY MEDICINE

## 2022-01-19 PROCEDURE — 96360 HYDRATION IV INFUSION INIT: CPT

## 2022-01-19 PROCEDURE — 84484 ASSAY OF TROPONIN QUANT: CPT | Performed by: EMERGENCY MEDICINE

## 2022-01-19 PROCEDURE — 86901 BLOOD TYPING SEROLOGIC RH(D): CPT | Performed by: INTERNAL MEDICINE

## 2022-01-19 PROCEDURE — 85018 HEMOGLOBIN: CPT | Performed by: HOSPITALIST

## 2022-01-19 PROCEDURE — 99285 EMERGENCY DEPT VISIT HI MDM: CPT

## 2022-01-19 PROCEDURE — 36430 TRANSFUSION BLD/BLD COMPNT: CPT

## 2022-01-19 PROCEDURE — 86900 BLOOD TYPING SEROLOGIC ABO: CPT | Performed by: INTERNAL MEDICINE

## 2022-01-19 PROCEDURE — 93005 ELECTROCARDIOGRAM TRACING: CPT

## 2022-01-19 PROCEDURE — 82272 OCCULT BLD FECES 1-3 TESTS: CPT

## 2022-01-19 PROCEDURE — 80053 COMPREHEN METABOLIC PANEL: CPT | Performed by: EMERGENCY MEDICINE

## 2022-01-19 PROCEDURE — 93010 ELECTROCARDIOGRAM REPORT: CPT

## 2022-01-19 PROCEDURE — 86850 RBC ANTIBODY SCREEN: CPT | Performed by: INTERNAL MEDICINE

## 2022-01-19 PROCEDURE — 85025 COMPLETE CBC W/AUTO DIFF WBC: CPT | Performed by: EMERGENCY MEDICINE

## 2022-01-19 PROCEDURE — 36415 COLL VENOUS BLD VENIPUNCTURE: CPT | Performed by: INTERNAL MEDICINE

## 2022-01-19 RX ORDER — SODIUM CHLORIDE 9 MG/ML
INJECTION, SOLUTION INTRAVENOUS ONCE
Status: DISCONTINUED | OUTPATIENT
Start: 2022-01-19 | End: 2022-01-21

## 2022-01-19 RX ORDER — FOLIC ACID/VIT B COMPLEX AND C 400 MCG
1 TABLET ORAL DAILY
Status: DISCONTINUED | OUTPATIENT
Start: 2022-01-20 | End: 2022-01-21

## 2022-01-19 RX ORDER — SODIUM CHLORIDE 9 MG/ML
INJECTION, SOLUTION INTRAVENOUS ONCE
Status: COMPLETED | OUTPATIENT
Start: 2022-01-19 | End: 2022-01-19

## 2022-01-19 NOTE — ED INITIAL ASSESSMENT (HPI)
Pt presents to the ER from home via ems with complaints of fatigue and low hemoglobin. Pt has hx of multiple myeloma with frequent blood transfusions.  From notes it appears pt was going to have a blood transfusion this Friday but did not work for family so

## 2022-01-19 NOTE — ED QUICK NOTES
Orders for admission, patient is aware of plan and ready to go upstairs. Any questions, please call ED RN Pranav No at extension [phone].      Patient Covid vaccination status: Fully vaccinated     COVID Test Ordered in ED: Rapid SARS-CoV-2 by [address] [place]

## 2022-01-19 NOTE — ED QUICK NOTES
Labs still pending on pt. Green blue and lavender tubes had been sent by [facility]. Pt had pink top sent by Audi Tinajero, RN. Pt awake and alert, skin w/d,resps appear unlabored.  Pt updated we are awaiting lab results and that if pt is in need of 2 unit

## 2022-01-19 NOTE — ED PROVIDER NOTES
Patient Seen in: [facility] Emergency Department      History   Patient presents with:  Fatigue  Abnormal Result    Stated Complaint: fatigue, abnormal labs, hgb 7    Subjective:   HPI    52-year-old male comes to the hospital complaint of feeling w stated complaint: fatigue, abnormal labs, hgb 7  Other systems are as noted in HPI. Constitutional and vital signs reviewed. All other systems reviewed and negative except as noted above.     Physical Exam     ED Triage Vitals [01/19/22 1340]    WITH CULTURE REFLEX   PREPARE RBC   RAINBOW DRAW LAVENDER   RAINBOW DRAW LIGHT GREEN   RAINBOW DRAW BLUE     EKG    Rate, intervals and axes as noted on EKG Report.   Rate: 98  Rhythm: Atrial Fibrillation  Reading: Agree              XR CHEST PA + LAT CHEST could be best assessed with CT if clinically indicated.     Dictated by (CST): Gabriela Capone MD on 1/19/2022 at 2:43 PM     Finalized by (CST): Gabriela Capone MD on 1/19/2022 at 2:47 PM       XR CHEST AP PORTABLE  (CPT=71045)    Result Date: 12/23/2021  PROCEDURE: 10.0 x 6.4 cm mass projecting in the right paratracheal mediastinum extending into the right hilum and right perihilar upper lung field. There is mild displacement of the distal trachea to the left with mild mass effect upon the trachea.   The mass was pre Disposition:  Admit  1/19/2022  4:05 pm    Follow-up:  No follow-up provider specified.         Medications Prescribed:  Current Discharge Medication List                          Hospital Problems             Present on Admission  Date Reviewed: 12/23/

## 2022-01-19 NOTE — H&P
[facility] Hospitalist H&P       CC: Patient presents with:  Fatigue  Abnormal Result       PCP: Collins Boyce MD    History of Present Illness:    Patient is an 81-year-old male with significant past medical history of multiple myeloma, atrial fibrillation, he mouth., Disp: , Rfl:   Folic Acid 5 MG Oral Cap, Take by mouth daily. , Disp: , Rfl:   PEG 3350-KCl-Na Bicarb-NaCl (TRILYTE) 420 g Oral Recon Soln, Take as directed, split dose, Disp: 1 each, Rfl: 0  B Complex Oral Tab, Take 1 tablet by mouth daily. , Disp: Encounters:  01/19/22 : 128 lb (58.1 kg)  12/19/21 : 124 lb 14.4 oz (56.7 kg)  12/15/21 : 134 lb (60.8 kg)  12/07/21 : 130 lb (59 kg)  11/16/21 : 134 lb (60.8 kg)  11/05/21 : 134 lb (60.8 kg)    Gen: No acute distress, alert and oriented x3  Pulm: Lungs cl INDICATIONS:  fatigue, abnormal labs, hgb 7  PATIENT STATED HISTORY: (As transcribed by Technologist)  Pt. with fatigue and low  hemoglobin. Pt has hx of multiple myeloma with frequent blood  transfusions.     FINDINGS:  There has been interval increase in in SVC without pneumothorax. 5. Dextroscoliosis with moderate multiple level degenerative disc disease.      Dictated by (CST): Hitesh Blair MD on 12/23/2021 at 12:29 PM     Finalized by (CST): Hitesh Blair MD on 12/23/2021 at 12:32 PM       XR be slightly larger than on the previous exam. 3. Diffuse increased interstitial markings in the left lung are stable and could reflect pulmonary fibrosis.      Dictated by (CST): Jerri Schwartz MD on 12/21/2021 at 10:22 PM     Finalized by (CST): Curtis Berry

## 2022-01-20 LAB
ANION GAP SERPL CALC-SCNC: 7 MMOL/L (ref 0–18)
BASOPHILS # BLD AUTO: 0.05 X10(3) UL (ref 0–0.2)
BASOPHILS NFR BLD AUTO: 0.4 %
BUN BLD-MCNC: 23 MG/DL (ref 7–18)
CALCIUM BLD-MCNC: 8 MG/DL (ref 8.5–10.1)
CHLORIDE SERPL-SCNC: 107 MMOL/L (ref 98–112)
CO2 SERPL-SCNC: 24 MMOL/L (ref 21–32)
CREAT BLD-MCNC: 0.74 MG/DL
EOSINOPHIL # BLD AUTO: 0.08 X10(3) UL (ref 0–0.7)
EOSINOPHIL NFR BLD AUTO: 0.6 %
ERYTHROCYTE [DISTWIDTH] IN BLOOD BY AUTOMATED COUNT: 18.7 %
GLUCOSE BLD-MCNC: 104 MG/DL (ref 70–99)
HCT VFR BLD AUTO: 23.7 %
HCT VFR BLD AUTO: 25.1 %
HGB BLD-MCNC: 7.5 G/DL
HGB BLD-MCNC: 7.9 G/DL
HGB BLD-MCNC: 9.3 G/DL
IMM GRANULOCYTES # BLD AUTO: 0.07 X10(3) UL (ref 0–1)
IMM GRANULOCYTES NFR BLD: 0.5 %
LYMPHOCYTES # BLD AUTO: 0.85 X10(3) UL (ref 1–4)
LYMPHOCYTES NFR BLD AUTO: 6 %
MCH RBC QN AUTO: 30.7 PG (ref 26–34)
MCHC RBC AUTO-ENTMCNC: 31.5 G/DL (ref 31–37)
MCV RBC AUTO: 97.7 FL
MONOCYTES # BLD AUTO: 1.06 X10(3) UL (ref 0.1–1)
MONOCYTES NFR BLD AUTO: 7.5 %
NEUTROPHILS # BLD AUTO: 12.06 X10 (3) UL (ref 1.5–7.7)
NEUTROPHILS # BLD AUTO: 12.06 X10(3) UL (ref 1.5–7.7)
NEUTROPHILS NFR BLD AUTO: 85 %
OSMOLALITY SERPL CALC.SUM OF ELEC: 290 MOSM/KG (ref 275–295)
PLATELET # BLD AUTO: 371 10(3)UL (ref 150–450)
POTASSIUM SERPL-SCNC: 4 MMOL/L (ref 3.5–5.1)
RBC # BLD AUTO: 2.57 X10(6)UL
SODIUM SERPL-SCNC: 138 MMOL/L (ref 136–145)
WBC # BLD AUTO: 14.2 X10(3) UL (ref 4–11)

## 2022-01-20 PROCEDURE — 36430 TRANSFUSION BLD/BLD COMPNT: CPT

## 2022-01-20 PROCEDURE — 85025 COMPLETE CBC W/AUTO DIFF WBC: CPT | Performed by: HOSPITALIST

## 2022-01-20 PROCEDURE — 30243N1 TRANSFUSION OF NONAUTOLOGOUS RED BLOOD CELLS INTO CENTRAL VEIN, PERCUTANEOUS APPROACH: ICD-10-PCS | Performed by: HOSPITALIST

## 2022-01-20 PROCEDURE — 85018 HEMOGLOBIN: CPT | Performed by: HOSPITALIST

## 2022-01-20 PROCEDURE — 85014 HEMATOCRIT: CPT | Performed by: HOSPITALIST

## 2022-01-20 PROCEDURE — 80048 BASIC METABOLIC PNL TOTAL CA: CPT | Performed by: HOSPITALIST

## 2022-01-20 RX ORDER — METOCLOPRAMIDE HYDROCHLORIDE 5 MG/ML
10 INJECTION INTRAMUSCULAR; INTRAVENOUS EVERY 8 HOURS PRN
Status: DISCONTINUED | OUTPATIENT
Start: 2022-01-20 | End: 2022-01-21

## 2022-01-20 RX ORDER — BISACODYL 10 MG
10 SUPPOSITORY, RECTAL RECTAL
Status: DISCONTINUED | OUTPATIENT
Start: 2022-01-20 | End: 2022-01-21

## 2022-01-20 RX ORDER — MELATONIN
3 NIGHTLY PRN
Status: DISCONTINUED | OUTPATIENT
Start: 2022-01-20 | End: 2022-01-21

## 2022-01-20 RX ORDER — SENNOSIDES 8.6 MG
17.2 TABLET ORAL NIGHTLY PRN
Status: DISCONTINUED | OUTPATIENT
Start: 2022-01-20 | End: 2022-01-21

## 2022-01-20 RX ORDER — SODIUM CHLORIDE 9 MG/ML
INJECTION, SOLUTION INTRAVENOUS ONCE
Status: COMPLETED | OUTPATIENT
Start: 2022-01-20 | End: 2022-01-20

## 2022-01-20 RX ORDER — POLYETHYLENE GLYCOL 3350 17 G/17G
17 POWDER, FOR SOLUTION ORAL DAILY PRN
Status: DISCONTINUED | OUTPATIENT
Start: 2022-01-20 | End: 2022-01-21

## 2022-01-20 RX ORDER — ONDANSETRON 2 MG/ML
4 INJECTION INTRAMUSCULAR; INTRAVENOUS EVERY 6 HOURS PRN
Status: DISCONTINUED | OUTPATIENT
Start: 2022-01-20 | End: 2022-01-21

## 2022-01-20 RX ORDER — ACETAMINOPHEN 325 MG/1
650 TABLET ORAL EVERY 6 HOURS PRN
Status: DISCONTINUED | OUTPATIENT
Start: 2022-01-20 | End: 2022-01-21

## 2022-01-20 NOTE — PLAN OF CARE
A/Ox4. Pleasant and cooperative. On RA. Tele-Afib (chronic). S/p 1 unit of RBCs overnight. Hgb now 7.9  Heme/onc following. Possibility of transfusing another unit. Hospitalist discussing with heme/onc. Denies any pain or needs at this time.   Staff will blowing  Outcome: Progressing

## 2022-01-20 NOTE — CM/SW NOTE
MSW called pt's dtr Winifred Blake - [phone]    Dtr states plan is for pt to return home and continue with [facility]. Family has lots of support in place, has adjustable bed; w/c; walker.     SONJA order in

## 2022-01-20 NOTE — CONSULTS
[facility]  [facility] HEMAT[facility] ONCOLOGY  Report of Consultation    Marie [place] Patient Status:  Observation    3/11/1935 MRN [id number]   [facility] 3NE-A Attending Kati Pierre, [address] [place] [facility] Day # 0 PCP Brian Irving Disorder Sister        SOCIAL HX   reports that he has quit smoking. He has never used smokeless tobacco. He reports previous alcohol use. He reports that he does not use drugs.     Allergies:  No Known Allergies    Medications:    Current Facility-Administ Creatinine 0.74 0.70 - 1.30 mg/dL    Calcium, Total 8.2 (L) 8.5 - 10.1 mg/dL    Calculated Osmolality 288 275 - 295 mOsm/kg    GFR, Non- 84 >=60    GFR, -American 97 >=60    AST 9 (L) 15 - 37 U/L    ALT 15 (L) 16 - 61 U/L    Alkaline Result Value Ref Range    Blood Product [id number]     Unit Number [id number]     UNIT ABO O     UNIT RH NEG     Crossmatch Compatible     Product Status Cross Matched     Expiration Date [id number]     Blood Type Barcode 3114    Prepare RBC Once %    Immature Granulocyte % 0.5 %   Hemoglobin & Hematocrit    Collection Time: 01/20/22 12:27 PM   Result Value Ref Range    HGB 7.5 (L) 13.0 - 17.5 g/dL    HCT 23.7 (L) 39.0 - 53.0 %       Recent Labs   Lab 01/18/22  1330 01/18/22  1330 01/19/22  1511  Atrial fibrillation with RVR (HCC)     Sepsis due to undetermined organism (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

## 2022-01-20 NOTE — PROGRESS NOTES
Patient alert and oriented x3. On ra and vitals stable. Completed first unit of blood. Matt Lopez from blood bank called to verify order for 2nd unit as MD ordered for washed blood and patient does not meet criteria for.  Dr Kamille Kennedy on call notified and per md, to

## 2022-01-20 NOTE — PROGRESS NOTES
NURSING ADMISSION NOTE      Patient admitted via Cart  Oriented to room. Safety precautions initiated. Bed in low position. Call light in reach. Navigator completed. Pt. denies pain or discomfort. Blood running. Sepsis alert fired in [facility].  [facility]

## 2022-01-21 ENCOUNTER — APPOINTMENT (OUTPATIENT)
Dept: HEMATOLOGY/ONCOLOGY | Facility: HOSPITAL | Age: 87
End: 2022-01-21
Attending: INTERNAL MEDICINE
Payer: MEDICARE

## 2022-01-21 VITALS
BODY MASS INDEX: 17.34 KG/M2 | WEIGHT: 128 LBS | SYSTOLIC BLOOD PRESSURE: 139 MMHG | OXYGEN SATURATION: 96 % | RESPIRATION RATE: 18 BRPM | HEIGHT: 72 IN | DIASTOLIC BLOOD PRESSURE: 76 MMHG | TEMPERATURE: 97 F | HEART RATE: 104 BPM

## 2022-01-21 LAB
BLOOD TYPE BARCODE: 9500
HGB BLD-MCNC: 8 G/DL

## 2022-01-21 PROCEDURE — 85018 HEMOGLOBIN: CPT | Performed by: INTERNAL MEDICINE

## 2022-01-21 NOTE — PROGRESS NOTES
[facility] Hospitalist Progress Note     [facility]      SUBJECTIVE:  Doing well, no sx and wants anoter unit, multiple conversations with daughter about transfusions    OBJECTIVE:  Temp:  [97.4 °F (36.3 °C)-98.1 °F (36.7 °C)] 97.4 °F (36.3 °C)  Pulse:  [9 and measured on prior chest CT. Mild opacities in the lower lungs are unchanged. There is a small-to-moderate right pleural effusion, grossly stable.      IMPRESSION: Grossly stable appearance of the chest, with right upper lobe mass, mild bilateral lower l CONCLUSION:  1. Compared to the previous study, there is decrease in the ground-glass opacities and consolidation in the right perihilar region and mid aspect of the right lungs/lung base.   There is however , increase in the size of the right pleural e consulted  -Jamaica Gomez history of multiple transfusions we will get leuk reduced and washed RBCs  -Transfuse through port, post transfusion hgb at 7.9-->7.5 getting one unit - post transfusion cbc after second unit still pending , if > 7.9 can dc home

## 2022-01-21 NOTE — DISCHARGE SUMMARY
General Medicine Discharge Summary     Patient ID:  Natividad Figueroa  81year old  3/11/1935    Admit date: 1/19/2022    Discharge date and time:1/21/2022    Attending Physician: Marie Casarez MD     Primary Care Physician: MD Che Garces up versus palliative care        #A.  Fib  -Patient on anticoagulation resume at this time     #History of hematochromatosis  #History of squamous cell carcinoma    Consults: IP CONSULT TO HOSPITALIST  IP CONSULT TO ONCOLOGY  IP CONSULT TO SOCIAL WORK    Op

## 2022-01-21 NOTE — PLAN OF CARE
Assumed patient care from 1/20 @1500 until 1/21 @0700  Patient is AOx3, disoriented to time  On room air, Afib (controlled-90's) on tele-had 2 episodes of 3 beats of vtach per tele tech-on eliquis monitor-asymptomatic  Patient incontinent of bowel and blad appropriate  - Administer supportive blood products/factors as ordered and appropriate  1/21/2022 0118 by Masha Rebolledo, RN  Outcome: Progressing  1/21/2022 0116 by Masha Rebolledo, RN  Outcome: Progressing  Goal: Free from bleeding injury  Description: 0116 by Zay Carter, RN  Outcome: Progressing     Problem: DISCHARGE PLANNING  Goal: Discharge to home or other facility with appropriate resources  Description: INTERVENTIONS:  - Identify barriers to discharge w/pt and caregiver  - Include patient/fam

## 2022-01-21 NOTE — PLAN OF CARE
Problem: METABOLIC/FLUID AND ELECTROLYTES - ADULT  Goal: Hemodynamic stability and optimal renal function maintained  Description: INTERVENTIONS:  - Monitor labs and assess for signs and symptoms of volume excess or deficit  - Monitor intake, output and fall injury  Description: INTERVENTIONS:  - Assess pt frequently for physical needs  - Identify cognitive and physical deficits and behaviors that affect risk of falls.   - [place] fall precautions as indicated by assessment.  - Educate pt/family on patie

## 2022-01-21 NOTE — PROGRESS NOTES
[facility]  [facility] HEMAT[facility]Y ONCOLOGY  Progress Note    Shawnee Rausch Patient Status:  Observation    3/11/1935 MRN [id number]   [facility] 3NE-A Attending Yanick Casanova MD   Hosp Day # 0 PCP Collins Boyce MD     ADM CULTURES  No results found for this visit on 01/19/22. IMAGING:    No results found. MEDICATIONS:  Medications reviewed.     • apixaban  2.5 mg Oral BID   • Marina-Bee/C  1 tablet Oral Daily   • metoprolol tartrate  25 mg Oral BID   • sodium chl

## 2022-01-21 NOTE — PROGRESS NOTES
NURSING DISCHARGE NOTE    Discharged Home via Wheelchair. Accompanied by Support staff  Belongings Taken by patient/family. Pt discharged home. Hgb this AM is 8.0. Pt in stable condition. VSS on RA. Discharge paperwork reviewed in detail.  IV and te

## 2022-01-21 NOTE — DISCHARGE SUMMARY
General Medicine Discharge Summary     Patient ID:  Millie Quispe  81year old  3/11/1935    Admit date: 1/19/2022    Discharge date and time:1/20/2022    Attending Physician: Rosita Dupont MD     Primary Care Physician: MD Jamaal Murphy squamous cell carcinoma    Consults: IP CONSULT TO HOSPITALIST  IP CONSULT TO ONCOLOGY  IP CONSULT TO SOCIAL WORK    Operative Procedures:      Disposition: home    Patient Instructions:   Current Discharge Medication List    CONTINUE these medications whi

## 2022-01-24 ENCOUNTER — LAB REQUISITION (OUTPATIENT)
Dept: LAB | Facility: HOSPITAL | Age: 87
End: 2022-01-24
Payer: MEDICARE

## 2022-01-24 DIAGNOSIS — R69 ILLNESS, UNSPECIFIED: ICD-10-CM

## 2022-01-24 LAB
BASOPHILS # BLD AUTO: 0.05 X10(3) UL (ref 0–0.2)
BASOPHILS NFR BLD AUTO: 0.5 %
EOSINOPHIL # BLD AUTO: 0.18 X10(3) UL (ref 0–0.7)
EOSINOPHIL NFR BLD AUTO: 1.7 %
ERYTHROCYTE [DISTWIDTH] IN BLOOD BY AUTOMATED COUNT: 17.3 %
HCT VFR BLD AUTO: 27.2 %
HGB BLD-MCNC: 8.3 G/DL
IMM GRANULOCYTES # BLD AUTO: 0.06 X10(3) UL (ref 0–1)
IMM GRANULOCYTES NFR BLD: 0.6 %
LYMPHOCYTES # BLD AUTO: 0.55 X10(3) UL (ref 1–4)
LYMPHOCYTES NFR BLD AUTO: 5.1 %
MCH RBC QN AUTO: 30.4 PG (ref 26–34)
MCHC RBC AUTO-ENTMCNC: 30.5 G/DL (ref 31–37)
MCV RBC AUTO: 99.6 FL
MONOCYTES # BLD AUTO: 0.72 X10(3) UL (ref 0.1–1)
MONOCYTES NFR BLD AUTO: 6.6 %
NEUTROPHILS # BLD AUTO: 9.28 X10 (3) UL (ref 1.5–7.7)
NEUTROPHILS # BLD AUTO: 9.28 X10(3) UL (ref 1.5–7.7)
NEUTROPHILS NFR BLD AUTO: 85.5 %
PLATELET # BLD AUTO: 412 10(3)UL (ref 150–450)
RBC # BLD AUTO: 2.73 X10(6)UL
WBC # BLD AUTO: 10.8 X10(3) UL (ref 4–11)

## 2022-01-24 PROCEDURE — 85025 COMPLETE CBC W/AUTO DIFF WBC: CPT | Performed by: INTERNAL MEDICINE

## 2022-01-25 NOTE — PROGRESS NOTES
Attempted to reach pt, no answer. NO message was left due to no pt identifiers. Will try again soon.

## 2022-01-26 ENCOUNTER — APPOINTMENT (OUTPATIENT)
Dept: HEMATOLOGY/ONCOLOGY | Facility: HOSPITAL | Age: 87
End: 2022-01-26
Attending: INTERNAL MEDICINE
Payer: MEDICARE

## 2022-01-31 ENCOUNTER — LAB REQUISITION (OUTPATIENT)
Dept: LAB | Facility: HOSPITAL | Age: 87
End: 2022-01-31
Payer: MEDICARE

## 2022-01-31 LAB
BASOPHILS # BLD AUTO: 0.03 X10(3) UL (ref 0–0.2)
BASOPHILS NFR BLD AUTO: 0.2 %
EOSINOPHIL # BLD AUTO: 0.06 X10(3) UL (ref 0–0.7)
EOSINOPHIL NFR BLD AUTO: 0.4 %
ERYTHROCYTE [DISTWIDTH] IN BLOOD BY AUTOMATED COUNT: 17.1 %
HCT VFR BLD AUTO: 22.4 %
HGB BLD-MCNC: 7.4 G/DL
IMM GRANULOCYTES # BLD AUTO: 0.08 X10(3) UL (ref 0–1)
IMM GRANULOCYTES NFR BLD: 0.6 %
LYMPHOCYTES # BLD AUTO: 0.62 X10(3) UL (ref 1–4)
LYMPHOCYTES NFR BLD AUTO: 4.3 %
MCH RBC QN AUTO: 31.2 PG (ref 26–34)
MCHC RBC AUTO-ENTMCNC: 33 G/DL (ref 31–37)
MCV RBC AUTO: 94.5 FL
MONOCYTES # BLD AUTO: 1.07 X10(3) UL (ref 0.1–1)
MONOCYTES NFR BLD AUTO: 7.4 %
NEUTROPHILS # BLD AUTO: 12.54 X10 (3) UL (ref 1.5–7.7)
NEUTROPHILS # BLD AUTO: 12.54 X10(3) UL (ref 1.5–7.7)
NEUTROPHILS NFR BLD AUTO: 87.1 %
PLATELET # BLD AUTO: 448 10(3)UL (ref 150–450)
RBC # BLD AUTO: 2.37 X10(6)UL
WBC # BLD AUTO: 14.4 X10(3) UL (ref 4–11)

## 2022-01-31 PROCEDURE — 85025 COMPLETE CBC W/AUTO DIFF WBC: CPT | Performed by: INTERNAL MEDICINE

## 2022-02-03 PROBLEM — E03.9 HYPOTHYROIDISM, UNSPECIFIED TYPE: Status: ACTIVE | Noted: 2022-02-03

## 2022-02-03 PROBLEM — E03.9 HYPOTHYROIDISM, UNSPECIFIED TYPE: Status: ACTIVE | Noted: 2022-01-01

## 2022-02-05 ENCOUNTER — APPOINTMENT (OUTPATIENT)
Dept: GENERAL RADIOLOGY | Facility: HOSPITAL | Age: 87
DRG: 871 | End: 2022-02-05
Attending: EMERGENCY MEDICINE
Payer: MEDICARE

## 2022-02-05 ENCOUNTER — HOSPITAL ENCOUNTER (INPATIENT)
Facility: HOSPITAL | Age: 87
LOS: 5 days | Discharge: HOME HEALTH | DRG: 871 | End: 2022-02-10
Attending: EMERGENCY MEDICINE | Admitting: INTERNAL MEDICINE
Payer: MEDICARE

## 2022-02-05 DIAGNOSIS — J18.9 COMMUNITY ACQUIRED PNEUMONIA OF RIGHT UPPER LOBE OF LUNG: ICD-10-CM

## 2022-02-05 DIAGNOSIS — D64.9 ANEMIA, UNSPECIFIED TYPE: Primary | ICD-10-CM

## 2022-02-05 LAB
ALBUMIN SERPL-MCNC: 1.9 G/DL (ref 3.4–5)
ALBUMIN/GLOB SERPL: 0.4 {RATIO} (ref 1–2)
ALP LIVER SERPL-CCNC: 108 U/L
ALT SERPL-CCNC: 17 U/L
ANION GAP SERPL CALC-SCNC: 6 MMOL/L (ref 0–18)
ANTIBODY SCREEN: NEGATIVE
AST SERPL-CCNC: 9 U/L (ref 15–37)
BASOPHILS # BLD AUTO: 0.02 X10(3) UL (ref 0–0.2)
BASOPHILS NFR BLD AUTO: 0.1 %
BILIRUB SERPL-MCNC: 1.2 MG/DL (ref 0.1–2)
BUN BLD-MCNC: 19 MG/DL (ref 7–18)
CALCIUM BLD-MCNC: 8.2 MG/DL (ref 8.5–10.1)
CHLORIDE SERPL-SCNC: 101 MMOL/L (ref 98–112)
CO2 SERPL-SCNC: 24 MMOL/L (ref 21–32)
CREAT BLD-MCNC: 0.81 MG/DL
EOSINOPHIL # BLD AUTO: 0 X10(3) UL (ref 0–0.7)
EOSINOPHIL NFR BLD AUTO: 0 %
ERYTHROCYTE [DISTWIDTH] IN BLOOD BY AUTOMATED COUNT: 17.6 %
GLOBULIN PLAS-MCNC: 5 G/DL (ref 2.8–4.4)
GLUCOSE BLD-MCNC: 152 MG/DL (ref 70–99)
HCT VFR BLD AUTO: 21 %
HGB BLD-MCNC: 6.8 G/DL
IMM GRANULOCYTES # BLD AUTO: 0.11 X10(3) UL (ref 0–1)
IMM GRANULOCYTES NFR BLD: 0.5 %
LYMPHOCYTES # BLD AUTO: 0.52 X10(3) UL (ref 1–4)
LYMPHOCYTES NFR BLD AUTO: 2.5 %
MCHC RBC AUTO-ENTMCNC: 32.4 G/DL (ref 31–37)
MCV RBC AUTO: 95.5 FL
MONOCYTES # BLD AUTO: 0.69 X10(3) UL (ref 0.1–1)
MONOCYTES NFR BLD AUTO: 3.3 %
NEUTROPHILS # BLD AUTO: 19.53 X10 (3) UL (ref 1.5–7.7)
NEUTROPHILS # BLD AUTO: 19.53 X10(3) UL (ref 1.5–7.7)
NEUTROPHILS NFR BLD AUTO: 93.6 %
NT-PROBNP SERPL-MCNC: 3335 PG/ML (ref ?–450)
OSMOLALITY SERPL CALC.SUM OF ELEC: 277 MOSM/KG (ref 275–295)
PLATELET # BLD AUTO: 404 10(3)UL (ref 150–450)
POTASSIUM SERPL-SCNC: 4.1 MMOL/L (ref 3.5–5.1)
PROT SERPL-MCNC: 6.9 G/DL (ref 6.4–8.2)
RBC # BLD AUTO: 2.2 X10(6)UL
RH BLOOD TYPE: NEGATIVE
SARS-COV-2 RNA RESP QL NAA+PROBE: NOT DETECTED
SODIUM SERPL-SCNC: 131 MMOL/L (ref 136–145)
TROPONIN I HIGH SENSITIVITY: 9 NG/L
WBC # BLD AUTO: 20.9 X10(3) UL (ref 4–11)

## 2022-02-05 PROCEDURE — 36415 COLL VENOUS BLD VENIPUNCTURE: CPT

## 2022-02-05 PROCEDURE — 86900 BLOOD TYPING SEROLOGIC ABO: CPT | Performed by: EMERGENCY MEDICINE

## 2022-02-05 PROCEDURE — 86920 COMPATIBILITY TEST SPIN: CPT

## 2022-02-05 PROCEDURE — 99285 EMERGENCY DEPT VISIT HI MDM: CPT

## 2022-02-05 PROCEDURE — 96365 THER/PROPH/DIAG IV INF INIT: CPT

## 2022-02-05 PROCEDURE — 80053 COMPREHEN METABOLIC PANEL: CPT | Performed by: EMERGENCY MEDICINE

## 2022-02-05 PROCEDURE — 30233N1 TRANSFUSION OF NONAUTOLOGOUS RED BLOOD CELLS INTO PERIPHERAL VEIN, PERCUTANEOUS APPROACH: ICD-10-PCS | Performed by: HOSPITALIST

## 2022-02-05 PROCEDURE — 93005 ELECTROCARDIOGRAM TRACING: CPT

## 2022-02-05 PROCEDURE — 86901 BLOOD TYPING SEROLOGIC RH(D): CPT | Performed by: EMERGENCY MEDICINE

## 2022-02-05 PROCEDURE — 84484 ASSAY OF TROPONIN QUANT: CPT | Performed by: EMERGENCY MEDICINE

## 2022-02-05 PROCEDURE — 93010 ELECTROCARDIOGRAM REPORT: CPT

## 2022-02-05 PROCEDURE — 30233H1 TRANSFUSION OF NONAUTOLOGOUS WHOLE BLOOD INTO PERIPHERAL VEIN, PERCUTANEOUS APPROACH: ICD-10-PCS | Performed by: HOSPITALIST

## 2022-02-05 PROCEDURE — 87040 BLOOD CULTURE FOR BACTERIA: CPT | Performed by: EMERGENCY MEDICINE

## 2022-02-05 PROCEDURE — 85025 COMPLETE CBC W/AUTO DIFF WBC: CPT | Performed by: EMERGENCY MEDICINE

## 2022-02-05 PROCEDURE — 36430 TRANSFUSION BLD/BLD COMPNT: CPT

## 2022-02-05 PROCEDURE — 86850 RBC ANTIBODY SCREEN: CPT | Performed by: EMERGENCY MEDICINE

## 2022-02-05 PROCEDURE — 71045 X-RAY EXAM CHEST 1 VIEW: CPT | Performed by: EMERGENCY MEDICINE

## 2022-02-05 PROCEDURE — 96367 TX/PROPH/DG ADDL SEQ IV INF: CPT

## 2022-02-05 PROCEDURE — 83880 ASSAY OF NATRIURETIC PEPTIDE: CPT | Performed by: EMERGENCY MEDICINE

## 2022-02-05 RX ORDER — ONDANSETRON 2 MG/ML
4 INJECTION INTRAMUSCULAR; INTRAVENOUS EVERY 6 HOURS PRN
Status: DISCONTINUED | OUTPATIENT
Start: 2022-02-05 | End: 2022-02-10

## 2022-02-05 RX ORDER — ACETAMINOPHEN 325 MG/1
650 TABLET ORAL EVERY 6 HOURS PRN
Status: DISCONTINUED | OUTPATIENT
Start: 2022-02-05 | End: 2022-02-10

## 2022-02-05 RX ORDER — LEVOTHYROXINE SODIUM 0.05 MG/1
50 TABLET ORAL
Status: DISCONTINUED | OUTPATIENT
Start: 2022-02-06 | End: 2022-02-10

## 2022-02-05 RX ORDER — HEPARIN SODIUM 5000 [USP'U]/ML
5000 INJECTION, SOLUTION INTRAVENOUS; SUBCUTANEOUS EVERY 8 HOURS SCHEDULED
Status: CANCELLED | OUTPATIENT
Start: 2022-02-05

## 2022-02-05 RX ORDER — VANCOMYCIN HYDROCHLORIDE
25 ONCE
Status: COMPLETED | OUTPATIENT
Start: 2022-02-05 | End: 2022-02-05

## 2022-02-05 RX ORDER — FOLIC ACID 1 MG/1
1 TABLET ORAL DAILY
Status: DISCONTINUED | OUTPATIENT
Start: 2022-02-06 | End: 2022-02-10

## 2022-02-05 RX ORDER — METOCLOPRAMIDE HYDROCHLORIDE 5 MG/ML
10 INJECTION INTRAMUSCULAR; INTRAVENOUS EVERY 8 HOURS PRN
Status: DISCONTINUED | OUTPATIENT
Start: 2022-02-05 | End: 2022-02-10

## 2022-02-05 NOTE — ED INITIAL ASSESSMENT (HPI)
Pt arrived to ED via EMS with c/o SOB. Pt sts his cough has been rough sounding and has been experiencing difficulty breathing as of lately.

## 2022-02-06 LAB
ANION GAP SERPL CALC-SCNC: 7 MMOL/L (ref 0–18)
ATRIAL RATE: 110 BPM
BASOPHILS # BLD AUTO: 0.03 X10(3) UL (ref 0–0.2)
BASOPHILS NFR BLD AUTO: 0.2 %
BUN BLD-MCNC: 16 MG/DL (ref 7–18)
CALCIUM BLD-MCNC: 7.8 MG/DL (ref 8.5–10.1)
CO2 SERPL-SCNC: 25 MMOL/L (ref 21–32)
CREAT BLD-MCNC: 0.69 MG/DL
EOSINOPHIL # BLD AUTO: 0.05 X10(3) UL (ref 0–0.7)
EOSINOPHIL NFR BLD AUTO: 0.4 %
ERYTHROCYTE [DISTWIDTH] IN BLOOD BY AUTOMATED COUNT: 16.2 %
GLUCOSE BLD-MCNC: 97 MG/DL (ref 70–99)
HGB BLD-MCNC: 8.3 G/DL
IMM GRANULOCYTES # BLD AUTO: 0.08 X10(3) UL (ref 0–1)
IMM GRANULOCYTES NFR BLD: 0.6 %
L PNEUMO AG UR QL: NEGATIVE
LYMPHOCYTES # BLD AUTO: 0.6 X10(3) UL (ref 1–4)
LYMPHOCYTES NFR BLD AUTO: 4.8 %
MCH RBC QN AUTO: 30.5 PG (ref 26–34)
MCHC RBC AUTO-ENTMCNC: 32.3 G/DL (ref 31–37)
MCV RBC AUTO: 94.5 FL
MONOCYTES # BLD AUTO: 0.88 X10(3) UL (ref 0.1–1)
MONOCYTES NFR BLD AUTO: 7 %
MRSA DNA SPEC QL NAA+PROBE: NEGATIVE
NEUTROPHILS # BLD AUTO: 10.94 X10 (3) UL (ref 1.5–7.7)
NEUTROPHILS # BLD AUTO: 10.94 X10(3) UL (ref 1.5–7.7)
NEUTROPHILS NFR BLD AUTO: 87 %
PLATELET # BLD AUTO: 344 10(3)UL (ref 150–450)
POTASSIUM SERPL-SCNC: 3.9 MMOL/L (ref 3.5–5.1)
Q-T INTERVAL: 332 MS
QRS DURATION: 70 MS
QTC CALCULATION (BEZET): 451 MS
R AXIS: -45 DEGREES
RBC # BLD AUTO: 2.72 X10(6)UL
SODIUM SERPL-SCNC: 136 MMOL/L (ref 136–145)
STREP PNEUMO ANTIGEN, URINE: NEGATIVE
T AXIS: 64 DEGREES
VENTRICULAR RATE: 111 BPM
WBC # BLD AUTO: 12.6 X10(3) UL (ref 4–11)

## 2022-02-06 PROCEDURE — 80048 BASIC METABOLIC PNL TOTAL CA: CPT | Performed by: INTERNAL MEDICINE

## 2022-02-06 PROCEDURE — 85025 COMPLETE CBC W/AUTO DIFF WBC: CPT | Performed by: INTERNAL MEDICINE

## 2022-02-06 PROCEDURE — 87070 CULTURE OTHR SPECIMN AEROBIC: CPT | Performed by: INTERNAL MEDICINE

## 2022-02-06 PROCEDURE — 87449 NOS EACH ORGANISM AG IA: CPT | Performed by: INTERNAL MEDICINE

## 2022-02-06 PROCEDURE — 87641 MR-STAPH DNA AMP PROBE: CPT | Performed by: INTERNAL MEDICINE

## 2022-02-06 PROCEDURE — 87205 SMEAR GRAM STAIN: CPT | Performed by: INTERNAL MEDICINE

## 2022-02-06 PROCEDURE — 94667 MNPJ CHEST WALL 1ST: CPT

## 2022-02-06 NOTE — PROGRESS NOTES
[facility] Dosing Service    Initial Pharmacokinetic Consult for Vancomycin AUC Dosing    Eduar Peraza is a 81year old patient who is being treated for cellulitis. Pharmacy has been asked to dose vancomycin by Dr Puneet Valencia. Weights:  Ideal body weight: 77.6 kg (171 lb 1.2 oz)  Actual weight:  55.8 kg (123 lb)    Labs:  Lab Results   Component Value Date    CREATSERUM 0.81 02/05/2022      CrCl:  Estimated Creatinine Clearance: 52.6 mL/min (based on SCr of 0.81 mg/dL). Based on the above:    1. This patient will receive a loading dose of Vancomycin  1500 mg IVPB (25mg/kg, capped at 2000 mg) x 1 dose. This will be followed by 750 mg Q 24 hours based upon actual body weight of 52.6 kg and renal function. 2. Vancomycin peak and trough will be obtained at steady state in order to calculate AUC24. Goal AUC24 is 400-600 mg-h/L.    3. Pharmacy will order SCr as clinically indicated while on vancomycin to assess renal function. 4. Pharmacy will follow and monitor renal function, toxicity and efficacy. We appreciate the opportunity to assist in the care of this patient.     [address] [place] [address]  2/5/2022  10:07 PM  [address] [place] [address] Extension: [phone]

## 2022-02-06 NOTE — ED QUICK NOTES
Orders for admission, patient is aware of plan and ready to go upstairs. Any questions, please call ED RN Maria Luz Lawrence at extension [phone] . Vaccinated?  Yes  Type of COVID test sent: Rapid SARS  COVID Suspicion level: Low        Titratable drug(s) infusing:  Rate:     LOC at time of transport: Alert     Other pertinent information:     CIWA score=  NIH score=

## 2022-02-06 NOTE — PLAN OF CARE
NURSING ADMISSION NOTE      Patient admitted via Cart  Oriented to room. Safety precautions initiated. Bed in low position. Call light in reach. Received pt from ER with admitting orders, pt alert and oriented x4, vitals stable, afebrile, denies pain. Receiving IVabx, no reaction noted. hgb 6.8, received 1 unit of PRBC, tolerated well. pulm on consult [facility] notified. Lung sounds rhonchi. Voiding, no bm tonight. Will continue to  Monitor.

## 2022-02-06 NOTE — PLAN OF CARE
Pt received A&Ox4. Afebrile. VSS. Seen by pulm today. Satting in mid to upper 90s on RA. Productive cough. Sputum sample obtained. Pt requesting lozenges for hoarse voice. PRN Cepacol provided. IV zosyn and zithromax infusing per MAR. Repeat hgb today 8.3. Pt's son updated on phone regarding POC. Pt's wife designated care partner at the bedside. Fall precautions in place. Call light in reach. Will continue to monitor.

## 2022-02-07 LAB
ANION GAP SERPL CALC-SCNC: 8 MMOL/L (ref 0–18)
BASOPHILS # BLD AUTO: 0.02 X10(3) UL (ref 0–0.2)
BASOPHILS NFR BLD AUTO: 0.2 %
BLOOD TYPE BARCODE: 9500
BUN BLD-MCNC: 13 MG/DL (ref 7–18)
CALCIUM BLD-MCNC: 7.8 MG/DL (ref 8.5–10.1)
CHLORIDE SERPL-SCNC: 106 MMOL/L (ref 98–112)
CO2 SERPL-SCNC: 24 MMOL/L (ref 21–32)
CREAT BLD-MCNC: 0.63 MG/DL
EOSINOPHIL # BLD AUTO: 0.15 X10(3) UL (ref 0–0.7)
EOSINOPHIL NFR BLD AUTO: 1.3 %
ERYTHROCYTE [DISTWIDTH] IN BLOOD BY AUTOMATED COUNT: 16.6 %
GLUCOSE BLD-MCNC: 95 MG/DL (ref 70–99)
HCT VFR BLD AUTO: 24.1 %
IMM GRANULOCYTES # BLD AUTO: 0.06 X10(3) UL (ref 0–1)
IMM GRANULOCYTES NFR BLD: 0.5 %
LYMPHOCYTES NFR BLD AUTO: 4.7 %
MCH RBC QN AUTO: 31.6 PG (ref 26–34)
MCHC RBC AUTO-ENTMCNC: 32.8 G/DL (ref 31–37)
MCV RBC AUTO: 96.4 FL
MONOCYTES # BLD AUTO: 0.98 X10(3) UL (ref 0.1–1)
MONOCYTES NFR BLD AUTO: 8.7 %
NEUTROPHILS # BLD AUTO: 9.55 X10 (3) UL (ref 1.5–7.7)
NEUTROPHILS # BLD AUTO: 9.55 X10(3) UL (ref 1.5–7.7)
NEUTROPHILS NFR BLD AUTO: 84.6 %
OSMOLALITY SERPL CALC.SUM OF ELEC: 286 MOSM/KG (ref 275–295)
PLATELET # BLD AUTO: 289 10(3)UL (ref 150–450)
POTASSIUM SERPL-SCNC: 3.7 MMOL/L (ref 3.5–5.1)
RBC # BLD AUTO: 2.5 X10(6)UL
SODIUM SERPL-SCNC: 138 MMOL/L (ref 136–145)
WBC # BLD AUTO: 11.3 X10(3) UL (ref 4–11)

## 2022-02-07 PROCEDURE — 80048 BASIC METABOLIC PNL TOTAL CA: CPT | Performed by: INTERNAL MEDICINE

## 2022-02-07 PROCEDURE — 85025 COMPLETE CBC W/AUTO DIFF WBC: CPT | Performed by: INTERNAL MEDICINE

## 2022-02-07 RX ORDER — MULTIPLE VITAMINS W/ MINERALS TAB 9MG-400MCG
1 TAB ORAL DAILY
Status: DISCONTINUED | OUTPATIENT
Start: 2022-02-07 | End: 2022-02-10

## 2022-02-07 NOTE — PLAN OF CARE
Patient alert and oriented x4. Patient has productive cough. Patient satting in mid 90s on room air. Afebrile. PRN lozenge provided. IV antibiotic infusing, medication administered per MAR. VSS. Fall precautions continued. Call light within reach. Will continue to monitor. Problem: RESPIRATORY - ADULT  Goal: Achieves optimal ventilation and oxygenation  Description: INTERVENTIONS:  - Assess for changes in respiratory status  - Assess for changes in mentation and behavior  - Position to facilitate oxygenation and minimize respiratory effort  - Oxygen supplementation based on oxygen saturation or ABGs  - Provide Smoking Cessation handout, if applicable  - Encourage broncho-pulmonary hygiene including cough, deep breathe, Incentive Spirometry  - Assess the need for suctioning and perform as needed  - Assess and instruct to report SOB or any respiratory difficulty  - Respiratory Therapy support as indicated  - Manage/alleviate anxiety  - Monitor for signs/symptoms of CO2 retention  Outcome: Progressing     Problem: SAFETY ADULT - FALL  Goal: Free from fall injury  Description: INTERVENTIONS:  - Assess pt frequently for physical needs  - Identify cognitive and physical deficits and behaviors that affect risk of falls.   - [place] fall precautions as indicated by assessment.  - Educate pt/family on patient safety including physical limitations  - Instruct pt to call for assistance with activity based on assessment  - Modify environment to reduce risk of injury  - Provide assistive devices as appropriate  - Consider OT/PT consult to assist with strengthening/mobility  - Encourage toileting schedule  Outcome: Progressing

## 2022-02-07 NOTE — PLAN OF CARE
Problem: RESPIRATORY - ADULT  Goal: Achieves optimal ventilation and oxygenation  Description: INTERVENTIONS:  - Assess for changes in respiratory status  - Assess for changes in mentation and behavior  - Position to facilitate oxygenation and minimize respiratory effort  - Oxygen supplementation based on oxygen saturation or ABGs  - Provide Smoking Cessation handout, if applicable  - Encourage broncho-pulmonary hygiene including cough, deep breathe, Incentive Spirometry  - Assess the need for suctioning and perform as needed  - Assess and instruct to report SOB or any respiratory difficulty  - Respiratory Therapy support as indicated  - Manage/alleviate anxiety  - Monitor for signs/symptoms of CO2 retention  Outcome: Progressing     Patient AOx4, room air. Prod cough noted on IV abt for PNA. R chest port-a-cath not accessed, PVL on KVO. New consult for hematology/onco/. For PT/OT eval. Encouraged patient to be more mobile or sit up in chair today but refused, prefers to sit up for lunch instead. Encouraged used of flutter valve at bedside. Bed alarm on, call light placed within patient's reach. Discharge dispo: home with [facility]

## 2022-02-08 LAB
ANION GAP SERPL CALC-SCNC: 6 MMOL/L (ref 0–18)
BASOPHILS # BLD AUTO: 0.04 X10(3) UL (ref 0–0.2)
BASOPHILS NFR BLD AUTO: 0.3 %
BUN BLD-MCNC: 13 MG/DL (ref 7–18)
CALCIUM BLD-MCNC: 7.6 MG/DL (ref 8.5–10.1)
CHLORIDE SERPL-SCNC: 109 MMOL/L (ref 98–112)
CO2 SERPL-SCNC: 24 MMOL/L (ref 21–32)
CREAT BLD-MCNC: 0.66 MG/DL
EOSINOPHIL # BLD AUTO: 0.17 X10(3) UL (ref 0–0.7)
EOSINOPHIL NFR BLD AUTO: 1.4 %
ERYTHROCYTE [DISTWIDTH] IN BLOOD BY AUTOMATED COUNT: 16.8 %
GLUCOSE BLD-MCNC: 96 MG/DL (ref 70–99)
HCT VFR BLD AUTO: 23.9 %
HGB BLD-MCNC: 7.9 G/DL
IMM GRANULOCYTES # BLD AUTO: 0.07 X10(3) UL (ref 0–1)
IMM GRANULOCYTES NFR BLD: 0.6 %
LYMPHOCYTES # BLD AUTO: 0.7 X10(3) UL (ref 1–4)
LYMPHOCYTES NFR BLD AUTO: 5.8 %
MCH RBC QN AUTO: 31.2 PG (ref 26–34)
MCHC RBC AUTO-ENTMCNC: 33.1 G/DL (ref 31–37)
MCV RBC AUTO: 94.5 FL
MONOCYTES # BLD AUTO: 1.02 X10(3) UL (ref 0.1–1)
MONOCYTES NFR BLD AUTO: 8.4 %
NEUTROPHILS # BLD AUTO: 10.15 X10 (3) UL (ref 1.5–7.7)
NEUTROPHILS # BLD AUTO: 10.15 X10(3) UL (ref 1.5–7.7)
NEUTROPHILS NFR BLD AUTO: 83.5 %
OSMOLALITY SERPL CALC.SUM OF ELEC: 288 MOSM/KG (ref 275–295)
PLATELET # BLD AUTO: 198 10(3)UL (ref 150–450)
POTASSIUM SERPL-SCNC: 3.7 MMOL/L (ref 3.5–5.1)
RBC # BLD AUTO: 2.53 X10(6)UL
SODIUM SERPL-SCNC: 139 MMOL/L (ref 136–145)
WBC # BLD AUTO: 12.2 X10(3) UL (ref 4–11)

## 2022-02-08 PROCEDURE — 80048 BASIC METABOLIC PNL TOTAL CA: CPT | Performed by: INTERNAL MEDICINE

## 2022-02-08 PROCEDURE — 97162 PT EVAL MOD COMPLEX 30 MIN: CPT

## 2022-02-08 PROCEDURE — 97116 GAIT TRAINING THERAPY: CPT

## 2022-02-08 PROCEDURE — 85025 COMPLETE CBC W/AUTO DIFF WBC: CPT | Performed by: INTERNAL MEDICINE

## 2022-02-08 RX ORDER — GARLIC EXTRACT 500 MG
1 CAPSULE ORAL DAILY
Status: DISCONTINUED | OUTPATIENT
Start: 2022-02-08 | End: 2022-02-10

## 2022-02-08 NOTE — PLAN OF CARE
Pt received A&Ox4. Afebrile. VSS. Denies pain. C/o hoarseness. PRN cepacol given. Productive cough, pt using flutter valve at bedside. Satting in mid 90s on RA. Pt up w/ PT today. Walking around room w/ [employer]. Encouraged to sit up in chair, refused this AM. Pt w/ frequent BMs overnight. R/o c.diff isolation in place. IV abx per MAR. Pt's wife updated on POC at the bedside. Fall precautions in place. Call light in reach. Will continue to monitor.

## 2022-02-08 NOTE — PLAN OF CARE
Patient alert and oriented x4. Patient has productive cough. Flutter valve used 2 times this shift. Satting in mid 90s on room air. VSS. Afebrile. PRN lozenge given. IV antibiotics infusing, medications administered per MAR. Fall precautions continued. Call light within reach. Will continue to monitor. Problem: RESPIRATORY - ADULT  Goal: Achieves optimal ventilation and oxygenation  Description: INTERVENTIONS:  - Assess for changes in respiratory status  - Assess for changes in mentation and behavior  - Position to facilitate oxygenation and minimize respiratory effort  - Oxygen supplementation based on oxygen saturation or ABGs  - Provide Smoking Cessation handout, if applicable  - Encourage broncho-pulmonary hygiene including cough, deep breathe, Incentive Spirometry  - Assess the need for suctioning and perform as needed  - Assess and instruct to report SOB or any respiratory difficulty  - Respiratory Therapy support as indicated  - Manage/alleviate anxiety  - Monitor for signs/symptoms of CO2 retention  Outcome: Progressing     Problem: SAFETY ADULT - FALL  Goal: Free from fall injury  Description: INTERVENTIONS:  - Assess pt frequently for physical needs  - Identify cognitive and physical deficits and behaviors that affect risk of falls.   - [place] fall precautions as indicated by assessment.  - Educate pt/family on patient safety including physical limitations  - Instruct pt to call for assistance with activity based on assessment  - Modify environment to reduce risk of injury  - Provide assistive devices as appropriate  - Consider OT/PT consult to assist with strengthening/mobility  - Encourage toileting schedule  Outcome: Progressing

## 2022-02-09 LAB
ANION GAP SERPL CALC-SCNC: 6 MMOL/L (ref 0–18)
BASOPHILS # BLD AUTO: 0.04 X10(3) UL (ref 0–0.2)
BASOPHILS NFR BLD AUTO: 0.3 %
BUN BLD-MCNC: 13 MG/DL (ref 7–18)
CALCIUM BLD-MCNC: 7.7 MG/DL (ref 8.5–10.1)
CHLORIDE SERPL-SCNC: 108 MMOL/L (ref 98–112)
CO2 SERPL-SCNC: 25 MMOL/L (ref 21–32)
CREAT BLD-MCNC: 0.73 MG/DL
EOSINOPHIL # BLD AUTO: 0.15 X10(3) UL (ref 0–0.7)
EOSINOPHIL NFR BLD AUTO: 1.1 %
ERYTHROCYTE [DISTWIDTH] IN BLOOD BY AUTOMATED COUNT: 16.9 %
GLUCOSE BLD-MCNC: 93 MG/DL (ref 70–99)
HCT VFR BLD AUTO: 25.1 %
HGB BLD-MCNC: 7.9 G/DL
IMM GRANULOCYTES # BLD AUTO: 0.09 X10(3) UL (ref 0–1)
IMM GRANULOCYTES NFR BLD: 0.6 %
LYMPHOCYTES # BLD AUTO: 0.59 X10(3) UL (ref 1–4)
LYMPHOCYTES NFR BLD AUTO: 4.2 %
MCH RBC QN AUTO: 31.1 PG (ref 26–34)
MCHC RBC AUTO-ENTMCNC: 31.5 G/DL (ref 31–37)
MCV RBC AUTO: 98.8 FL
MONOCYTES # BLD AUTO: 1.01 X10(3) UL (ref 0.1–1)
MONOCYTES NFR BLD AUTO: 7.2 %
NEUTROPHILS # BLD AUTO: 12.12 X10 (3) UL (ref 1.5–7.7)
NEUTROPHILS # BLD AUTO: 12.12 X10(3) UL (ref 1.5–7.7)
NEUTROPHILS NFR BLD AUTO: 86.6 %
OSMOLALITY SERPL CALC.SUM OF ELEC: 288 MOSM/KG (ref 275–295)
PLATELET # BLD AUTO: 368 10(3)UL (ref 150–450)
POTASSIUM SERPL-SCNC: 3.6 MMOL/L (ref 3.5–5.1)
RBC # BLD AUTO: 2.54 X10(6)UL
SODIUM SERPL-SCNC: 139 MMOL/L (ref 136–145)
WBC # BLD AUTO: 14 X10(3) UL (ref 4–11)

## 2022-02-09 PROCEDURE — 85025 COMPLETE CBC W/AUTO DIFF WBC: CPT | Performed by: INTERNAL MEDICINE

## 2022-02-09 PROCEDURE — 80048 BASIC METABOLIC PNL TOTAL CA: CPT | Performed by: INTERNAL MEDICINE

## 2022-02-09 RX ORDER — GUAIFENESIN 600 MG
600 TABLET, EXTENDED RELEASE 12 HR ORAL 2 TIMES DAILY
Status: DISCONTINUED | OUTPATIENT
Start: 2022-02-09 | End: 2022-02-10

## 2022-02-09 NOTE — PROGRESS NOTES
Pt A&Ox4, denies pain. VSS. Reports congestion, encouraged use of flutter valve and cough & deep breathe. No BMs during this shift. All scheduled meds administered per [facility]. Fall precautions in place. Pt got 7 hours of sleep throughout evening.

## 2022-02-09 NOTE — PLAN OF CARE
Pt received A&Ox4. Afebrile. VSS. Denies pain. Up to chair this afternoon, but verbalizing weakness. C/o hoarseness and cough. Mucinex given per MD. PRN cepacol provided. Utilizing flutter valve at the bedside. No stool for 24 hrs. C.diff isolation dc per protocol. Zosyn infusing per MAR. Wife updated on POC at bedside. HH arranged per SW. Fall precautions in place. Call light in reach. Will continue to monitor.

## 2022-02-09 NOTE — CM/SW NOTE
Met w/pt and pt's wife, entered a resume HH order. Also provided them with a caregiver list. Pt has a walker and a bed alarm at home. Wife feels they have all of the necessary DME in their home. Will follow up if any other needs arise.

## 2022-02-10 VITALS
OXYGEN SATURATION: 97 % | TEMPERATURE: 98 F | DIASTOLIC BLOOD PRESSURE: 60 MMHG | RESPIRATION RATE: 18 BRPM | HEART RATE: 102 BPM | BODY MASS INDEX: 17 KG/M2 | WEIGHT: 123 LBS | SYSTOLIC BLOOD PRESSURE: 112 MMHG

## 2022-02-10 LAB
ANION GAP SERPL CALC-SCNC: 7 MMOL/L (ref 0–18)
BASOPHILS # BLD AUTO: 0.04 X10(3) UL (ref 0–0.2)
BASOPHILS NFR BLD AUTO: 0.3 %
BUN BLD-MCNC: 15 MG/DL (ref 7–18)
CALCIUM BLD-MCNC: 7.7 MG/DL (ref 8.5–10.1)
CHLORIDE SERPL-SCNC: 109 MMOL/L (ref 98–112)
CO2 SERPL-SCNC: 24 MMOL/L (ref 21–32)
CREAT BLD-MCNC: 0.65 MG/DL
EOSINOPHIL NFR BLD AUTO: 1.2 %
ERYTHROCYTE [DISTWIDTH] IN BLOOD BY AUTOMATED COUNT: 17.1 %
GLUCOSE BLD-MCNC: 91 MG/DL (ref 70–99)
HCT VFR BLD AUTO: 25 %
HGB BLD-MCNC: 8 G/DL
IMM GRANULOCYTES # BLD AUTO: 0.11 X10(3) UL (ref 0–1)
IMM GRANULOCYTES NFR BLD: 0.8 %
LYMPHOCYTES # BLD AUTO: 0.75 X10(3) UL (ref 1–4)
LYMPHOCYTES NFR BLD AUTO: 5.2 %
MCH RBC QN AUTO: 31.4 PG (ref 26–34)
MCHC RBC AUTO-ENTMCNC: 32 G/DL (ref 31–37)
MCV RBC AUTO: 98 FL
MONOCYTES # BLD AUTO: 0.87 X10(3) UL (ref 0.1–1)
MONOCYTES NFR BLD AUTO: 6 %
NEUTROPHILS # BLD AUTO: 12.6 X10(3) UL (ref 1.5–7.7)
NEUTROPHILS NFR BLD AUTO: 86.5 %
OSMOLALITY SERPL CALC.SUM OF ELEC: 290 MOSM/KG (ref 275–295)
PLATELET # BLD AUTO: 330 10(3)UL (ref 150–450)
POTASSIUM SERPL-SCNC: 3.6 MMOL/L (ref 3.5–5.1)
RBC # BLD AUTO: 2.55 X10(6)UL
SODIUM SERPL-SCNC: 140 MMOL/L (ref 136–145)
WBC # BLD AUTO: 14.5 X10(3) UL (ref 4–11)

## 2022-02-10 PROCEDURE — 85025 COMPLETE CBC W/AUTO DIFF WBC: CPT | Performed by: INTERNAL MEDICINE

## 2022-02-10 PROCEDURE — 80048 BASIC METABOLIC PNL TOTAL CA: CPT | Performed by: INTERNAL MEDICINE

## 2022-02-10 PROCEDURE — 97116 GAIT TRAINING THERAPY: CPT

## 2022-02-10 PROCEDURE — 97530 THERAPEUTIC ACTIVITIES: CPT

## 2022-02-10 RX ORDER — AMOXICILLIN AND CLAVULANATE POTASSIUM 875; 125 MG/1; MG/1
1 TABLET, FILM COATED ORAL 2 TIMES DAILY
Qty: 6 TABLET | Refills: 0 | Status: SHIPPED | OUTPATIENT
Start: 2022-02-11 | End: 2022-02-14

## 2022-02-10 RX ORDER — GUAIFENESIN 600 MG
600 TABLET, EXTENDED RELEASE 12 HR ORAL 2 TIMES DAILY PRN
Qty: 20 TABLET | Refills: 0 | Status: SHIPPED | OUTPATIENT
Start: 2022-02-10

## 2022-02-10 NOTE — PLAN OF CARE
Problem: RESPIRATORY - ADULT  Goal: Achieves optimal ventilation and oxygenation  Description: INTERVENTIONS:  - Assess for changes in respiratory status  - Assess for changes in mentation and behavior  - Position to facilitate oxygenation and minimize respiratory effort  - Oxygen supplementation based on oxygen saturation or ABGs  - Provide Smoking Cessation handout, if applicable  - Encourage broncho-pulmonary hygiene including cough, deep breathe, Incentive Spirometry  - Assess the need for suctioning and perform as needed  - Assess and instruct to report SOB or any respiratory difficulty  - Respiratory Therapy support as indicated  - Manage/alleviate anxiety  - Monitor for signs/symptoms of CO2 retention  Outcome: Progressing     Problem: SAFETY ADULT - FALL  Goal: Free from fall injury  Description: INTERVENTIONS:  - Assess pt frequently for physical needs  - Identify cognitive and physical deficits and behaviors that affect risk of falls.   - [place] fall precautions as indicated by assessment.  - Educate pt/family on patient safety including physical limitations  - Instruct pt to call for assistance with activity based on assessment  - Modify environment to reduce risk of injury  - Provide assistive devices as appropriate  - Consider OT/PT consult to assist with strengthening/mobility  - Encourage toileting schedule  Outcome: Progressing

## 2022-02-10 NOTE — PROGRESS NOTES
Patient is AOx4, on RA. Pt states he feels better than couple days ago and wants to go home. Denies pain or SOB. IV antibiotics administered as ordered. No acute events overnight. Will continue to monitor.

## 2022-02-10 NOTE — BH RN DISCHARGE NOTE
RN reviewed discharge papers including MD follow-up and medications. medications included purpose /dosages and side effects. Identified abx to be picked up at [facility]. Patient transport via wheelchair to [employer] where son is picking up. Wife also transported via wheelchair. Patient had no distress noted upon departure.

## 2022-02-10 NOTE — PROGRESS NOTES
RN notified by CNA that patient indicated he was not ready to go home. RN spoke with patient about plant o go home and follow-up. Patient in agreement. RN called wife for pick-up; waiting for wife to come or return call.

## 2022-02-14 ENCOUNTER — LAB REQUISITION (OUTPATIENT)
Dept: LAB | Facility: HOSPITAL | Age: 87
End: 2022-02-14
Payer: MEDICARE

## 2022-02-14 LAB
BASOPHILS # BLD AUTO: 0.04 X10(3) UL (ref 0–0.2)
BASOPHILS NFR BLD AUTO: 0.3 %
EOSINOPHIL # BLD AUTO: 0.1 X10(3) UL (ref 0–0.7)
EOSINOPHIL NFR BLD AUTO: 0.8 %
ERYTHROCYTE [DISTWIDTH] IN BLOOD BY AUTOMATED COUNT: 17.6 %
HCT VFR BLD AUTO: 25.5 %
IMM GRANULOCYTES # BLD AUTO: 0.11 X10(3) UL (ref 0–1)
IMM GRANULOCYTES NFR BLD: 0.9 %
LYMPHOCYTES # BLD AUTO: 0.97 X10(3) UL (ref 1–4)
LYMPHOCYTES NFR BLD AUTO: 7.8 %
MCH RBC QN AUTO: 30.7 PG (ref 26–34)
MCHC RBC AUTO-ENTMCNC: 31.4 G/DL (ref 31–37)
MCV RBC AUTO: 97.7 FL
MONOCYTES # BLD AUTO: 0.8 X10(3) UL (ref 0.1–1)
MONOCYTES NFR BLD AUTO: 6.4 %
NEUTROPHILS # BLD AUTO: 10.49 X10 (3) UL (ref 1.5–7.7)
NEUTROPHILS # BLD AUTO: 10.49 X10(3) UL (ref 1.5–7.7)
NEUTROPHILS NFR BLD AUTO: 83.8 %
PLATELET # BLD AUTO: 399 10(3)UL (ref 150–450)
RBC # BLD AUTO: 2.61 X10(6)UL
WBC # BLD AUTO: 12.5 X10(3) UL (ref 4–11)

## 2022-02-14 PROCEDURE — 85025 COMPLETE CBC W/AUTO DIFF WBC: CPT | Performed by: INTERNAL MEDICINE

## 2022-02-21 ENCOUNTER — LAB REQUISITION (OUTPATIENT)
Dept: LAB | Facility: HOSPITAL | Age: 87
End: 2022-02-21
Payer: MEDICARE

## 2022-02-21 LAB
BASOPHILS # BLD AUTO: 0.06 X10(3) UL (ref 0–0.2)
BASOPHILS NFR BLD AUTO: 0.6 %
EOSINOPHIL # BLD AUTO: 0.17 X10(3) UL (ref 0–0.7)
EOSINOPHIL NFR BLD AUTO: 1.7 %
ERYTHROCYTE [DISTWIDTH] IN BLOOD BY AUTOMATED COUNT: 20.4 %
HCT VFR BLD AUTO: 23.7 %
HGB BLD-MCNC: 7.3 G/DL
IMM GRANULOCYTES # BLD AUTO: 0.04 X10(3) UL (ref 0–1)
IMM GRANULOCYTES NFR BLD: 0.4 %
LYMPHOCYTES # BLD AUTO: 1.03 X10(3) UL (ref 1–4)
LYMPHOCYTES NFR BLD AUTO: 10.2 %
MCH RBC QN AUTO: 31.7 PG (ref 26–34)
MCHC RBC AUTO-ENTMCNC: 30.8 G/DL (ref 31–37)
MCV RBC AUTO: 103 FL
MONOCYTES # BLD AUTO: 0.85 X10(3) UL (ref 0.1–1)
MONOCYTES NFR BLD AUTO: 8.4 %
NEUTROPHILS # BLD AUTO: 7.92 X10 (3) UL (ref 1.5–7.7)
NEUTROPHILS # BLD AUTO: 7.92 X10(3) UL (ref 1.5–7.7)
NEUTROPHILS NFR BLD AUTO: 78.7 %
PLATELET # BLD AUTO: 382 10(3)UL (ref 150–450)
RBC # BLD AUTO: 2.3 X10(6)UL
WBC # BLD AUTO: 10.1 X10(3) UL (ref 4–11)

## 2022-02-21 PROCEDURE — 85025 COMPLETE CBC W/AUTO DIFF WBC: CPT | Performed by: INTERNAL MEDICINE

## 2022-02-22 RX ORDER — DIPHENHYDRAMINE HCL 25 MG
25 CAPSULE ORAL ONCE
Status: CANCELLED | OUTPATIENT
Start: 2022-02-22

## 2022-02-22 RX ORDER — ACETAMINOPHEN 325 MG/1
650 TABLET ORAL ONCE
Status: CANCELLED | OUTPATIENT
Start: 2022-02-22

## 2022-02-24 ENCOUNTER — OFFICE VISIT (OUTPATIENT)
Dept: HEMATOLOGY/ONCOLOGY | Facility: HOSPITAL | Age: 87
End: 2022-02-24
Attending: INTERNAL MEDICINE
Payer: MEDICARE

## 2022-02-24 VITALS
OXYGEN SATURATION: 97 % | HEART RATE: 89 BPM | RESPIRATION RATE: 18 BRPM | SYSTOLIC BLOOD PRESSURE: 98 MMHG | DIASTOLIC BLOOD PRESSURE: 61 MMHG | TEMPERATURE: 98 F

## 2022-02-24 DIAGNOSIS — D64.9 ANEMIA, UNSPECIFIED TYPE: Primary | ICD-10-CM

## 2022-02-24 LAB
ANTIBODY SCREEN: NEGATIVE
RH BLOOD TYPE: NEGATIVE

## 2022-02-24 PROCEDURE — 36430 TRANSFUSION BLD/BLD COMPNT: CPT

## 2022-02-24 PROCEDURE — 36415 COLL VENOUS BLD VENIPUNCTURE: CPT

## 2022-02-24 PROCEDURE — 86850 RBC ANTIBODY SCREEN: CPT

## 2022-02-24 PROCEDURE — 86920 COMPATIBILITY TEST SPIN: CPT

## 2022-02-24 PROCEDURE — 86901 BLOOD TYPING SEROLOGIC RH(D): CPT

## 2022-02-24 PROCEDURE — 86900 BLOOD TYPING SEROLOGIC ABO: CPT

## 2022-02-24 RX ORDER — ACETAMINOPHEN 325 MG/1
650 TABLET ORAL ONCE
Status: CANCELLED | OUTPATIENT
Start: 2022-02-24

## 2022-02-24 RX ORDER — ACETAMINOPHEN 325 MG/1
650 TABLET ORAL ONCE
Status: COMPLETED | OUTPATIENT
Start: 2022-02-24 | End: 2022-02-24

## 2022-02-24 RX ORDER — DIPHENHYDRAMINE HCL 25 MG
25 CAPSULE ORAL ONCE
Status: COMPLETED | OUTPATIENT
Start: 2022-02-24 | End: 2022-02-24

## 2022-02-24 RX ORDER — DIPHENHYDRAMINE HCL 25 MG
25 CAPSULE ORAL ONCE
Status: CANCELLED | OUTPATIENT
Start: 2022-02-24

## 2022-02-24 RX ADMIN — DIPHENHYDRAMINE HCL 25 MG: 25 MG CAPSULE ORAL at 10:32:00

## 2022-02-24 RX ADMIN — ACETAMINOPHEN 650 MG: 325 TABLET ORAL at 10:32:00

## 2022-02-24 NOTE — PROGRESS NOTES
Education Record    Learner:  Patient and family member    Disease / Diagnosis: 1 unit PRBC    Barriers / Limitations:  Fatigue   Comments:    Method:  Brief focused and Reinforcement   Comments:    General Topics:  Plan of care reviewed   Comments:    Outcome:  Shows understanding   Comments: Patient and daughter were asked about repeat labs and stated they did not want to wait an extra hour post transfusion today for repeat labs and verbalized they will repeat labs tomorrow with home health instead.

## 2022-02-25 LAB — BLOOD TYPE BARCODE: 9500

## 2022-02-28 ENCOUNTER — LAB REQUISITION (OUTPATIENT)
Dept: LAB | Facility: HOSPITAL | Age: 87
End: 2022-02-28
Payer: MEDICARE

## 2022-02-28 LAB
BASOPHILS # BLD AUTO: 0.06 X10(3) UL (ref 0–0.2)
BASOPHILS NFR BLD AUTO: 0.6 %
EOSINOPHIL # BLD AUTO: 0.15 X10(3) UL (ref 0–0.7)
EOSINOPHIL NFR BLD AUTO: 1.5 %
HCT VFR BLD AUTO: 30.6 %
HGB BLD-MCNC: 9.7 G/DL
IMM GRANULOCYTES # BLD AUTO: 0.03 X10(3) UL (ref 0–1)
IMM GRANULOCYTES NFR BLD: 0.3 %
LYMPHOCYTES # BLD AUTO: 0.8 X10(3) UL (ref 1–4)
LYMPHOCYTES NFR BLD AUTO: 7.8 %
MCH RBC QN AUTO: 32 PG (ref 26–34)
MCHC RBC AUTO-ENTMCNC: 31.7 G/DL (ref 31–37)
MCV RBC AUTO: 101 FL
MONOCYTES # BLD AUTO: 0.78 X10(3) UL (ref 0.1–1)
MONOCYTES NFR BLD AUTO: 7.6 %
NEUTROPHILS # BLD AUTO: 8.42 X10 (3) UL (ref 1.5–7.7)
NEUTROPHILS # BLD AUTO: 8.42 X10(3) UL (ref 1.5–7.7)
NEUTROPHILS NFR BLD AUTO: 82.2 %
PLATELET # BLD AUTO: 342 10(3)UL (ref 150–450)
RBC # BLD AUTO: 3.03 X10(6)UL
WBC # BLD AUTO: 10.2 X10(3) UL (ref 4–11)

## 2022-02-28 PROCEDURE — 85025 COMPLETE CBC W/AUTO DIFF WBC: CPT | Performed by: INTERNAL MEDICINE

## 2022-03-07 ENCOUNTER — LAB REQUISITION (OUTPATIENT)
Dept: LAB | Facility: HOSPITAL | Age: 87
End: 2022-03-07
Payer: MEDICARE

## 2022-03-07 LAB
BASOPHILS # BLD AUTO: 0.06 X10(3) UL (ref 0–0.2)
BASOPHILS NFR BLD AUTO: 0.7 %
EOSINOPHIL # BLD AUTO: 0.27 X10(3) UL (ref 0–0.7)
EOSINOPHIL NFR BLD AUTO: 3 %
ERYTHROCYTE [DISTWIDTH] IN BLOOD BY AUTOMATED COUNT: 19.9 %
HCT VFR BLD AUTO: 31.5 %
HGB BLD-MCNC: 9.7 G/DL
IMM GRANULOCYTES # BLD AUTO: 0.03 X10(3) UL (ref 0–1)
IMM GRANULOCYTES NFR BLD: 0.3 %
LYMPHOCYTES # BLD AUTO: 1 X10(3) UL (ref 1–4)
LYMPHOCYTES NFR BLD AUTO: 10.9 %
MCH RBC QN AUTO: 32 PG (ref 26–34)
MCHC RBC AUTO-ENTMCNC: 30.8 G/DL (ref 31–37)
MCV RBC AUTO: 104 FL
MONOCYTES # BLD AUTO: 0.76 X10(3) UL (ref 0.1–1)
MONOCYTES NFR BLD AUTO: 8.3 %
NEUTROPHILS # BLD AUTO: 7.03 X10 (3) UL (ref 1.5–7.7)
NEUTROPHILS # BLD AUTO: 7.03 X10(3) UL (ref 1.5–7.7)
NEUTROPHILS NFR BLD AUTO: 76.8 %
PLATELET # BLD AUTO: 286 10(3)UL (ref 150–450)
RBC # BLD AUTO: 3.03 X10(6)UL
WBC # BLD AUTO: 9.2 X10(3) UL (ref 4–11)

## 2022-03-07 PROCEDURE — 85025 COMPLETE CBC W/AUTO DIFF WBC: CPT | Performed by: INTERNAL MEDICINE

## 2022-03-14 ENCOUNTER — LAB REQUISITION (OUTPATIENT)
Dept: LAB | Facility: HOSPITAL | Age: 87
End: 2022-03-14
Payer: MEDICARE

## 2022-03-14 LAB
BASOPHILS # BLD AUTO: 0.04 X10(3) UL (ref 0–0.2)
BASOPHILS NFR BLD AUTO: 0.4 %
EOSINOPHIL # BLD AUTO: 0.34 X10(3) UL (ref 0–0.7)
EOSINOPHIL NFR BLD AUTO: 3.5 %
ERYTHROCYTE [DISTWIDTH] IN BLOOD BY AUTOMATED COUNT: 19.6 %
HCT VFR BLD AUTO: 30.8 %
HGB BLD-MCNC: 9.8 G/DL
IMM GRANULOCYTES # BLD AUTO: 0.04 X10(3) UL (ref 0–1)
IMM GRANULOCYTES NFR BLD: 0.4 %
LYMPHOCYTES # BLD AUTO: 1.4 X10(3) UL (ref 1–4)
LYMPHOCYTES NFR BLD AUTO: 14.4 %
MCH RBC QN AUTO: 32.6 PG (ref 26–34)
MCHC RBC AUTO-ENTMCNC: 31.8 G/DL (ref 31–37)
MCV RBC AUTO: 102.3 FL
MONOCYTES # BLD AUTO: 0.7 X10(3) UL (ref 0.1–1)
MONOCYTES NFR BLD AUTO: 7.2 %
NEUTROPHILS # BLD AUTO: 7.22 X10 (3) UL (ref 1.5–7.7)
NEUTROPHILS # BLD AUTO: 7.22 X10(3) UL (ref 1.5–7.7)
NEUTROPHILS NFR BLD AUTO: 74.1 %
PLATELET # BLD AUTO: 281 10(3)UL (ref 150–450)
RBC # BLD AUTO: 3.01 X10(6)UL
WBC # BLD AUTO: 9.7 X10(3) UL (ref 4–11)

## 2022-03-14 PROCEDURE — 85025 COMPLETE CBC W/AUTO DIFF WBC: CPT | Performed by: INTERNAL MEDICINE

## 2022-03-23 ENCOUNTER — LAB REQUISITION (OUTPATIENT)
Dept: LAB | Facility: HOSPITAL | Age: 87
End: 2022-03-23
Payer: MEDICARE

## 2022-03-23 LAB
BASOPHILS # BLD AUTO: 0.05 X10(3) UL (ref 0–0.2)
BASOPHILS NFR BLD AUTO: 0.5 %
EOSINOPHIL # BLD AUTO: 0.27 X10(3) UL (ref 0–0.7)
EOSINOPHIL NFR BLD AUTO: 2.6 %
ERYTHROCYTE [DISTWIDTH] IN BLOOD BY AUTOMATED COUNT: 19 %
HCT VFR BLD AUTO: 31.1 %
HGB BLD-MCNC: 9.5 G/DL
IMM GRANULOCYTES # BLD AUTO: 0.1 X10(3) UL (ref 0–1)
IMM GRANULOCYTES NFR BLD: 0.9 %
LYMPHOCYTES # BLD AUTO: 1.32 X10(3) UL (ref 1–4)
LYMPHOCYTES NFR BLD AUTO: 12.5 %
MCH RBC QN AUTO: 31.5 PG (ref 26–34)
MCHC RBC AUTO-ENTMCNC: 30.5 G/DL (ref 31–37)
MCV RBC AUTO: 103 FL
MONOCYTES # BLD AUTO: 0.97 X10(3) UL (ref 0.1–1)
MONOCYTES NFR BLD AUTO: 9.2 %
NEUTROPHILS # BLD AUTO: 7.83 X10 (3) UL (ref 1.5–7.7)
NEUTROPHILS # BLD AUTO: 7.83 X10(3) UL (ref 1.5–7.7)
NEUTROPHILS NFR BLD AUTO: 74.3 %
PLATELET # BLD AUTO: 322 10(3)UL (ref 150–450)
RBC # BLD AUTO: 3.02 X10(6)UL
WBC # BLD AUTO: 10.5 X10(3) UL (ref 4–11)

## 2022-03-23 PROCEDURE — 85025 COMPLETE CBC W/AUTO DIFF WBC: CPT | Performed by: INTERNAL MEDICINE

## 2022-03-28 ENCOUNTER — LAB REQUISITION (OUTPATIENT)
Dept: LAB | Facility: HOSPITAL | Age: 87
End: 2022-03-28
Payer: MEDICARE

## 2022-03-28 LAB
BASOPHILS # BLD AUTO: 0.04 X10(3) UL (ref 0–0.2)
BASOPHILS NFR BLD AUTO: 0.4 %
EOSINOPHIL # BLD AUTO: 0.27 X10(3) UL (ref 0–0.7)
EOSINOPHIL NFR BLD AUTO: 2.6 %
ERYTHROCYTE [DISTWIDTH] IN BLOOD BY AUTOMATED COUNT: 18.8 %
HCT VFR BLD AUTO: 35.6 %
HGB BLD-MCNC: 10.7 G/DL
IMM GRANULOCYTES # BLD AUTO: 0.04 X10(3) UL (ref 0–1)
IMM GRANULOCYTES NFR BLD: 0.4 %
LYMPHOCYTES # BLD AUTO: 1.37 X10(3) UL (ref 1–4)
LYMPHOCYTES NFR BLD AUTO: 13 %
MCH RBC QN AUTO: 31.7 PG (ref 26–34)
MCHC RBC AUTO-ENTMCNC: 30.1 G/DL (ref 31–37)
MCV RBC AUTO: 105.3 FL
MONOCYTES # BLD AUTO: 0.93 X10(3) UL (ref 0.1–1)
MONOCYTES NFR BLD AUTO: 8.8 %
NEUTROPHILS # BLD AUTO: 7.9 X10 (3) UL (ref 1.5–7.7)
NEUTROPHILS # BLD AUTO: 7.9 X10(3) UL (ref 1.5–7.7)
NEUTROPHILS NFR BLD AUTO: 74.8 %
PLATELET # BLD AUTO: 399 10(3)UL (ref 150–450)
RBC # BLD AUTO: 3.38 X10(6)UL
WBC # BLD AUTO: 10.6 X10(3) UL (ref 4–11)

## 2022-03-28 PROCEDURE — 85025 COMPLETE CBC W/AUTO DIFF WBC: CPT | Performed by: INTERNAL MEDICINE

## 2022-04-04 ENCOUNTER — LAB REQUISITION (OUTPATIENT)
Dept: LAB | Facility: HOSPITAL | Age: 87
End: 2022-04-04
Payer: MEDICARE

## 2022-04-04 LAB
BASOPHILS # BLD AUTO: 0.06 X10(3) UL (ref 0–0.2)
BASOPHILS NFR BLD AUTO: 0.6 %
EOSINOPHIL # BLD AUTO: 0.27 X10(3) UL (ref 0–0.7)
EOSINOPHIL NFR BLD AUTO: 2.7 %
ERYTHROCYTE [DISTWIDTH] IN BLOOD BY AUTOMATED COUNT: 18.8 %
HCT VFR BLD AUTO: 32.1 %
HGB BLD-MCNC: 10.3 G/DL
IMM GRANULOCYTES # BLD AUTO: 0.04 X10(3) UL (ref 0–1)
IMM GRANULOCYTES NFR BLD: 0.4 %
LYMPHOCYTES # BLD AUTO: 1.15 X10(3) UL (ref 1–4)
LYMPHOCYTES NFR BLD AUTO: 11.7 %
MCH RBC QN AUTO: 31.8 PG (ref 26–34)
MCHC RBC AUTO-ENTMCNC: 32.1 G/DL (ref 31–37)
MCV RBC AUTO: 99.1 FL
MONOCYTES # BLD AUTO: 0.86 X10(3) UL (ref 0.1–1)
MONOCYTES NFR BLD AUTO: 8.7 %
NEUTROPHILS # BLD AUTO: 7.46 X10 (3) UL (ref 1.5–7.7)
NEUTROPHILS # BLD AUTO: 7.46 X10(3) UL (ref 1.5–7.7)
NEUTROPHILS NFR BLD AUTO: 75.9 %
PLATELET # BLD AUTO: 346 10(3)UL (ref 150–450)
RBC # BLD AUTO: 3.24 X10(6)UL
WBC # BLD AUTO: 9.8 X10(3) UL (ref 4–11)

## 2022-04-04 PROCEDURE — 85025 COMPLETE CBC W/AUTO DIFF WBC: CPT | Performed by: INTERNAL MEDICINE

## 2022-04-11 ENCOUNTER — LAB REQUISITION (OUTPATIENT)
Dept: LAB | Facility: HOSPITAL | Age: 87
End: 2022-04-11
Payer: MEDICARE

## 2022-04-11 LAB
BASOPHILS # BLD AUTO: 0.04 X10(3) UL (ref 0–0.2)
BASOPHILS NFR BLD AUTO: 0.3 %
EOSINOPHIL # BLD AUTO: 0.31 X10(3) UL (ref 0–0.7)
EOSINOPHIL NFR BLD AUTO: 2.6 %
ERYTHROCYTE [DISTWIDTH] IN BLOOD BY AUTOMATED COUNT: 18.8 %
HCT VFR BLD AUTO: 31.9 %
HGB BLD-MCNC: 9.9 G/DL
IMM GRANULOCYTES # BLD AUTO: 0.05 X10(3) UL (ref 0–1)
IMM GRANULOCYTES NFR BLD: 0.4 %
LYMPHOCYTES # BLD AUTO: 1.34 X10(3) UL (ref 1–4)
LYMPHOCYTES NFR BLD AUTO: 11.3 %
MCH RBC QN AUTO: 31.5 PG (ref 26–34)
MCHC RBC AUTO-ENTMCNC: 31 G/DL (ref 31–37)
MCV RBC AUTO: 101.6 FL
MONOCYTES # BLD AUTO: 1.03 X10(3) UL (ref 0.1–1)
MONOCYTES NFR BLD AUTO: 8.7 %
NEUTROPHILS # BLD AUTO: 9.1 X10 (3) UL (ref 1.5–7.7)
NEUTROPHILS # BLD AUTO: 9.1 X10(3) UL (ref 1.5–7.7)
NEUTROPHILS NFR BLD AUTO: 76.7 %
PLATELET # BLD AUTO: 369 10(3)UL (ref 150–450)
RBC # BLD AUTO: 3.14 X10(6)UL
WBC # BLD AUTO: 11.9 X10(3) UL (ref 4–11)

## 2022-04-11 PROCEDURE — 85025 COMPLETE CBC W/AUTO DIFF WBC: CPT | Performed by: INTERNAL MEDICINE

## 2022-04-18 ENCOUNTER — LAB REQUISITION (OUTPATIENT)
Dept: LAB | Facility: HOSPITAL | Age: 87
End: 2022-04-18
Payer: MEDICARE

## 2022-04-18 LAB
BASOPHILS # BLD AUTO: 0.05 X10(3) UL (ref 0–0.2)
BASOPHILS NFR BLD AUTO: 0.4 %
EOSINOPHIL # BLD AUTO: 0.26 X10(3) UL (ref 0–0.7)
EOSINOPHIL NFR BLD AUTO: 2.2 %
ERYTHROCYTE [DISTWIDTH] IN BLOOD BY AUTOMATED COUNT: 18.9 %
HCT VFR BLD AUTO: 34.6 %
HGB BLD-MCNC: 10.5 G/DL
IMM GRANULOCYTES # BLD AUTO: 0.04 X10(3) UL (ref 0–1)
IMM GRANULOCYTES NFR BLD: 0.3 %
LYMPHOCYTES # BLD AUTO: 1.29 X10(3) UL (ref 1–4)
LYMPHOCYTES NFR BLD AUTO: 10.8 %
MCH RBC QN AUTO: 30.8 PG (ref 26–34)
MCHC RBC AUTO-ENTMCNC: 30.3 G/DL (ref 31–37)
MCV RBC AUTO: 101.5 FL
MONOCYTES # BLD AUTO: 1.07 X10(3) UL (ref 0.1–1)
MONOCYTES NFR BLD AUTO: 9 %
NEUTROPHILS # BLD AUTO: 9.19 X10 (3) UL (ref 1.5–7.7)
NEUTROPHILS # BLD AUTO: 9.19 X10(3) UL (ref 1.5–7.7)
NEUTROPHILS NFR BLD AUTO: 77.3 %
PLATELET # BLD AUTO: 387 10(3)UL (ref 150–450)
RBC # BLD AUTO: 3.41 X10(6)UL
WBC # BLD AUTO: 11.9 X10(3) UL (ref 4–11)

## 2022-04-18 PROCEDURE — 85025 COMPLETE CBC W/AUTO DIFF WBC: CPT | Performed by: INTERNAL MEDICINE

## 2022-04-20 ENCOUNTER — OFFICE VISIT (OUTPATIENT)
Dept: HEMATOLOGY/ONCOLOGY | Facility: HOSPITAL | Age: 87
End: 2022-04-20
Attending: CLINICAL NURSE SPECIALIST
Payer: MEDICARE

## 2022-04-20 ENCOUNTER — SOCIAL WORK SERVICES (OUTPATIENT)
Dept: HEMATOLOGY/ONCOLOGY | Facility: HOSPITAL | Age: 87
End: 2022-04-20

## 2022-04-20 VITALS
HEART RATE: 101 BPM | DIASTOLIC BLOOD PRESSURE: 78 MMHG | SYSTOLIC BLOOD PRESSURE: 120 MMHG | OXYGEN SATURATION: 97 % | TEMPERATURE: 97 F | WEIGHT: 124.63 LBS | RESPIRATION RATE: 18 BRPM | BODY MASS INDEX: 17 KG/M2

## 2022-04-20 DIAGNOSIS — D64.9 TRANSFUSION-DEPENDENT ANEMIA: ICD-10-CM

## 2022-04-20 DIAGNOSIS — C90.00 MYELOMA (HCC): ICD-10-CM

## 2022-04-20 DIAGNOSIS — T80.89XA TRANSFUSION HEMOSIDEROSIS: ICD-10-CM

## 2022-04-20 DIAGNOSIS — C90.00 LIGHT CHAIN MYELOMA (HCC): ICD-10-CM

## 2022-04-20 DIAGNOSIS — D61.9: ICD-10-CM

## 2022-04-20 DIAGNOSIS — C34.90 LUNG CANCER (HCC): Primary | ICD-10-CM

## 2022-04-20 PROCEDURE — 99205 OFFICE O/P NEW HI 60 MIN: CPT | Performed by: INTERNAL MEDICINE

## 2022-04-20 NOTE — PROGRESS NOTES
.Outpatient Oncology Care Plan  Problem list:  knowledge deficit    Problems related to:    disease/disease progression    Interventions:  provided general teaching    Expected outcomes:  understands plan of care    Progress towards outcome:  resolved    Education Record    Learner:  Patient  Barriers / Limitations:  None  Method:  Reinforcement  Outcome:  Shows understanding  Comments:

## 2022-04-25 ENCOUNTER — LAB REQUISITION (OUTPATIENT)
Dept: LAB | Facility: HOSPITAL | Age: 87
End: 2022-04-25
Payer: MEDICARE

## 2022-04-25 LAB
BASOPHILS # BLD AUTO: 0.05 X10(3) UL (ref 0–0.2)
BASOPHILS NFR BLD AUTO: 0.4 %
EOSINOPHIL # BLD AUTO: 0.29 X10(3) UL (ref 0–0.7)
EOSINOPHIL NFR BLD AUTO: 2.4 %
ERYTHROCYTE [DISTWIDTH] IN BLOOD BY AUTOMATED COUNT: 19.1 %
HCT VFR BLD AUTO: 33.3 %
HGB BLD-MCNC: 10.4 G/DL
IMM GRANULOCYTES # BLD AUTO: 0.06 X10(3) UL (ref 0–1)
IMM GRANULOCYTES NFR BLD: 0.5 %
LYMPHOCYTES # BLD AUTO: 1.11 X10(3) UL (ref 1–4)
LYMPHOCYTES NFR BLD AUTO: 9.4 %
MCH RBC QN AUTO: 31 PG (ref 26–34)
MCHC RBC AUTO-ENTMCNC: 31.2 G/DL (ref 31–37)
MCV RBC AUTO: 99.1 FL
MONOCYTES # BLD AUTO: 0.97 X10(3) UL (ref 0.1–1)
MONOCYTES NFR BLD AUTO: 8.2 %
NEUTROPHILS # BLD AUTO: 9.39 X10 (3) UL (ref 1.5–7.7)
NEUTROPHILS # BLD AUTO: 9.39 X10(3) UL (ref 1.5–7.7)
NEUTROPHILS NFR BLD AUTO: 79.1 %
PLATELET # BLD AUTO: 382 10(3)UL (ref 150–450)
RBC # BLD AUTO: 3.36 X10(6)UL
WBC # BLD AUTO: 11.9 X10(3) UL (ref 4–11)

## 2022-04-25 PROCEDURE — 85025 COMPLETE CBC W/AUTO DIFF WBC: CPT | Performed by: INTERNAL MEDICINE

## 2022-04-26 ENCOUNTER — APPOINTMENT (OUTPATIENT)
Dept: HEMATOLOGY/ONCOLOGY | Facility: HOSPITAL | Age: 87
End: 2022-04-26
Attending: INTERNAL MEDICINE
Payer: MEDICARE

## 2022-05-02 ENCOUNTER — LAB REQUISITION (OUTPATIENT)
Dept: LAB | Facility: HOSPITAL | Age: 87
End: 2022-05-02
Payer: MEDICARE

## 2022-05-02 LAB
BASOPHILS # BLD AUTO: 0.06 X10(3) UL (ref 0–0.2)
BASOPHILS NFR BLD AUTO: 0.5 %
EOSINOPHIL # BLD AUTO: 0.19 X10(3) UL (ref 0–0.7)
EOSINOPHIL NFR BLD AUTO: 1.7 %
ERYTHROCYTE [DISTWIDTH] IN BLOOD BY AUTOMATED COUNT: 19.2 %
HCT VFR BLD AUTO: 32 %
HGB BLD-MCNC: 10 G/DL
IMM GRANULOCYTES # BLD AUTO: 0.04 X10(3) UL (ref 0–1)
IMM GRANULOCYTES NFR BLD: 0.4 %
LYMPHOCYTES # BLD AUTO: 0.95 X10(3) UL (ref 1–4)
LYMPHOCYTES NFR BLD AUTO: 8.7 %
MCH RBC QN AUTO: 30.4 PG (ref 26–34)
MCHC RBC AUTO-ENTMCNC: 31.3 G/DL (ref 31–37)
MCV RBC AUTO: 97.3 FL
MONOCYTES # BLD AUTO: 0.95 X10(3) UL (ref 0.1–1)
MONOCYTES NFR BLD AUTO: 8.7 %
NEUTROPHILS # BLD AUTO: 8.78 X10 (3) UL (ref 1.5–7.7)
NEUTROPHILS # BLD AUTO: 8.78 X10(3) UL (ref 1.5–7.7)
NEUTROPHILS NFR BLD AUTO: 80 %
PLATELET # BLD AUTO: 357 10(3)UL (ref 150–450)
RBC # BLD AUTO: 3.29 X10(6)UL
WBC # BLD AUTO: 11 X10(3) UL (ref 4–11)

## 2022-05-02 PROCEDURE — 85025 COMPLETE CBC W/AUTO DIFF WBC: CPT | Performed by: INTERNAL MEDICINE

## 2022-05-09 ENCOUNTER — LAB REQUISITION (OUTPATIENT)
Dept: LAB | Age: 87
End: 2022-05-09
Payer: MEDICARE

## 2022-05-09 ENCOUNTER — OFFICE VISIT (OUTPATIENT)
Dept: HEMATOLOGY/ONCOLOGY | Facility: HOSPITAL | Age: 87
End: 2022-05-09
Attending: CLINICAL NURSE SPECIALIST
Payer: MEDICARE

## 2022-05-09 VITALS
RESPIRATION RATE: 18 BRPM | HEART RATE: 89 BPM | SYSTOLIC BLOOD PRESSURE: 115 MMHG | TEMPERATURE: 98 F | WEIGHT: 124.38 LBS | BODY MASS INDEX: 17 KG/M2 | OXYGEN SATURATION: 99 % | DIASTOLIC BLOOD PRESSURE: 73 MMHG

## 2022-05-09 DIAGNOSIS — D64.9 TRANSFUSION-DEPENDENT ANEMIA: ICD-10-CM

## 2022-05-09 DIAGNOSIS — T80.89XA TRANSFUSION HEMOSIDEROSIS: ICD-10-CM

## 2022-05-09 DIAGNOSIS — C90.00 MULTIPLE MYELOMA NOT HAVING ACHIEVED REMISSION (HCC): ICD-10-CM

## 2022-05-09 DIAGNOSIS — C90.00 LIGHT CHAIN MYELOMA (HCC): ICD-10-CM

## 2022-05-09 DIAGNOSIS — C34.90 LUNG CANCER (HCC): Primary | ICD-10-CM

## 2022-05-09 DIAGNOSIS — C34.81 MALIGNANT NEOPLASM OF OVERLAPPING SITES OF RIGHT LUNG (HCC): Primary | ICD-10-CM

## 2022-05-09 DIAGNOSIS — D61.9: ICD-10-CM

## 2022-05-09 DIAGNOSIS — C90.00 MYELOMA (HCC): ICD-10-CM

## 2022-05-09 DIAGNOSIS — C61 MALIGNANT NEOPLASM OF PROSTATE (HCC): ICD-10-CM

## 2022-05-09 LAB
ALBUMIN SERPL-MCNC: 2.4 G/DL (ref 3.4–5)
ALBUMIN/GLOB SERPL: 0.4 {RATIO} (ref 1–2)
ALP LIVER SERPL-CCNC: 102 U/L
ALT SERPL-CCNC: 16 U/L
ANION GAP SERPL CALC-SCNC: 7 MMOL/L (ref 0–18)
AST SERPL-CCNC: 15 U/L (ref 15–37)
BASOPHILS # BLD AUTO: 0.06 X10(3) UL (ref 0–0.2)
BASOPHILS # BLD AUTO: 0.09 X10(3) UL (ref 0–0.2)
BASOPHILS NFR BLD AUTO: 0.5 %
BASOPHILS NFR BLD AUTO: 0.7 %
BILIRUB SERPL-MCNC: 0.7 MG/DL (ref 0.1–2)
BUN BLD-MCNC: 15 MG/DL (ref 7–18)
CALCIUM BLD-MCNC: 8.5 MG/DL (ref 8.5–10.1)
CEA SERPL-MCNC: 3.1 NG/ML (ref ?–5)
CHLORIDE SERPL-SCNC: 103 MMOL/L (ref 98–112)
CO2 SERPL-SCNC: 26 MMOL/L (ref 21–32)
CREAT BLD-MCNC: 0.86 MG/DL
DEPRECATED HBV CORE AB SER IA-ACNC: 1801.6 NG/ML
EOSINOPHIL # BLD AUTO: 0.29 X10(3) UL (ref 0–0.7)
EOSINOPHIL # BLD AUTO: 0.3 X10(3) UL (ref 0–0.7)
EOSINOPHIL NFR BLD AUTO: 2.4 %
EOSINOPHIL NFR BLD AUTO: 2.5 %
ERYTHROCYTE [DISTWIDTH] IN BLOOD BY AUTOMATED COUNT: 19.4 %
ERYTHROCYTE [DISTWIDTH] IN BLOOD BY AUTOMATED COUNT: 19.6 %
FASTING STATUS PATIENT QL REPORTED: NO
GLOBULIN PLAS-MCNC: 5.8 G/DL (ref 2.8–4.4)
GLUCOSE BLD-MCNC: 123 MG/DL (ref 70–99)
HCT VFR BLD AUTO: 31.2 %
HCT VFR BLD AUTO: 32.7 %
HGB BLD-MCNC: 9.7 G/DL
HGB BLD-MCNC: 9.9 G/DL
IGA SERPL-MCNC: 68.6 MG/DL (ref 70–312)
IGM SERPL-MCNC: 52.8 MG/DL (ref 43–279)
IMM GRANULOCYTES # BLD AUTO: 0.05 X10(3) UL (ref 0–1)
IMM GRANULOCYTES # BLD AUTO: 0.06 X10(3) UL (ref 0–1)
IMM GRANULOCYTES NFR BLD: 0.4 %
IMM GRANULOCYTES NFR BLD: 0.5 %
IMMUNOGLOBULIN PNL SER-MCNC: 2710 MG/DL (ref 791–1643)
IRON SERPL-MCNC: 24 UG/DL
LDH SERPL L TO P-CCNC: 102 U/L
LYMPHOCYTES # BLD AUTO: 0.97 X10(3) UL (ref 1–4)
LYMPHOCYTES # BLD AUTO: 1.04 X10(3) UL (ref 1–4)
LYMPHOCYTES NFR BLD AUTO: 8.4 %
LYMPHOCYTES NFR BLD AUTO: 8.5 %
MCH RBC QN AUTO: 29.7 PG (ref 26–34)
MCH RBC QN AUTO: 30.3 PG (ref 26–34)
MCHC RBC AUTO-ENTMCNC: 30.3 G/DL (ref 31–37)
MCHC RBC AUTO-ENTMCNC: 31.1 G/DL (ref 31–37)
MCV RBC AUTO: 97.5 FL
MCV RBC AUTO: 98.2 FL
MONOCYTES # BLD AUTO: 0.88 X10(3) UL (ref 0.1–1)
MONOCYTES # BLD AUTO: 0.92 X10(3) UL (ref 0.1–1)
MONOCYTES NFR BLD AUTO: 7.2 %
MONOCYTES NFR BLD AUTO: 8 %
NEUTROPHILS # BLD AUTO: 9.27 X10 (3) UL (ref 1.5–7.7)
NEUTROPHILS # BLD AUTO: 9.27 X10(3) UL (ref 1.5–7.7)
NEUTROPHILS # BLD AUTO: 9.93 X10 (3) UL (ref 1.5–7.7)
NEUTROPHILS # BLD AUTO: 9.93 X10(3) UL (ref 1.5–7.7)
NEUTROPHILS NFR BLD AUTO: 80.1 %
NEUTROPHILS NFR BLD AUTO: 80.8 %
OSMOLALITY SERPL CALC.SUM OF ELEC: 284 MOSM/KG (ref 275–295)
PLATELET # BLD AUTO: 429 10(3)UL (ref 150–450)
PLATELET # BLD AUTO: 432 10(3)UL (ref 150–450)
POTASSIUM SERPL-SCNC: 4.3 MMOL/L (ref 3.5–5.1)
PROT SERPL-MCNC: 8.2 G/DL (ref 6.4–8.2)
RBC # BLD AUTO: 3.2 X10(6)UL
RBC # BLD AUTO: 3.33 X10(6)UL
SODIUM SERPL-SCNC: 136 MMOL/L (ref 136–145)
WBC # BLD AUTO: 11.6 X10(3) UL (ref 4–11)
WBC # BLD AUTO: 12.3 X10(3) UL (ref 4–11)

## 2022-05-09 PROCEDURE — 84165 PROTEIN E-PHORESIS SERUM: CPT

## 2022-05-09 PROCEDURE — 83615 LACTATE (LD) (LDH) ENZYME: CPT

## 2022-05-09 PROCEDURE — 86334 IMMUNOFIX E-PHORESIS SERUM: CPT

## 2022-05-09 PROCEDURE — 99215 OFFICE O/P EST HI 40 MIN: CPT | Performed by: INTERNAL MEDICINE

## 2022-05-09 PROCEDURE — 82728 ASSAY OF FERRITIN: CPT

## 2022-05-09 PROCEDURE — 83540 ASSAY OF IRON: CPT

## 2022-05-09 PROCEDURE — 82784 ASSAY IGA/IGD/IGG/IGM EACH: CPT

## 2022-05-09 PROCEDURE — 85025 COMPLETE CBC W/AUTO DIFF WBC: CPT | Performed by: INTERNAL MEDICINE

## 2022-05-09 PROCEDURE — 83521 IG LIGHT CHAINS FREE EACH: CPT

## 2022-05-09 PROCEDURE — 82378 CARCINOEMBRYONIC ANTIGEN: CPT

## 2022-05-09 PROCEDURE — 80053 COMPREHEN METABOLIC PANEL: CPT

## 2022-05-09 PROCEDURE — 85025 COMPLETE CBC W/AUTO DIFF WBC: CPT

## 2022-05-11 LAB
ALBUMIN SERPL ELPH-MCNC: 3.17 G/DL (ref 3.75–5.21)
ALBUMIN/GLOB SERPL: 0.72 {RATIO} (ref 1–2)
ALPHA1 GLOB SERPL ELPH-MCNC: 0.52 G/DL (ref 0.19–0.46)
ALPHA2 GLOB SERPL ELPH-MCNC: 0.89 G/DL (ref 0.48–1.05)
B-GLOBULIN SERPL ELPH-MCNC: 0.53 G/DL (ref 0.68–1.23)
GAMMA GLOB SERPL ELPH-MCNC: 2.49 G/DL (ref 0.62–1.7)
KAPPA LC FREE SER-MCNC: 5.12 MG/DL (ref 0.33–1.94)
KAPPA LC FREE/LAMBDA FREE SER NEPH: 1.07 {RATIO} (ref 0.26–1.65)
LAMBDA LC FREE SERPL-MCNC: 4.78 MG/DL (ref 0.57–2.63)
M PROTEIN 1 SERPL ELPH-MCNC: 1.82 G/DL (ref ?–0)
PROT SERPL-MCNC: 7.6 G/DL (ref 6.4–8.2)

## 2022-05-16 ENCOUNTER — LAB REQUISITION (OUTPATIENT)
Dept: LAB | Facility: HOSPITAL | Age: 87
End: 2022-05-16
Payer: MEDICARE

## 2022-05-16 DIAGNOSIS — C34.11 MALIGNANT NEOPLASM OF UPPER LOBE, RIGHT BRONCHUS OR LUNG (HCC): ICD-10-CM

## 2022-05-16 LAB
BASOPHILS # BLD AUTO: 0.06 X10(3) UL (ref 0–0.2)
BASOPHILS NFR BLD AUTO: 0.4 %
EOSINOPHIL # BLD AUTO: 0.19 X10(3) UL (ref 0–0.7)
EOSINOPHIL NFR BLD AUTO: 1.4 %
ERYTHROCYTE [DISTWIDTH] IN BLOOD BY AUTOMATED COUNT: 19.8 %
HCT VFR BLD AUTO: 31.5 %
HGB BLD-MCNC: 9.8 G/DL
IMM GRANULOCYTES # BLD AUTO: 0.07 X10(3) UL (ref 0–1)
IMM GRANULOCYTES NFR BLD: 0.5 %
LYMPHOCYTES # BLD AUTO: 0.96 X10(3) UL (ref 1–4)
LYMPHOCYTES NFR BLD AUTO: 6.8 %
MCH RBC QN AUTO: 30.2 PG (ref 26–34)
MCHC RBC AUTO-ENTMCNC: 31.1 G/DL (ref 31–37)
MCV RBC AUTO: 96.9 FL
MONOCYTES # BLD AUTO: 1.14 X10(3) UL (ref 0.1–1)
MONOCYTES NFR BLD AUTO: 8.1 %
NEUTROPHILS # BLD AUTO: 11.65 X10 (3) UL (ref 1.5–7.7)
NEUTROPHILS # BLD AUTO: 11.65 X10(3) UL (ref 1.5–7.7)
NEUTROPHILS NFR BLD AUTO: 82.8 %
PLATELET # BLD AUTO: 428 10(3)UL (ref 150–450)
RBC # BLD AUTO: 3.25 X10(6)UL
WBC # BLD AUTO: 14.1 X10(3) UL (ref 4–11)

## 2022-05-16 PROCEDURE — 85025 COMPLETE CBC W/AUTO DIFF WBC: CPT | Performed by: INTERNAL MEDICINE

## 2022-05-19 ENCOUNTER — TELEPHONE (OUTPATIENT)
Dept: HEMATOLOGY/ONCOLOGY | Facility: HOSPITAL | Age: 87
End: 2022-05-19

## 2022-05-19 ENCOUNTER — HOSPITAL ENCOUNTER (OUTPATIENT)
Dept: NUCLEAR MEDICINE | Facility: HOSPITAL | Age: 87
Discharge: HOME | End: 2022-05-19
Attending: INTERNAL MEDICINE
Payer: MEDICARE

## 2022-05-19 DIAGNOSIS — C61 MALIGNANT NEOPLASM OF PROSTATE (HCC): ICD-10-CM

## 2022-05-19 DIAGNOSIS — C90.00 MULTIPLE MYELOMA NOT HAVING ACHIEVED REMISSION (HCC): ICD-10-CM

## 2022-05-19 DIAGNOSIS — C34.81 MALIGNANT NEOPLASM OF OVERLAPPING SITES OF RIGHT LUNG (HCC): ICD-10-CM

## 2022-05-19 LAB — GLUCOSE BLD-MCNC: 122 MG/DL (ref 70–99)

## 2022-05-19 PROCEDURE — 82962 GLUCOSE BLOOD TEST: CPT

## 2022-05-19 PROCEDURE — 78816 PET IMAGE W/CT FULL BODY: CPT | Performed by: INTERNAL MEDICINE

## 2022-05-19 NOTE — TELEPHONE ENCOUNTER
Left brief message regarding discussing test result. PET scan reported. Did not leave details but asked to call back if he wishes. No immediate intervention required. Dr. Ayan Lopez returns on 5/23/22.     Jessica FRANCOIS, ANP-BC, [address] [place] [address] [facility]

## 2022-05-23 ENCOUNTER — LAB REQUISITION (OUTPATIENT)
Dept: LAB | Facility: HOSPITAL | Age: 87
End: 2022-05-23
Payer: MEDICARE

## 2022-05-23 DIAGNOSIS — C34.90 MALIGNANT NEOPLASM OF UNSPECIFIED PART OF UNSPECIFIED BRONCHUS OR LUNG (HCC): ICD-10-CM

## 2022-05-23 LAB
BASOPHILS # BLD AUTO: 0.05 X10(3) UL (ref 0–0.2)
BASOPHILS NFR BLD AUTO: 0.4 %
EOSINOPHIL # BLD AUTO: 0.25 X10(3) UL (ref 0–0.7)
EOSINOPHIL NFR BLD AUTO: 1.8 %
ERYTHROCYTE [DISTWIDTH] IN BLOOD BY AUTOMATED COUNT: 20.5 %
HCT VFR BLD AUTO: 30.9 %
HGB BLD-MCNC: 9.3 G/DL
IMM GRANULOCYTES # BLD AUTO: 0.07 X10(3) UL (ref 0–1)
IMM GRANULOCYTES NFR BLD: 0.5 %
LYMPHOCYTES # BLD AUTO: 1.06 X10(3) UL (ref 1–4)
LYMPHOCYTES NFR BLD AUTO: 7.8 %
MCH RBC QN AUTO: 29.5 PG (ref 26–34)
MCHC RBC AUTO-ENTMCNC: 30.1 G/DL (ref 31–37)
MCV RBC AUTO: 98.1 FL
MONOCYTES # BLD AUTO: 1.01 X10(3) UL (ref 0.1–1)
MONOCYTES NFR BLD AUTO: 7.4 %
NEUTROPHILS # BLD AUTO: 11.18 X10 (3) UL (ref 1.5–7.7)
NEUTROPHILS # BLD AUTO: 11.18 X10(3) UL (ref 1.5–7.7)
NEUTROPHILS NFR BLD AUTO: 82.1 %
PLATELET # BLD AUTO: 403 10(3)UL (ref 150–450)
RBC # BLD AUTO: 3.15 X10(6)UL
WBC # BLD AUTO: 13.6 X10(3) UL (ref 4–11)

## 2022-05-23 PROCEDURE — 85025 COMPLETE CBC W/AUTO DIFF WBC: CPT | Performed by: INTERNAL MEDICINE

## 2022-05-24 ENCOUNTER — TELEPHONE (OUTPATIENT)
Dept: HEMATOLOGY/ONCOLOGY | Facility: HOSPITAL | Age: 87
End: 2022-05-24

## 2022-06-01 ENCOUNTER — APPOINTMENT (OUTPATIENT)
Dept: HEMATOLOGY/ONCOLOGY | Facility: HOSPITAL | Age: 87
End: 2022-06-01
Attending: CLINICAL NURSE SPECIALIST
Payer: MEDICARE

## 2022-06-01 ENCOUNTER — TELEPHONE (OUTPATIENT)
Dept: HEMATOLOGY/ONCOLOGY | Facility: HOSPITAL | Age: 87
End: 2022-06-01

## 2022-06-07 ENCOUNTER — LAB REQUISITION (OUTPATIENT)
Dept: LAB | Facility: HOSPITAL | Age: 87
End: 2022-06-07
Payer: MEDICARE

## 2022-06-07 DIAGNOSIS — C34.90 MALIGNANT NEOPLASM OF UNSPECIFIED PART OF UNSPECIFIED BRONCHUS OR LUNG (HCC): ICD-10-CM

## 2022-06-07 LAB
BASOPHILS # BLD AUTO: 0.06 X10(3) UL (ref 0–0.2)
BASOPHILS NFR BLD AUTO: 0.4 %
EOSINOPHIL # BLD AUTO: 0.31 X10(3) UL (ref 0–0.7)
EOSINOPHIL NFR BLD AUTO: 2.3 %
ERYTHROCYTE [DISTWIDTH] IN BLOOD BY AUTOMATED COUNT: 21.5 %
HCT VFR BLD AUTO: 32.6 %
HGB BLD-MCNC: 9.8 G/DL
IMM GRANULOCYTES # BLD AUTO: 0.06 X10(3) UL (ref 0–1)
IMM GRANULOCYTES NFR BLD: 0.4 %
LYMPHOCYTES # BLD AUTO: 1.28 X10(3) UL (ref 1–4)
LYMPHOCYTES NFR BLD AUTO: 9.4 %
MCH RBC QN AUTO: 29.8 PG (ref 26–34)
MCHC RBC AUTO-ENTMCNC: 30.1 G/DL (ref 31–37)
MCV RBC AUTO: 99.1 FL
MONOCYTES # BLD AUTO: 1.11 X10(3) UL (ref 0.1–1)
MONOCYTES NFR BLD AUTO: 8.1 %
NEUTROPHILS # BLD AUTO: 10.84 X10 (3) UL (ref 1.5–7.7)
NEUTROPHILS # BLD AUTO: 10.84 X10(3) UL (ref 1.5–7.7)
NEUTROPHILS NFR BLD AUTO: 79.4 %
PLATELET # BLD AUTO: 446 10(3)UL (ref 150–450)
RBC # BLD AUTO: 3.29 X10(6)UL
WBC # BLD AUTO: 13.7 X10(3) UL (ref 4–11)

## 2022-06-07 PROCEDURE — 85025 COMPLETE CBC W/AUTO DIFF WBC: CPT | Performed by: INTERNAL MEDICINE

## 2022-06-08 ENCOUNTER — OFFICE VISIT (OUTPATIENT)
Dept: HEMATOLOGY/ONCOLOGY | Facility: HOSPITAL | Age: 87
End: 2022-06-08
Attending: CLINICAL NURSE SPECIALIST
Payer: MEDICARE

## 2022-06-08 VITALS
HEIGHT: 72.01 IN | HEART RATE: 92 BPM | RESPIRATION RATE: 16 BRPM | OXYGEN SATURATION: 98 % | SYSTOLIC BLOOD PRESSURE: 111 MMHG | BODY MASS INDEX: 16.77 KG/M2 | DIASTOLIC BLOOD PRESSURE: 73 MMHG | TEMPERATURE: 97 F | WEIGHT: 123.81 LBS

## 2022-06-08 DIAGNOSIS — C90.00 MULTIPLE MYELOMA NOT HAVING ACHIEVED REMISSION (HCC): ICD-10-CM

## 2022-06-08 DIAGNOSIS — C78.2 PLEURAL METASTASIS (HCC): ICD-10-CM

## 2022-06-08 DIAGNOSIS — C34.31 MALIGNANT NEOPLASM OF LOWER LOBE OF RIGHT LUNG (HCC): Primary | ICD-10-CM

## 2022-06-08 DIAGNOSIS — T80.89XA TRANSFUSION HEMOSIDEROSIS: ICD-10-CM

## 2022-06-08 DIAGNOSIS — R59.9 ADENOPATHY: ICD-10-CM

## 2022-06-08 DIAGNOSIS — D61.01: ICD-10-CM

## 2022-06-08 DIAGNOSIS — D64.9 TRANSFUSION-DEPENDENT ANEMIA: ICD-10-CM

## 2022-06-08 LAB
ANTIBODY SCREEN: NEGATIVE
BASOPHILS # BLD AUTO: 0.06 X10(3) UL (ref 0–0.2)
BASOPHILS NFR BLD AUTO: 0.4 %
EOSINOPHIL # BLD AUTO: 0.29 X10(3) UL (ref 0–0.7)
EOSINOPHIL NFR BLD AUTO: 1.9 %
ERYTHROCYTE [DISTWIDTH] IN BLOOD BY AUTOMATED COUNT: 21.5 %
HCT VFR BLD AUTO: 34 %
HGB BLD-MCNC: 10.4 G/DL
IGA SERPL-MCNC: 81.6 MG/DL (ref 70–312)
IGM SERPL-MCNC: 64.7 MG/DL (ref 43–279)
IMM GRANULOCYTES # BLD AUTO: 0.05 X10(3) UL (ref 0–1)
IMM GRANULOCYTES NFR BLD: 0.3 %
IMMUNOGLOBULIN PNL SER-MCNC: 3020 MG/DL (ref 791–1643)
LYMPHOCYTES # BLD AUTO: 1.23 X10(3) UL (ref 1–4)
LYMPHOCYTES NFR BLD AUTO: 8 %
MCH RBC QN AUTO: 29.8 PG (ref 26–34)
MCHC RBC AUTO-ENTMCNC: 30.6 G/DL (ref 31–37)
MCV RBC AUTO: 97.4 FL
MONOCYTES # BLD AUTO: 1.1 X10(3) UL (ref 0.1–1)
MONOCYTES NFR BLD AUTO: 7.1 %
NEUTROPHILS # BLD AUTO: 12.68 X10 (3) UL (ref 1.5–7.7)
NEUTROPHILS # BLD AUTO: 12.68 X10(3) UL (ref 1.5–7.7)
NEUTROPHILS NFR BLD AUTO: 82.3 %
PLATELET # BLD AUTO: 465 10(3)UL (ref 150–450)
RBC # BLD AUTO: 3.49 X10(6)UL
RH BLOOD TYPE: NEGATIVE
WBC # BLD AUTO: 15.4 X10(3) UL (ref 4–11)

## 2022-06-08 PROCEDURE — 99215 OFFICE O/P EST HI 40 MIN: CPT | Performed by: INTERNAL MEDICINE

## 2022-06-10 LAB
ALBUMIN SERPL ELPH-MCNC: 3.3 G/DL (ref 3.75–5.21)
ALBUMIN/GLOB SERPL: 0.67 {RATIO} (ref 1–2)
ALPHA1 GLOB SERPL ELPH-MCNC: 0.55 G/DL (ref 0.19–0.46)
ALPHA2 GLOB SERPL ELPH-MCNC: 0.89 G/DL (ref 0.48–1.05)
B-GLOBULIN SERPL ELPH-MCNC: 0.56 G/DL (ref 0.68–1.23)
GAMMA GLOB SERPL ELPH-MCNC: 2.9 G/DL (ref 0.62–1.7)
KAPPA LC FREE SER-MCNC: 5.81 MG/DL (ref 0.33–1.94)
KAPPA LC FREE/LAMBDA FREE SER NEPH: 1.12 {RATIO} (ref 0.26–1.65)
LAMBDA LC FREE SERPL-MCNC: 5.17 MG/DL (ref 0.57–2.63)
M PROTEIN 1 SERPL ELPH-MCNC: 2.16 G/DL (ref ?–0)
PROT SERPL-MCNC: 8.2 G/DL (ref 6.4–8.2)

## 2022-06-13 ENCOUNTER — LAB REQUISITION (OUTPATIENT)
Dept: LAB | Facility: HOSPITAL | Age: 87
End: 2022-06-13
Payer: MEDICARE

## 2022-06-13 DIAGNOSIS — C34.11 MALIGNANT NEOPLASM OF UPPER LOBE, RIGHT BRONCHUS OR LUNG (HCC): ICD-10-CM

## 2022-06-13 LAB
BASOPHILS # BLD AUTO: 0.06 X10(3) UL (ref 0–0.2)
BASOPHILS NFR BLD AUTO: 0.5 %
EOSINOPHIL # BLD AUTO: 0.16 X10(3) UL (ref 0–0.7)
EOSINOPHIL NFR BLD AUTO: 1.4 %
ERYTHROCYTE [DISTWIDTH] IN BLOOD BY AUTOMATED COUNT: 21.4 %
HCT VFR BLD AUTO: 28.1 %
HGB BLD-MCNC: 8.8 G/DL
IMM GRANULOCYTES # BLD AUTO: 0.05 X10(3) UL (ref 0–1)
IMM GRANULOCYTES NFR BLD: 0.4 %
LYMPHOCYTES # BLD AUTO: 0.9 X10(3) UL (ref 1–4)
LYMPHOCYTES NFR BLD AUTO: 7.9 %
MCH RBC QN AUTO: 30 PG (ref 26–34)
MCHC RBC AUTO-ENTMCNC: 31.3 G/DL (ref 31–37)
MCV RBC AUTO: 95.9 FL
MONOCYTES # BLD AUTO: 1 X10(3) UL (ref 0.1–1)
MONOCYTES NFR BLD AUTO: 8.8 %
NEUTROPHILS # BLD AUTO: 9.22 X10 (3) UL (ref 1.5–7.7)
NEUTROPHILS # BLD AUTO: 9.22 X10(3) UL (ref 1.5–7.7)
NEUTROPHILS NFR BLD AUTO: 81 %
PLATELET # BLD AUTO: 360 10(3)UL (ref 150–450)
RBC # BLD AUTO: 2.93 X10(6)UL
WBC # BLD AUTO: 11.4 X10(3) UL (ref 4–11)

## 2022-06-13 PROCEDURE — 85025 COMPLETE CBC W/AUTO DIFF WBC: CPT | Performed by: INTERNAL MEDICINE

## 2022-06-20 ENCOUNTER — LAB REQUISITION (OUTPATIENT)
Dept: LAB | Facility: HOSPITAL | Age: 87
End: 2022-06-20
Payer: MEDICARE

## 2022-06-20 DIAGNOSIS — C34.11 MALIGNANT NEOPLASM OF UPPER LOBE, RIGHT BRONCHUS OR LUNG (HCC): ICD-10-CM

## 2022-06-20 LAB
BASOPHILS # BLD AUTO: 0.05 X10(3) UL (ref 0–0.2)
BASOPHILS NFR BLD AUTO: 0.4 %
EOSINOPHIL # BLD AUTO: 0.24 X10(3) UL (ref 0–0.7)
EOSINOPHIL NFR BLD AUTO: 1.7 %
ERYTHROCYTE [DISTWIDTH] IN BLOOD BY AUTOMATED COUNT: 21.7 %
HCT VFR BLD AUTO: 28.9 %
HGB BLD-MCNC: 8.9 G/DL
IMM GRANULOCYTES # BLD AUTO: 0.08 X10(3) UL (ref 0–1)
IMM GRANULOCYTES NFR BLD: 0.6 %
LYMPHOCYTES # BLD AUTO: 1.16 X10(3) UL (ref 1–4)
LYMPHOCYTES NFR BLD AUTO: 8.3 %
MCH RBC QN AUTO: 30.3 PG (ref 26–34)
MCHC RBC AUTO-ENTMCNC: 30.8 G/DL (ref 31–37)
MCV RBC AUTO: 98.3 FL
MONOCYTES # BLD AUTO: 1.07 X10(3) UL (ref 0.1–1)
MONOCYTES NFR BLD AUTO: 7.6 %
NEUTROPHILS # BLD AUTO: 11.39 X10 (3) UL (ref 1.5–7.7)
NEUTROPHILS # BLD AUTO: 11.39 X10(3) UL (ref 1.5–7.7)
NEUTROPHILS NFR BLD AUTO: 81.4 %
PLATELET # BLD AUTO: 351 10(3)UL (ref 150–450)
RBC # BLD AUTO: 2.94 X10(6)UL
WBC # BLD AUTO: 14 X10(3) UL (ref 4–11)

## 2022-06-20 PROCEDURE — 85025 COMPLETE CBC W/AUTO DIFF WBC: CPT | Performed by: INTERNAL MEDICINE

## 2022-06-21 NOTE — TELEPHONE ENCOUNTER
Patient is not feeling good. He's very weak almost unable to walk. He's shivering horribly. Please call Kirsten Granado.

## 2022-06-21 NOTE — TELEPHONE ENCOUNTER
Dr Melquiades Paniagua patient - Non Small Cell Lung Cancer/Multiple Myeloma not under going active treatment. Fever/Chills/Fatigue    Fever/Chills/Fatigue: Kirsten Torres reports the visiting nurse was there this morning to see Michelle Jett. He was well. He ate a good breakfast and drank 24oz of caffeine free fluids. He then laid down for a nap. He woke up about 40 minutes ago with shaking chills. They checked his temperature and it was 102 on his right temple, 99.9 on the left temple. They put blankets on him. His chills and fever resolved. His vital signs now are Temp 99.9, Pulse 102, RR 20 /64, Pulse Ox 97% on room air. Bijan Oconnor also reports that he is weaker than this morning. He was up walking with his can by himself this morning. Now needs assistance to stand up with his walker. Michelle Jett denies runny nose, post nasal drip, head congestion, chest congestion, sore throat, cough, body aches, nausea, vomiting, diarrhea, dysuria. No known sick contacts. No known Covid 19+ contacts. He did eat a good breakfast and has had 24oz of fluids.)    Bijan Oconnor said she is going to feed him lunch now and give him more caffeine free fluids. I asked her not to give him any tylenol or advil. If the shaking chills and fever return she will call 911 and have him evaluated in the ER.

## 2022-06-22 ENCOUNTER — TELEPHONE (OUTPATIENT)
Dept: HEMATOLOGY/ONCOLOGY | Facility: HOSPITAL | Age: 87
End: 2022-06-22

## 2022-06-22 NOTE — TELEPHONE ENCOUNTER
Pam Dela Cruz called with condition update for West. He is better today, he did not walk to BR last night but used urinal x 3, he had light breakfast this am at 830 of watermelon and protein drink, about to have a more complete breakfast now. Home PT in and worked with patient with walker, still weak but better. With Hgb of 8.9 she wonders about rechecking level or a visit? And is there any update on Bx results to determine tx plan. /60, 97.8, HR 85-89, PO 96% RA. Requesting a call back with plan.

## 2022-06-22 NOTE — TELEPHONE ENCOUNTER
Mag Hyman called a second time. She has been waiting for a call back from Dr Raphael Vang or her nurse today. I told her I would forward a second message.

## 2022-06-22 NOTE — TELEPHONE ENCOUNTER
Called the [facility] and went over [facility] results. No  mutation to target. Not a candidate for clinical trials with mutations he does have given other cancers he has/had. PDL-1 score <1  Has squamous cell carcinoma  Recommend ipi+nivo. Discussed rationale for this, how given and potential side effects. They verbalized understanding and would like to think things over and discuss with family whether to proceed.   Feeling better compared to yesterday and did not go to ED

## 2022-06-23 ENCOUNTER — TELEPHONE (OUTPATIENT)
Dept: HEMATOLOGY/ONCOLOGY | Facility: HOSPITAL | Age: 87
End: 2022-06-23

## 2022-06-23 NOTE — TELEPHONE ENCOUNTER
Lavonia Alpers called and said she spoke with Dr Ayan Lopez late yesterday about treatment options. The family wants to know if they can set up a conference call with Dr Ayan Lopez before they make a decision on treatment? Lavonia Alpers also said she feels Debra Espinoza is \"not bouncing back. \" They have a home health nurse coming on Mondays to draw his blood work. Lavonia Alpers wants to know if Dr Ayan Lopez could labs to be drawn tomorrow at home? Lavonia Alpers is asking for a call back this afternoon at [phone].

## 2022-06-24 ENCOUNTER — TELEPHONE (OUTPATIENT)
Dept: HEMATOLOGY/ONCOLOGY | Facility: HOSPITAL | Age: 87
End: 2022-06-24

## 2022-06-24 ENCOUNTER — LAB REQUISITION (OUTPATIENT)
Dept: LAB | Facility: HOSPITAL | Age: 87
End: 2022-06-24
Payer: MEDICARE

## 2022-06-24 DIAGNOSIS — C34.11 MALIGNANT NEOPLASM OF UPPER LOBE, RIGHT BRONCHUS OR LUNG (HCC): ICD-10-CM

## 2022-06-24 DIAGNOSIS — I13.0 HYPERTENSIVE HEART AND CHRONIC KIDNEY DISEASE WITH HEART FAILURE AND STAGE 1 THROUGH STAGE 4 CHRONIC KIDNEY DISEASE, OR UNSPECIFIED CHRONIC KIDNEY DISEASE (HCC): ICD-10-CM

## 2022-06-24 LAB
BASOPHILS # BLD AUTO: 0.04 X10(3) UL (ref 0–0.2)
BASOPHILS NFR BLD AUTO: 0.2 %
EOSINOPHIL # BLD AUTO: 0.06 X10(3) UL (ref 0–0.7)
EOSINOPHIL NFR BLD AUTO: 0.3 %
ERYTHROCYTE [DISTWIDTH] IN BLOOD BY AUTOMATED COUNT: 21.6 %
HCT VFR BLD AUTO: 27.1 %
HGB BLD-MCNC: 8.3 G/DL
IMM GRANULOCYTES # BLD AUTO: 0.11 X10(3) UL (ref 0–1)
IMM GRANULOCYTES NFR BLD: 0.6 %
LYMPHOCYTES # BLD AUTO: 0.82 X10(3) UL (ref 1–4)
LYMPHOCYTES NFR BLD AUTO: 4.4 %
MCH RBC QN AUTO: 29.7 PG (ref 26–34)
MCHC RBC AUTO-ENTMCNC: 30.6 G/DL (ref 31–37)
MCV RBC AUTO: 97.1 FL
MONOCYTES # BLD AUTO: 1.64 X10(3) UL (ref 0.1–1)
MONOCYTES NFR BLD AUTO: 8.8 %
NEUTROPHILS # BLD AUTO: 15.96 X10 (3) UL (ref 1.5–7.7)
NEUTROPHILS # BLD AUTO: 15.96 X10(3) UL (ref 1.5–7.7)
NEUTROPHILS NFR BLD AUTO: 85.7 %
PLATELET # BLD AUTO: 343 10(3)UL (ref 150–450)
RBC # BLD AUTO: 2.79 X10(6)UL
WBC # BLD AUTO: 18.6 X10(3) UL (ref 4–11)

## 2022-06-24 PROCEDURE — 85025 COMPLETE CBC W/AUTO DIFF WBC: CPT | Performed by: INTERNAL MEDICINE

## 2022-06-24 NOTE — TELEPHONE ENCOUNTER
Jude Parnell called to let Dr Reyna Vizcarra know that she can't see West's lab results from today in [address] She is asking that the results be released to My Chart. If there is a problem with Adeola she is reachable by phone.

## 2022-06-24 NOTE — TELEPHONE ENCOUNTER
I called Yeimi back. I explained that 98.7 is not a fever. A fever is 100.5 or higher. If Balbir Butler develops a fever over the weekend to please call the office. The answering service will answer. She needs to let them know Balbir Butler is not feeling well and to please page the doctor. Treva Crawley verbalized understanding. No additional questions or concerns at this time.

## 2022-06-24 NOTE — TELEPHONE ENCOUNTER
Patient's daughter called. He has low grade fever. 98.7. She's concerned about fever over the weekend and wants a call with instructions.

## 2022-06-27 ENCOUNTER — LAB REQUISITION (OUTPATIENT)
Dept: LAB | Facility: HOSPITAL | Age: 87
End: 2022-06-27
Payer: MEDICARE

## 2022-06-27 ENCOUNTER — TELEPHONE (OUTPATIENT)
Dept: HEMATOLOGY/ONCOLOGY | Facility: HOSPITAL | Age: 87
End: 2022-06-27

## 2022-06-27 DIAGNOSIS — C34.11 MALIGNANT NEOPLASM OF UPPER LOBE, RIGHT BRONCHUS OR LUNG (HCC): ICD-10-CM

## 2022-06-27 LAB
BASOPHILS # BLD AUTO: 0.05 X10(3) UL (ref 0–0.2)
BASOPHILS NFR BLD AUTO: 0.3 %
EOSINOPHIL # BLD AUTO: 0.08 X10(3) UL (ref 0–0.7)
EOSINOPHIL NFR BLD AUTO: 0.5 %
ERYTHROCYTE [DISTWIDTH] IN BLOOD BY AUTOMATED COUNT: 21.7 %
HCT VFR BLD AUTO: 26.5 %
HGB BLD-MCNC: 8.1 G/DL
IMM GRANULOCYTES # BLD AUTO: 0.09 X10(3) UL (ref 0–1)
IMM GRANULOCYTES NFR BLD: 0.6 %
LYMPHOCYTES # BLD AUTO: 0.77 X10(3) UL (ref 1–4)
LYMPHOCYTES NFR BLD AUTO: 5.1 %
MCH RBC QN AUTO: 29.7 PG (ref 26–34)
MCHC RBC AUTO-ENTMCNC: 30.6 G/DL (ref 31–37)
MCV RBC AUTO: 97.1 FL
MONOCYTES # BLD AUTO: 1.22 X10(3) UL (ref 0.1–1)
MONOCYTES NFR BLD AUTO: 8 %
NEUTROPHILS # BLD AUTO: 12.95 X10 (3) UL (ref 1.5–7.7)
NEUTROPHILS # BLD AUTO: 12.95 X10(3) UL (ref 1.5–7.7)
NEUTROPHILS NFR BLD AUTO: 85.5 %
PLATELET # BLD AUTO: 400 10(3)UL (ref 150–450)
RBC # BLD AUTO: 2.73 X10(6)UL
WBC # BLD AUTO: 15.2 X10(3) UL (ref 4–11)

## 2022-06-27 PROCEDURE — 85025 COMPLETE CBC W/AUTO DIFF WBC: CPT | Performed by: INTERNAL MEDICINE

## 2022-06-27 NOTE — TELEPHONE ENCOUNTER
Lesley Shetty called because she said Adeola said his cbc results are available. She can't see them. I let her know that his Hgb=8.1. Lesley Shetty said she believes Nedra Rhodes has an order to transfuse once his Hgb-8.0. She wants to know if Dr Moreno Guaman is going to order a transfusion? I told Lesley Shetty I would forward this call to Dr Moreno Guaman and her nurse. Lesley Shetty would like a call back.

## 2022-06-28 ENCOUNTER — OFFICE VISIT (OUTPATIENT)
Dept: HEMATOLOGY/ONCOLOGY | Facility: HOSPITAL | Age: 87
End: 2022-06-28
Attending: CLINICAL NURSE SPECIALIST
Payer: MEDICARE

## 2022-06-28 VITALS
HEART RATE: 90 BPM | TEMPERATURE: 96 F | SYSTOLIC BLOOD PRESSURE: 117 MMHG | DIASTOLIC BLOOD PRESSURE: 71 MMHG | RESPIRATION RATE: 18 BRPM | OXYGEN SATURATION: 97 %

## 2022-06-28 DIAGNOSIS — C34.31 MALIGNANT NEOPLASM OF LOWER LOBE OF RIGHT LUNG (HCC): Primary | ICD-10-CM

## 2022-06-28 DIAGNOSIS — D64.9 ANEMIA, UNSPECIFIED TYPE: Primary | ICD-10-CM

## 2022-06-28 DIAGNOSIS — C90.00 MULTIPLE MYELOMA NOT HAVING ACHIEVED REMISSION (HCC): ICD-10-CM

## 2022-06-28 DIAGNOSIS — D61.9 ANEMIA DUE TO BONE MARROW FAILURE, UNSPECIFIED BONE MARROW FAILURE TYPE (HCC): ICD-10-CM

## 2022-06-28 DIAGNOSIS — D64.9 ANEMIA, UNSPECIFIED TYPE: ICD-10-CM

## 2022-06-28 LAB
ANTIBODY SCREEN: NEGATIVE
RH BLOOD TYPE: NEGATIVE

## 2022-06-28 PROCEDURE — 36430 TRANSFUSION BLD/BLD COMPNT: CPT

## 2022-06-28 PROCEDURE — 99214 OFFICE O/P EST MOD 30 MIN: CPT | Performed by: NURSE PRACTITIONER

## 2022-06-28 PROCEDURE — 86901 BLOOD TYPING SEROLOGIC RH(D): CPT

## 2022-06-28 PROCEDURE — 86850 RBC ANTIBODY SCREEN: CPT

## 2022-06-28 PROCEDURE — 86920 COMPATIBILITY TEST SPIN: CPT

## 2022-06-28 PROCEDURE — 86900 BLOOD TYPING SEROLOGIC ABO: CPT

## 2022-06-28 RX ORDER — ACETAMINOPHEN 325 MG/1
650 TABLET ORAL ONCE
Status: COMPLETED | OUTPATIENT
Start: 2022-06-28 | End: 2022-06-28

## 2022-06-28 RX ORDER — ACETAMINOPHEN 325 MG/1
650 TABLET ORAL ONCE
Status: CANCELLED | OUTPATIENT
Start: 2022-06-28

## 2022-06-28 RX ORDER — DIPHENHYDRAMINE HCL 25 MG
25 CAPSULE ORAL ONCE
Status: CANCELLED | OUTPATIENT
Start: 2022-06-28

## 2022-06-28 RX ORDER — DIPHENHYDRAMINE HCL 25 MG
25 CAPSULE ORAL ONCE
Status: COMPLETED | OUTPATIENT
Start: 2022-06-28 | End: 2022-06-28

## 2022-06-28 RX ADMIN — DIPHENHYDRAMINE HCL 25 MG: 25 MG CAPSULE ORAL at 11:50:00

## 2022-06-28 RX ADMIN — ACETAMINOPHEN 650 MG: 325 TABLET ORAL at 11:50:00

## 2022-06-28 NOTE — PROGRESS NOTES
Education Record    Learner:  Patient, Spouse and Other:  care taker    Disease / Diagnosis: Multiple Myeloma    Barriers / Limitations:  None   Comments:    Method:  Brief focused   Comments:    General Topics:  Procedure, Plan of care reviewed and Fall risk and prevention   Comments:    Outcome:  Shows understanding   Comments:

## 2022-06-28 NOTE — TELEPHONE ENCOUNTER
I returned Rosalie's call. Poornima Alves said he does feel \"a little weaker\" than yesterday. He is able to stand up and walk with stand by assist. No dyspnea at rest. He is having dyspnea after walking from the bedroom to the bathroom. Pulse ox now is 94% on room air. He denies chest pain or chest pressure. No chills, shakes, fever, nausea, vomiting or diarrhea. He is eating small meals and drinking at least 64oz of caffeine free fluids daily. He denies feeling light headed or dizzy when going from sitting to standing. I told Don Sport that Dr Jeff Glez will be in the office shortly. I will forward this assessment to her. If they don't feel they can wait for her to review my note, Randolph Mustafa can bring Poornima Alves to the [facility] ER. They both said they don't feel that is necessary at this time. They will await a call back from Dr Kyree Sheldon nurse.

## 2022-06-28 NOTE — TELEPHONE ENCOUNTER
Andre Kelley is calling because the patient's Hemoglobin has dropped down 8.1 and this is making him unsteady and weak, Dr. Tiffany Aponte pt

## 2022-06-28 NOTE — TELEPHONE ENCOUNTER
I called Andre Kelley and updated her that Dr Tiffany Aponte wants Tigreaddison Pierce to come for an ACV & blood transfusion. I scheduled him at 10:30 am with Jarvis Vasquez. Gini Thorpe RN [facility] ok'd 11 am for a blood transfusion.

## 2022-06-29 ENCOUNTER — TELEPHONE (OUTPATIENT)
Dept: HEMATOLOGY/ONCOLOGY | Facility: HOSPITAL | Age: 87
End: 2022-06-29

## 2022-06-29 LAB — BLOOD TYPE BARCODE: 9500

## 2022-06-29 NOTE — TELEPHONE ENCOUNTER
Pedro Farah is calling to find out how is the virtual phone call will be handled with all 8 of her family members, half of them are out of state. Pedro Farah is asking if Dr. Jessica Vasquez will be calling from an I-phone and how will all 8 people be able to be conference into the call. The IT Help desk was on the call and trying to help Pedro Farah figure out a solution to this virtual phone. Pedro Farah stated that she do know that up to 5 people can be connected together. The virtual phone call is on 6/30/22 at 300 PM. Pedro Farah need to speak to the RN today so that they can be prepared for this call from Dr. Jessica Vasquez regarding the patient.

## 2022-06-30 ENCOUNTER — VIRTUAL PHONE E/M (OUTPATIENT)
Dept: HEMATOLOGY/ONCOLOGY | Facility: HOSPITAL | Age: 87
End: 2022-06-30
Attending: CLINICAL NURSE SPECIALIST
Payer: MEDICARE

## 2022-06-30 DIAGNOSIS — T80.89XA TRANSFUSION HEMOSIDEROSIS: ICD-10-CM

## 2022-06-30 DIAGNOSIS — R59.9 ADENOPATHY: ICD-10-CM

## 2022-06-30 DIAGNOSIS — C78.2 PLEURAL METASTASIS (HCC): ICD-10-CM

## 2022-06-30 DIAGNOSIS — D61.01: ICD-10-CM

## 2022-06-30 DIAGNOSIS — D64.9 TRANSFUSION-DEPENDENT ANEMIA: ICD-10-CM

## 2022-06-30 DIAGNOSIS — C90.00 MULTIPLE MYELOMA NOT HAVING ACHIEVED REMISSION (HCC): Primary | ICD-10-CM

## 2022-06-30 DIAGNOSIS — D61.9 ANEMIA DUE TO BONE MARROW FAILURE, UNSPECIFIED BONE MARROW FAILURE TYPE (HCC): ICD-10-CM

## 2022-06-30 DIAGNOSIS — C34.31 MALIGNANT NEOPLASM OF LOWER LOBE OF RIGHT LUNG (HCC): ICD-10-CM

## 2022-06-30 PROCEDURE — 99215 OFFICE O/P EST HI 40 MIN: CPT | Performed by: INTERNAL MEDICINE

## 2022-06-30 NOTE — TELEPHONE ENCOUNTER
Received an overnight message from Akosua Galarza to call [phone] for the 3:00pm appointment today.

## 2022-06-30 NOTE — TELEPHONE ENCOUNTER
Phuc Naranjo called to confirm the phone appointment with Dr. Gladys Boyce. Ronnie Love Quick call [phone].

## 2022-07-01 ENCOUNTER — TELEPHONE (OUTPATIENT)
Dept: HEMATOLOGY/ONCOLOGY | Facility: HOSPITAL | Age: 87
End: 2022-07-01

## 2022-07-01 NOTE — TELEPHONE ENCOUNTER
Patient's wife called to schedule an appointment with Charissa/dietician. Please call patient to schedule. Thank you.

## 2022-07-01 NOTE — TELEPHONE ENCOUNTER
Dr. Jessica Vasquez made a comment to the family yesterday  (on 6/30/22) saying that their father had stage 4 cancer. Please ask Dr. Jessica Vasquez to call Justin Boyd, patient's wife as the last time patient and wife spoke to Dr. Jessica Vasquez she said it was stage one. The family is concerned. Yeimi's phone number is [phone]. Thank you.

## 2022-07-05 ENCOUNTER — LAB REQUISITION (OUTPATIENT)
Dept: LAB | Facility: HOSPITAL | Age: 87
End: 2022-07-05
Payer: MEDICARE

## 2022-07-05 ENCOUNTER — TELEPHONE (OUTPATIENT)
Dept: HEMATOLOGY/ONCOLOGY | Facility: HOSPITAL | Age: 87
End: 2022-07-05

## 2022-07-05 DIAGNOSIS — C34.11 MALIGNANT NEOPLASM OF UPPER LOBE, RIGHT BRONCHUS OR LUNG (HCC): ICD-10-CM

## 2022-07-05 LAB
BASOPHILS # BLD AUTO: 0.05 X10(3) UL (ref 0–0.2)
BASOPHILS NFR BLD AUTO: 0.3 %
EOSINOPHIL # BLD AUTO: 0.14 X10(3) UL (ref 0–0.7)
EOSINOPHIL NFR BLD AUTO: 1 %
ERYTHROCYTE [DISTWIDTH] IN BLOOD BY AUTOMATED COUNT: 20 %
HCT VFR BLD AUTO: 29.9 %
HGB BLD-MCNC: 9.2 G/DL
IMM GRANULOCYTES # BLD AUTO: 0.07 X10(3) UL (ref 0–1)
IMM GRANULOCYTES NFR BLD: 0.5 %
LYMPHOCYTES # BLD AUTO: 1.1 X10(3) UL (ref 1–4)
LYMPHOCYTES NFR BLD AUTO: 7.7 %
MCH RBC QN AUTO: 29.5 PG (ref 26–34)
MCHC RBC AUTO-ENTMCNC: 30.8 G/DL (ref 31–37)
MCV RBC AUTO: 95.8 FL
MONOCYTES # BLD AUTO: 1.01 X10(3) UL (ref 0.1–1)
MONOCYTES NFR BLD AUTO: 7.1 %
NEUTROPHILS # BLD AUTO: 11.94 X10 (3) UL (ref 1.5–7.7)
NEUTROPHILS # BLD AUTO: 11.94 X10(3) UL (ref 1.5–7.7)
NEUTROPHILS NFR BLD AUTO: 83.4 %
PLATELET # BLD AUTO: 504 10(3)UL (ref 150–450)
RBC # BLD AUTO: 3.12 X10(6)UL
WBC # BLD AUTO: 14.3 X10(3) UL (ref 4–11)

## 2022-07-05 PROCEDURE — 85025 COMPLETE CBC W/AUTO DIFF WBC: CPT | Performed by: INTERNAL MEDICINE

## 2022-07-05 NOTE — TELEPHONE ENCOUNTER
Patient exposed to couple of people with symptoms of Covid last Firday(not clear if tested positive or not), he does not have any symptoms yet. They were wondering about PCR testing? Instructed to monitor for symptoms and call PCP for testing if needed. Looking for advice from MD if there is anything else they should be doing.

## 2022-07-06 ENCOUNTER — TELEPHONE (OUTPATIENT)
Dept: HEMATOLOGY/ONCOLOGY | Facility: HOSPITAL | Age: 87
End: 2022-07-06

## 2022-07-11 ENCOUNTER — TELEPHONE (OUTPATIENT)
Dept: HEMATOLOGY/ONCOLOGY | Facility: HOSPITAL | Age: 87
End: 2022-07-11

## 2022-07-11 ENCOUNTER — LAB REQUISITION (OUTPATIENT)
Dept: LAB | Facility: HOSPITAL | Age: 87
End: 2022-07-11
Payer: MEDICARE

## 2022-07-11 DIAGNOSIS — C90.00 MULTIPLE MYELOMA NOT HAVING ACHIEVED REMISSION (HCC): ICD-10-CM

## 2022-07-11 DIAGNOSIS — C34.11 MALIGNANT NEOPLASM OF UPPER LOBE, RIGHT BRONCHUS OR LUNG (HCC): ICD-10-CM

## 2022-07-11 PROCEDURE — 85025 COMPLETE CBC W/AUTO DIFF WBC: CPT | Performed by: INTERNAL MEDICINE

## 2022-07-11 NOTE — TELEPHONE ENCOUNTER
Spouse called with patient, he is fatigued, wondering about him being dehydrated,  Denies fevers, no sob or chest pain, had some nausea this am but was able to eat-banana, blueberries, granola, yogart and hold it down, he is drinking. No vomiting or diarrhea. Denies lightheadedness, no urinary or bowel complaints, last BM today. Recently tested Covid negative no symptoms of sore throat or cough or h/a.  bp 109/64, Hr ranges from  in a day(had recently been instructed to consult Cardiology), PO 91 to 96%. Encouraged to keep pushing fluids, small frequent meals, monitor VS and call if changes or concerns.

## 2022-07-12 ENCOUNTER — LAB REQUISITION (OUTPATIENT)
Dept: LAB | Facility: HOSPITAL | Age: 87
End: 2022-07-12
Payer: MEDICARE

## 2022-07-12 DIAGNOSIS — C90.00 MULTIPLE MYELOMA NOT HAVING ACHIEVED REMISSION (HCC): ICD-10-CM

## 2022-07-12 LAB
BASOPHILS # BLD AUTO: 0.04 X10(3) UL (ref 0–0.2)
BASOPHILS NFR BLD AUTO: 0.3 %
DEPRECATED RDW RBC AUTO: 70.4 FL (ref 35.1–46.3)
EOSINOPHIL # BLD AUTO: 0.1 X10(3) UL (ref 0–0.7)
EOSINOPHIL NFR BLD AUTO: 0.7 %
ERYTHROCYTE [DISTWIDTH] IN BLOOD BY AUTOMATED COUNT: 19.9 % (ref 11–15)
HCT VFR BLD AUTO: 29.9 %
HGB BLD-MCNC: 9 G/DL
IMM GRANULOCYTES # BLD AUTO: 0.07 X10(3) UL (ref 0–1)
IMM GRANULOCYTES NFR BLD: 0.5 %
LYMPHOCYTES # BLD AUTO: 0.77 X10(3) UL (ref 1–4)
LYMPHOCYTES NFR BLD AUTO: 5.5 %
MCH RBC QN AUTO: 29 PG (ref 26–34)
MCHC RBC AUTO-ENTMCNC: 30.1 G/DL (ref 31–37)
MCV RBC AUTO: 96.5 FL
MONOCYTES # BLD AUTO: 1.19 X10(3) UL (ref 0.1–1)
MONOCYTES NFR BLD AUTO: 8.5 %
NEUTROPHILS # BLD AUTO: 11.87 X10 (3) UL (ref 1.5–7.7)
NEUTROPHILS # BLD AUTO: 11.87 X10(3) UL (ref 1.5–7.7)
NEUTROPHILS NFR BLD AUTO: 84.5 %
PLATELET # BLD AUTO: 433 10(3)UL (ref 150–450)
PLATELET MORPHOLOGY: NORMAL
RBC # BLD AUTO: 3.1 X10(6)UL
WBC # BLD AUTO: 14 X10(3) UL (ref 4–11)

## 2022-07-12 PROCEDURE — 85025 COMPLETE CBC W/AUTO DIFF WBC: CPT | Performed by: INTERNAL MEDICINE

## 2022-07-12 NOTE — TELEPHONE ENCOUNTER
Kofi Rodriguez from [place] [address] called. She needs to know if Dr. Raphael Vang would take over the orders for home health? Please call when able. Thank you.

## 2022-07-18 ENCOUNTER — LAB REQUISITION (OUTPATIENT)
Dept: LAB | Facility: HOSPITAL | Age: 87
End: 2022-07-18
Payer: MEDICARE

## 2022-07-18 DIAGNOSIS — C34.11 MALIGNANT NEOPLASM OF UPPER LOBE, RIGHT BRONCHUS OR LUNG (HCC): ICD-10-CM

## 2022-07-18 DIAGNOSIS — C90.00 MULTIPLE MYELOMA NOT HAVING ACHIEVED REMISSION (HCC): ICD-10-CM

## 2022-07-18 LAB
BASOPHILS # BLD AUTO: 0.04 X10(3) UL (ref 0–0.2)
BASOPHILS NFR BLD AUTO: 0.3 %
EOSINOPHIL # BLD AUTO: 0.08 X10(3) UL (ref 0–0.7)
EOSINOPHIL NFR BLD AUTO: 0.6 %
ERYTHROCYTE [DISTWIDTH] IN BLOOD BY AUTOMATED COUNT: 20.1 %
HCT VFR BLD AUTO: 29.5 %
HGB BLD-MCNC: 9 G/DL
IMM GRANULOCYTES # BLD AUTO: 0.09 X10(3) UL (ref 0–1)
IMM GRANULOCYTES NFR BLD: 0.6 %
LYMPHOCYTES # BLD AUTO: 0.98 X10(3) UL (ref 1–4)
LYMPHOCYTES NFR BLD AUTO: 6.9 %
MCH RBC QN AUTO: 29.3 PG (ref 26–34)
MCHC RBC AUTO-ENTMCNC: 30.5 G/DL (ref 31–37)
MCV RBC AUTO: 96.1 FL
MONOCYTES # BLD AUTO: 1.16 X10(3) UL (ref 0.1–1)
MONOCYTES NFR BLD AUTO: 8.1 %
NEUTROPHILS # BLD AUTO: 11.94 X10 (3) UL (ref 1.5–7.7)
NEUTROPHILS # BLD AUTO: 11.94 X10(3) UL (ref 1.5–7.7)
NEUTROPHILS NFR BLD AUTO: 83.5 %
PLATELET # BLD AUTO: 415 10(3)UL (ref 150–450)
RBC # BLD AUTO: 3.07 X10(6)UL
WBC # BLD AUTO: 14.3 X10(3) UL (ref 4–11)

## 2022-07-18 PROCEDURE — 85025 COMPLETE CBC W/AUTO DIFF WBC: CPT | Performed by: INTERNAL MEDICINE

## 2022-07-25 ENCOUNTER — LAB REQUISITION (OUTPATIENT)
Dept: LAB | Facility: HOSPITAL | Age: 87
End: 2022-07-25
Payer: MEDICARE

## 2022-07-25 ENCOUNTER — TELEPHONE (OUTPATIENT)
Dept: HEMATOLOGY/ONCOLOGY | Facility: HOSPITAL | Age: 87
End: 2022-07-25

## 2022-07-25 DIAGNOSIS — C34.11 MALIGNANT NEOPLASM OF UPPER LOBE, RIGHT BRONCHUS OR LUNG (HCC): ICD-10-CM

## 2022-07-25 DIAGNOSIS — C90.00 MULTIPLE MYELOMA NOT HAVING ACHIEVED REMISSION (HCC): ICD-10-CM

## 2022-07-25 LAB
BASOPHILS # BLD AUTO: 0.04 X10(3) UL (ref 0–0.2)
BASOPHILS NFR BLD AUTO: 0.3 %
EOSINOPHIL # BLD AUTO: 0.09 X10(3) UL (ref 0–0.7)
EOSINOPHIL NFR BLD AUTO: 0.6 %
ERYTHROCYTE [DISTWIDTH] IN BLOOD BY AUTOMATED COUNT: 20.1 %
HCT VFR BLD AUTO: 29 %
HGB BLD-MCNC: 9 G/DL
IMM GRANULOCYTES # BLD AUTO: 0.1 X10(3) UL (ref 0–1)
IMM GRANULOCYTES NFR BLD: 0.7 %
LYMPHOCYTES # BLD AUTO: 0.85 X10(3) UL (ref 1–4)
LYMPHOCYTES NFR BLD AUTO: 5.7 %
MCH RBC QN AUTO: 28.9 PG (ref 26–34)
MCHC RBC AUTO-ENTMCNC: 31 G/DL (ref 31–37)
MCV RBC AUTO: 93.2 FL
MONOCYTES # BLD AUTO: 1.2 X10(3) UL (ref 0.1–1)
MONOCYTES NFR BLD AUTO: 8 %
NEUTROPHILS # BLD AUTO: 12.63 X10 (3) UL (ref 1.5–7.7)
NEUTROPHILS # BLD AUTO: 12.63 X10(3) UL (ref 1.5–7.7)
NEUTROPHILS NFR BLD AUTO: 84.7 %
PLATELET # BLD AUTO: 453 10(3)UL (ref 150–450)
RBC # BLD AUTO: 3.11 X10(6)UL
WBC # BLD AUTO: 14.9 X10(3) UL (ref 4–11)

## 2022-07-25 PROCEDURE — 85025 COMPLETE CBC W/AUTO DIFF WBC: CPT | Performed by: INTERNAL MEDICINE

## 2022-07-25 NOTE — TELEPHONE ENCOUNTER
[facility] called. The patient was reevaluated for pt. He will start pt treatment this week.

## 2022-08-01 ENCOUNTER — LAB REQUISITION (OUTPATIENT)
Dept: LAB | Facility: HOSPITAL | Age: 87
End: 2022-08-01
Payer: MEDICARE

## 2022-08-01 ENCOUNTER — TELEPHONE (OUTPATIENT)
Dept: HEMATOLOGY/ONCOLOGY | Facility: HOSPITAL | Age: 87
End: 2022-08-01

## 2022-08-01 DIAGNOSIS — C34.11 MALIGNANT NEOPLASM OF UPPER LOBE, RIGHT BRONCHUS OR LUNG (HCC): ICD-10-CM

## 2022-08-01 LAB
BASOPHILS # BLD AUTO: 0.05 X10(3) UL (ref 0–0.2)
BASOPHILS NFR BLD AUTO: 0.4 %
EOSINOPHIL # BLD AUTO: 0.11 X10(3) UL (ref 0–0.7)
EOSINOPHIL NFR BLD AUTO: 0.8 %
ERYTHROCYTE [DISTWIDTH] IN BLOOD BY AUTOMATED COUNT: 20.4 %
HCT VFR BLD AUTO: 26.4 %
HGB BLD-MCNC: 8.3 G/DL
IMM GRANULOCYTES # BLD AUTO: 0.06 X10(3) UL (ref 0–1)
IMM GRANULOCYTES NFR BLD: 0.4 %
LYMPHOCYTES # BLD AUTO: 0.99 X10(3) UL (ref 1–4)
LYMPHOCYTES NFR BLD AUTO: 7.3 %
MCH RBC QN AUTO: 28.5 PG (ref 26–34)
MCHC RBC AUTO-ENTMCNC: 31.4 G/DL (ref 31–37)
MCV RBC AUTO: 90.7 FL
MONOCYTES # BLD AUTO: 1.05 X10(3) UL (ref 0.1–1)
MONOCYTES NFR BLD AUTO: 7.8 %
NEUTROPHILS # BLD AUTO: 11.22 X10 (3) UL (ref 1.5–7.7)
NEUTROPHILS # BLD AUTO: 11.22 X10(3) UL (ref 1.5–7.7)
NEUTROPHILS NFR BLD AUTO: 83.3 %
PLATELET # BLD AUTO: 408 10(3)UL (ref 150–450)
RBC # BLD AUTO: 2.91 X10(6)UL
WBC # BLD AUTO: 13.5 X10(3) UL (ref 4–11)

## 2022-08-01 PROCEDURE — 85025 COMPLETE CBC W/AUTO DIFF WBC: CPT | Performed by: INTERNAL MEDICINE

## 2022-08-01 NOTE — TELEPHONE ENCOUNTER
Patient's wife called and said the patient's hemoglobin was above 8 today. Please call when able.   Thank you

## 2022-08-02 ENCOUNTER — TELEPHONE (OUTPATIENT)
Dept: HEMATOLOGY/ONCOLOGY | Facility: HOSPITAL | Age: 87
End: 2022-08-02

## 2022-08-02 NOTE — TELEPHONE ENCOUNTER
Rocky Navarro called to let Dr Jose R Saul know that Meena Gross was not feeling well. He has a cough. She denies other cold/flu/Covid 19 symptoms. Rocky Stephduke did an at home Covid 19 test and it was positive. They are now at [facility] for PCR testing. They are waiting for the results. The doctor offered him monoclonal antibodies, if his test comes back positive. Rocky Navarro wanted to make sure it was ok with Dr Jose R Saul. I attempted to reach Dr Toni Hayward nurse. I then spoke with Mannie Wang. She said he may have the monoclonal antibody infusion. She asked me to update Dr Jose R Saul. I updated Rocky Navarro. She verbalized understading. I explained that we can't see Duly notes and to please call us back to let us know if Meena Gross is Covid 19 + and if he did receive the monoclonal antibody infusion. She agreed. I also asked her to also please update West's PCP.

## 2022-08-02 NOTE — TELEPHONE ENCOUNTER
Please call patient's wife, Bijan Oconnor. She is questioning his hemoglobin count. She also said the patient has Covid.

## 2022-08-04 ENCOUNTER — TELEPHONE (OUTPATIENT)
Dept: HEMATOLOGY/ONCOLOGY | Facility: HOSPITAL | Age: 87
End: 2022-08-04

## 2022-08-04 ENCOUNTER — LAB REQUISITION (OUTPATIENT)
Dept: LAB | Facility: HOSPITAL | Age: 87
End: 2022-08-04
Payer: MEDICARE

## 2022-08-04 DIAGNOSIS — D63.0 ANEMIA IN NEOPLASTIC DISEASE: ICD-10-CM

## 2022-08-04 LAB
BASOPHILS # BLD AUTO: 0.05 X10(3) UL (ref 0–0.2)
BASOPHILS NFR BLD AUTO: 0.3 %
EOSINOPHIL # BLD AUTO: 0.08 X10(3) UL (ref 0–0.7)
EOSINOPHIL NFR BLD AUTO: 0.5 %
ERYTHROCYTE [DISTWIDTH] IN BLOOD BY AUTOMATED COUNT: 20.9 %
HCT VFR BLD AUTO: 30.7 %
HGB BLD-MCNC: 9.2 G/DL
IMM GRANULOCYTES # BLD AUTO: 0.09 X10(3) UL (ref 0–1)
IMM GRANULOCYTES NFR BLD: 0.6 %
LYMPHOCYTES # BLD AUTO: 0.91 X10(3) UL (ref 1–4)
LYMPHOCYTES NFR BLD AUTO: 6.1 %
MCH RBC QN AUTO: 28.5 PG (ref 26–34)
MCHC RBC AUTO-ENTMCNC: 30 G/DL (ref 31–37)
MCV RBC AUTO: 95 FL
MONOCYTES # BLD AUTO: 0.93 X10(3) UL (ref 0.1–1)
MONOCYTES NFR BLD AUTO: 6.2 %
NEUTROPHILS # BLD AUTO: 12.97 X10 (3) UL (ref 1.5–7.7)
NEUTROPHILS # BLD AUTO: 12.97 X10(3) UL (ref 1.5–7.7)
NEUTROPHILS NFR BLD AUTO: 86.3 %
PLATELET # BLD AUTO: 401 10(3)UL (ref 150–450)
RBC # BLD AUTO: 3.23 X10(6)UL
WBC # BLD AUTO: 15 X10(3) UL (ref 4–11)

## 2022-08-04 PROCEDURE — 85025 COMPLETE CBC W/AUTO DIFF WBC: CPT | Performed by: INTERNAL MEDICINE

## 2022-08-04 NOTE — TELEPHONE ENCOUNTER
Theresa Bravo called back, she had not mentioned in previous call that Faiza Nolan had tested at home Covid positive on Tuesday, so when she spoke to [facility] health regarding CBC for today they told her they could not come out a special Covid Nurse will need to come to do CBC, he is expected some time today, however when at Immediate getting Covid testing she is going to ask if they can draw the CBC to save some time and will call office if needed or if it can be done that way or wait for Home RN.

## 2022-08-04 NOTE — TELEPHONE ENCOUNTER
Spouse called she was wondering about labs needing to be done today for low Hgb before the weekend, per note [facility] was notified for CBC today she will call them to find out. Also she is having some cold symptoms and concerned she has Covid so both are going to Covid testing today, instructed to contact office as soon as results available. Ruby Castellanos

## 2022-08-04 NOTE — TELEPHONE ENCOUNTER
Lindy Currie called back, she and Marcel Garg are at [facility]mediate care now getting Antibody infusion for Covid. She was positive and with symptoms he at this time is negative but is getting infusion. He is having a blood draw at 4pm today CBC for possible transfusion please monitor for results and notify her. Also she wonders if they could skip the Cardiology apt on Monday as they have been through a lot this week and need a break. Please call [phone] with plan.

## 2022-08-05 ENCOUNTER — TELEPHONE (OUTPATIENT)
Dept: HEMATOLOGY/ONCOLOGY | Facility: HOSPITAL | Age: 87
End: 2022-08-05

## 2022-08-08 ENCOUNTER — TELEPHONE (OUTPATIENT)
Dept: HEMATOLOGY/ONCOLOGY | Facility: HOSPITAL | Age: 87
End: 2022-08-08

## 2022-08-08 NOTE — TELEPHONE ENCOUNTER
Kurtis Delong called back and asked to speak with Kristel Sadler, Triage RN. I told Laurie Maier would have Kristel Sadler, Triage RN call her back.

## 2022-08-08 NOTE — TELEPHONE ENCOUNTER
Delayne Mode, patient was not able to give a new sputum or testing and they would not take one she had gotten for West, also did not get abx from pulm so will call pharmacy to see if one is there if not call Pulm office for information on abx.

## 2022-08-08 NOTE — TELEPHONE ENCOUNTER
WIFE calling says jaime has been coughing 3-4 days  spitting fleam yellow in color she has saved some to bring into lab.  That's per pulmonologist dr Jovani Cortés

## 2022-08-08 NOTE — TELEPHONE ENCOUNTER
Returned call to wife, she wanted to let office know that Meena Gross has been having coughing for 2-3 days, it is currently loose, had productive yellow sputum and they have a sample for testing per Pulmonology to be tested of which she is bringing to lab as requested, also she will be getting an abx that was prescribed on way home from lab drop off. Her question is does Dr Jose R Saul want Meena Gross to have labs-Hgb done at via Home Health(she had tested positive for Covid so regular Home health will not come for draw, but Meena Gross was Negative)  Please call spouse with instructions.

## 2022-08-09 ENCOUNTER — LAB REQUISITION (OUTPATIENT)
Dept: LAB | Facility: HOSPITAL | Age: 87
End: 2022-08-09
Payer: MEDICARE

## 2022-08-09 DIAGNOSIS — D63.0 ANEMIA IN NEOPLASTIC DISEASE: ICD-10-CM

## 2022-08-09 LAB
BASOPHILS # BLD AUTO: 0.04 X10(3) UL (ref 0–0.2)
BASOPHILS NFR BLD AUTO: 0.3 %
EOSINOPHIL # BLD AUTO: 0.16 X10(3) UL (ref 0–0.7)
EOSINOPHIL NFR BLD AUTO: 1.1 %
ERYTHROCYTE [DISTWIDTH] IN BLOOD BY AUTOMATED COUNT: 21.1 %
HCT VFR BLD AUTO: 28.2 %
HGB BLD-MCNC: 8.5 G/DL
IMM GRANULOCYTES # BLD AUTO: 0.08 X10(3) UL (ref 0–1)
IMM GRANULOCYTES NFR BLD: 0.6 %
LYMPHOCYTES # BLD AUTO: 0.94 X10(3) UL (ref 1–4)
LYMPHOCYTES NFR BLD AUTO: 6.7 %
MCH RBC QN AUTO: 28.6 PG (ref 26–34)
MCHC RBC AUTO-ENTMCNC: 30.1 G/DL (ref 31–37)
MCV RBC AUTO: 94.9 FL
MONOCYTES # BLD AUTO: 0.99 X10(3) UL (ref 0.1–1)
MONOCYTES NFR BLD AUTO: 7 %
NEUTROPHILS # BLD AUTO: 11.88 X10 (3) UL (ref 1.5–7.7)
NEUTROPHILS # BLD AUTO: 11.88 X10(3) UL (ref 1.5–7.7)
NEUTROPHILS NFR BLD AUTO: 84.3 %
PLATELET # BLD AUTO: 374 10(3)UL (ref 150–450)
RBC # BLD AUTO: 2.97 X10(6)UL
WBC # BLD AUTO: 14.1 X10(3) UL (ref 4–11)

## 2022-08-09 PROCEDURE — 85025 COMPLETE CBC W/AUTO DIFF WBC: CPT | Performed by: INTERNAL MEDICINE

## 2022-08-12 ENCOUNTER — TELEPHONE (OUTPATIENT)
Dept: HEMATOLOGY/ONCOLOGY | Facility: HOSPITAL | Age: 87
End: 2022-08-12

## 2022-08-12 NOTE — TELEPHONE ENCOUNTER
Treva Crawley called and reported that Balbir Butler was able to supply a sputum specimen on 8/9/2022 as ordered by the pulmonologist. He has been on an antibiotic for the last 4 days. Treva Crawley is going to call the pulmonologist's office today to see if they have the results of the culture. Treva Crawley said they have a lot of family coming in town and wanted to know if Balbir Butler could visit with them? I asked her to please check with his pulmonologist. I did remind her that [address] is still in the high level for Covid 19 cases.  Treva Crawley agreed to check with his pulmonologist.

## 2022-08-15 ENCOUNTER — LAB REQUISITION (OUTPATIENT)
Dept: LAB | Facility: HOSPITAL | Age: 87
End: 2022-08-15
Payer: MEDICARE

## 2022-08-15 DIAGNOSIS — C34.11 MALIGNANT NEOPLASM OF UPPER LOBE, RIGHT BRONCHUS OR LUNG (HCC): ICD-10-CM

## 2022-08-15 DIAGNOSIS — C90.00 MULTIPLE MYELOMA NOT HAVING ACHIEVED REMISSION (HCC): ICD-10-CM

## 2022-08-15 LAB
BASOPHILS # BLD AUTO: 0.05 X10(3) UL (ref 0–0.2)
BASOPHILS NFR BLD AUTO: 0.4 %
EOSINOPHIL # BLD AUTO: 0.14 X10(3) UL (ref 0–0.7)
EOSINOPHIL NFR BLD AUTO: 1 %
ERYTHROCYTE [DISTWIDTH] IN BLOOD BY AUTOMATED COUNT: 21.3 %
HCT VFR BLD AUTO: 28.7 %
HGB BLD-MCNC: 8.8 G/DL
IMM GRANULOCYTES # BLD AUTO: 0.07 X10(3) UL (ref 0–1)
IMM GRANULOCYTES NFR BLD: 0.5 %
LYMPHOCYTES # BLD AUTO: 0.92 X10(3) UL (ref 1–4)
LYMPHOCYTES NFR BLD AUTO: 6.9 %
MCH RBC QN AUTO: 28.7 PG (ref 26–34)
MCHC RBC AUTO-ENTMCNC: 30.7 G/DL (ref 31–37)
MCV RBC AUTO: 93.5 FL
MONOCYTES # BLD AUTO: 0.94 X10(3) UL (ref 0.1–1)
MONOCYTES NFR BLD AUTO: 7 %
NEUTROPHILS # BLD AUTO: 11.23 X10 (3) UL (ref 1.5–7.7)
NEUTROPHILS # BLD AUTO: 11.23 X10(3) UL (ref 1.5–7.7)
NEUTROPHILS NFR BLD AUTO: 84.2 %
PLATELET # BLD AUTO: 393 10(3)UL (ref 150–450)
RBC # BLD AUTO: 3.07 X10(6)UL
WBC # BLD AUTO: 13.4 X10(3) UL (ref 4–11)

## 2022-08-15 PROCEDURE — 85025 COMPLETE CBC W/AUTO DIFF WBC: CPT | Performed by: INTERNAL MEDICINE

## 2022-08-22 ENCOUNTER — LAB REQUISITION (OUTPATIENT)
Dept: LAB | Facility: HOSPITAL | Age: 87
End: 2022-08-22
Payer: MEDICARE

## 2022-08-22 DIAGNOSIS — C34.11 MALIGNANT NEOPLASM OF UPPER LOBE, RIGHT BRONCHUS OR LUNG (HCC): ICD-10-CM

## 2022-08-22 LAB
BASOPHILS # BLD AUTO: 0.06 X10(3) UL (ref 0–0.2)
BASOPHILS NFR BLD AUTO: 0.5 %
EOSINOPHIL # BLD AUTO: 0.23 X10(3) UL (ref 0–0.7)
EOSINOPHIL NFR BLD AUTO: 1.7 %
ERYTHROCYTE [DISTWIDTH] IN BLOOD BY AUTOMATED COUNT: 22.1 %
HCT VFR BLD AUTO: 30.3 %
HGB BLD-MCNC: 9 G/DL
IMM GRANULOCYTES # BLD AUTO: 0.07 X10(3) UL (ref 0–1)
IMM GRANULOCYTES NFR BLD: 0.5 %
LYMPHOCYTES # BLD AUTO: 1.18 X10(3) UL (ref 1–4)
LYMPHOCYTES NFR BLD AUTO: 8.9 %
MCH RBC QN AUTO: 28.8 PG (ref 26–34)
MCHC RBC AUTO-ENTMCNC: 29.7 G/DL (ref 31–37)
MCV RBC AUTO: 96.8 FL
MONOCYTES # BLD AUTO: 1.04 X10(3) UL (ref 0.1–1)
MONOCYTES NFR BLD AUTO: 7.9 %
NEUTROPHILS # BLD AUTO: 10.61 X10 (3) UL (ref 1.5–7.7)
NEUTROPHILS # BLD AUTO: 10.61 X10(3) UL (ref 1.5–7.7)
NEUTROPHILS NFR BLD AUTO: 80.5 %
PLATELET # BLD AUTO: 348 10(3)UL (ref 150–450)
PLATELET MORPHOLOGY: NORMAL
RBC # BLD AUTO: 3.13 X10(6)UL
WBC # BLD AUTO: 13.2 X10(3) UL (ref 4–11)

## 2022-08-22 PROCEDURE — 85025 COMPLETE CBC W/AUTO DIFF WBC: CPT | Performed by: INTERNAL MEDICINE

## 2022-08-29 ENCOUNTER — LAB REQUISITION (OUTPATIENT)
Dept: LAB | Age: 87
End: 2022-08-29
Payer: MEDICARE

## 2022-08-29 ENCOUNTER — TELEPHONE (OUTPATIENT)
Dept: HEMATOLOGY/ONCOLOGY | Facility: HOSPITAL | Age: 87
End: 2022-08-29

## 2022-08-29 DIAGNOSIS — C34.11 MALIGNANT NEOPLASM OF UPPER LOBE, RIGHT BRONCHUS OR LUNG (HCC): ICD-10-CM

## 2022-08-29 LAB
BASOPHILS # BLD AUTO: 0.06 X10(3) UL (ref 0–0.2)
BASOPHILS NFR BLD AUTO: 0.5 %
EOSINOPHIL # BLD AUTO: 0.17 X10(3) UL (ref 0–0.7)
EOSINOPHIL NFR BLD AUTO: 1.5 %
ERYTHROCYTE [DISTWIDTH] IN BLOOD BY AUTOMATED COUNT: 22.2 %
HCT VFR BLD AUTO: 33.4 %
HGB BLD-MCNC: 9.8 G/DL
IMM GRANULOCYTES # BLD AUTO: 0.06 X10(3) UL (ref 0–1)
IMM GRANULOCYTES NFR BLD: 0.5 %
LYMPHOCYTES # BLD AUTO: 0.84 X10(3) UL (ref 1–4)
LYMPHOCYTES NFR BLD AUTO: 7.2 %
MCH RBC QN AUTO: 28.5 PG (ref 26–34)
MCHC RBC AUTO-ENTMCNC: 29.3 G/DL (ref 31–37)
MCV RBC AUTO: 97.1 FL
MONOCYTES # BLD AUTO: 0.74 X10(3) UL (ref 0.1–1)
MONOCYTES NFR BLD AUTO: 6.3 %
NEUTROPHILS # BLD AUTO: 9.85 X10 (3) UL (ref 1.5–7.7)
NEUTROPHILS # BLD AUTO: 9.85 X10(3) UL (ref 1.5–7.7)
NEUTROPHILS NFR BLD AUTO: 84 %
PLATELET # BLD AUTO: 354 10(3)UL (ref 150–450)
PLATELET MORPHOLOGY: NORMAL
RBC # BLD AUTO: 3.44 X10(6)UL
WBC # BLD AUTO: 11.7 X10(3) UL (ref 4–11)

## 2022-08-29 PROCEDURE — 85025 COMPLETE CBC W/AUTO DIFF WBC: CPT | Performed by: INTERNAL MEDICINE

## 2022-08-29 NOTE — TELEPHONE ENCOUNTER
[place] [address] [facility]   [facility].        Requesting a verbal order for Physical Health    Please call [phone]
Verbal order given to extend physical therapy.
General

## 2022-09-06 ENCOUNTER — LAB REQUISITION (OUTPATIENT)
Dept: LAB | Facility: HOSPITAL | Age: 87
End: 2022-09-06
Payer: MEDICARE

## 2022-09-06 DIAGNOSIS — C34.11 MALIGNANT NEOPLASM OF UPPER LOBE, RIGHT BRONCHUS OR LUNG (HCC): ICD-10-CM

## 2022-09-06 LAB
BASOPHILS # BLD AUTO: 0.04 X10(3) UL (ref 0–0.2)
BASOPHILS NFR BLD AUTO: 0.4 %
EOSINOPHIL # BLD AUTO: 0.19 X10(3) UL (ref 0–0.7)
EOSINOPHIL NFR BLD AUTO: 1.7 %
ERYTHROCYTE [DISTWIDTH] IN BLOOD BY AUTOMATED COUNT: 21.8 %
HCT VFR BLD AUTO: 30.6 %
HGB BLD-MCNC: 9.4 G/DL
IMM GRANULOCYTES # BLD AUTO: 0.06 X10(3) UL (ref 0–1)
IMM GRANULOCYTES NFR BLD: 0.5 %
LYMPHOCYTES # BLD AUTO: 0.94 X10(3) UL (ref 1–4)
LYMPHOCYTES NFR BLD AUTO: 8.5 %
MCH RBC QN AUTO: 28.8 PG (ref 26–34)
MCHC RBC AUTO-ENTMCNC: 30.7 G/DL (ref 31–37)
MCV RBC AUTO: 93.9 FL
MONOCYTES # BLD AUTO: 0.94 X10(3) UL (ref 0.1–1)
MONOCYTES NFR BLD AUTO: 8.5 %
NEUTROPHILS # BLD AUTO: 8.93 X10 (3) UL (ref 1.5–7.7)
NEUTROPHILS # BLD AUTO: 8.93 X10(3) UL (ref 1.5–7.7)
NEUTROPHILS NFR BLD AUTO: 80.4 %
PLATELET # BLD AUTO: 277 10(3)UL (ref 150–450)
RBC # BLD AUTO: 3.26 X10(6)UL
WBC # BLD AUTO: 11.1 X10(3) UL (ref 4–11)

## 2022-09-06 PROCEDURE — 85025 COMPLETE CBC W/AUTO DIFF WBC: CPT | Performed by: INTERNAL MEDICINE

## 2022-09-12 NOTE — TELEPHONE ENCOUNTER
Dr Dorlene Lundborg patient - lung cancer not receiving active treatment    Bruise By Right Wrist/Forearm: Che Urban reports Anastasia Quinones has a bruise by his right wrist & forearm. No bruises anywhere else. No active bleeding. Patient does not remember bumping himself. CBC on 89/6/2022 the platelet count = 598V.)  Cough: (Patient's wife reports that he has had a cough for the last 3-5 days. No chills, shakes, fever, runny nose, post nasal drip, headache, sore throat, head congestion, chest congestion, body aches. No known sick contacts or Covid 19+ contacts. Wife reports he is coughing  5-7 times throughout the day. Sometimes he will cough up clear phlegm. Anastasia Quinones denies dyspnea at rest or when up walking today. No chest pain, chest pressure. No sharp stabbing pain when he takes deep breaths.)    Antwon Patterson reports that his pulmonologist prescribed an expectorant and inhaler he could use if needed. He has never used either medication. I told Antwon Patterson I will update Dr Dorlene Lundborg and her nurse. I also asked Antwon Patterson to call his pulmonologist and update them. Antwon Patterson said Anastasia Quinones is requesting to make a follow up appointment in the office since he has not seen her is a long time. I transferred Antwon Patterson to [facility] to book the appointment.

## 2022-09-13 ENCOUNTER — LAB REQUISITION (OUTPATIENT)
Dept: LAB | Facility: HOSPITAL | Age: 87
End: 2022-09-13
Payer: MEDICARE

## 2022-09-13 DIAGNOSIS — C90.00 MULTIPLE MYELOMA NOT HAVING ACHIEVED REMISSION (HCC): ICD-10-CM

## 2022-09-13 LAB
BASOPHILS # BLD AUTO: 0.04 X10(3) UL (ref 0–0.2)
BASOPHILS NFR BLD AUTO: 0.4 %
EOSINOPHIL # BLD AUTO: 0.2 X10(3) UL (ref 0–0.7)
EOSINOPHIL NFR BLD AUTO: 1.8 %
ERYTHROCYTE [DISTWIDTH] IN BLOOD BY AUTOMATED COUNT: 21.1 %
HCT VFR BLD AUTO: 34.5 %
HGB BLD-MCNC: 10.3 G/DL
IMM GRANULOCYTES # BLD AUTO: 0.04 X10(3) UL (ref 0–1)
IMM GRANULOCYTES NFR BLD: 0.4 %
LYMPHOCYTES # BLD AUTO: 0.92 X10(3) UL (ref 1–4)
LYMPHOCYTES NFR BLD AUTO: 8.5 %
MCH RBC QN AUTO: 28.8 PG (ref 26–34)
MCHC RBC AUTO-ENTMCNC: 29.9 G/DL (ref 31–37)
MCV RBC AUTO: 96.4 FL
MONOCYTES # BLD AUTO: 0.73 X10(3) UL (ref 0.1–1)
MONOCYTES NFR BLD AUTO: 6.7 %
NEUTROPHILS # BLD AUTO: 8.9 X10 (3) UL (ref 1.5–7.7)
NEUTROPHILS # BLD AUTO: 8.9 X10(3) UL (ref 1.5–7.7)
NEUTROPHILS NFR BLD AUTO: 82.2 %
PLATELET # BLD AUTO: 307 10(3)UL (ref 150–450)
RBC # BLD AUTO: 3.58 X10(6)UL
WBC # BLD AUTO: 10.8 X10(3) UL (ref 4–11)

## 2022-09-13 PROCEDURE — 85025 COMPLETE CBC W/AUTO DIFF WBC: CPT | Performed by: INTERNAL MEDICINE

## 2022-09-15 ENCOUNTER — OFFICE VISIT (OUTPATIENT)
Dept: HEMATOLOGY/ONCOLOGY | Facility: HOSPITAL | Age: 87
End: 2022-09-15
Attending: INTERNAL MEDICINE

## 2022-09-15 VITALS
HEIGHT: 72.01 IN | SYSTOLIC BLOOD PRESSURE: 110 MMHG | TEMPERATURE: 97 F | RESPIRATION RATE: 16 BRPM | HEART RATE: 97 BPM | OXYGEN SATURATION: 95 % | BODY MASS INDEX: 16.25 KG/M2 | DIASTOLIC BLOOD PRESSURE: 73 MMHG | WEIGHT: 120 LBS

## 2022-09-15 DIAGNOSIS — D64.9 TRANSFUSION-DEPENDENT ANEMIA: ICD-10-CM

## 2022-09-15 DIAGNOSIS — C78.2 PLEURAL METASTASIS (HCC): ICD-10-CM

## 2022-09-15 DIAGNOSIS — C34.31 MALIGNANT NEOPLASM OF LOWER LOBE OF RIGHT LUNG (HCC): ICD-10-CM

## 2022-09-15 DIAGNOSIS — T80.89XA TRANSFUSION HEMOSIDEROSIS: ICD-10-CM

## 2022-09-15 DIAGNOSIS — D61.9 ANEMIA DUE TO BONE MARROW FAILURE, UNSPECIFIED BONE MARROW FAILURE TYPE (HCC): Primary | ICD-10-CM

## 2022-09-15 DIAGNOSIS — C90.00 MULTIPLE MYELOMA NOT HAVING ACHIEVED REMISSION (HCC): ICD-10-CM

## 2022-09-15 DIAGNOSIS — R59.9 ADENOPATHY: ICD-10-CM

## 2022-09-15 LAB
ALBUMIN SERPL-MCNC: 2.2 G/DL (ref 3.4–5)
ALBUMIN/GLOB SERPL: 0.4 {RATIO} (ref 1–2)
ALP LIVER SERPL-CCNC: 100 U/L
ALT SERPL-CCNC: 13 U/L
ANION GAP SERPL CALC-SCNC: 2 MMOL/L (ref 0–18)
AST SERPL-CCNC: 10 U/L (ref 15–37)
BASOPHILS # BLD AUTO: 0.05 X10(3) UL (ref 0–0.2)
BASOPHILS NFR BLD AUTO: 0.4 %
BILIRUB SERPL-MCNC: 0.6 MG/DL (ref 0.1–2)
BUN BLD-MCNC: 24 MG/DL (ref 7–18)
CALCIUM BLD-MCNC: 9 MG/DL (ref 8.5–10.1)
CEA SERPL-MCNC: 5.3 NG/ML (ref ?–5)
CHLORIDE SERPL-SCNC: 104 MMOL/L (ref 98–112)
CO2 SERPL-SCNC: 27 MMOL/L (ref 21–32)
CREAT BLD-MCNC: 0.89 MG/DL
EOSINOPHIL # BLD AUTO: 0.19 X10(3) UL (ref 0–0.7)
EOSINOPHIL NFR BLD AUTO: 1.7 %
ERYTHROCYTE [DISTWIDTH] IN BLOOD BY AUTOMATED COUNT: 21 %
GFR SERPLBLD BASED ON 1.73 SQ M-ARVRAT: 83 ML/MIN/1.73M2 (ref 60–?)
GLOBULIN PLAS-MCNC: 6.1 G/DL (ref 2.8–4.4)
GLUCOSE BLD-MCNC: 112 MG/DL (ref 70–99)
HCT VFR BLD AUTO: 33.6 %
HGB BLD-MCNC: 10.2 G/DL
IGA SERPL-MCNC: 89.1 MG/DL (ref 70–312)
IGM SERPL-MCNC: 45.6 MG/DL (ref 43–279)
IMM GRANULOCYTES # BLD AUTO: 0.06 X10(3) UL (ref 0–1)
IMM GRANULOCYTES NFR BLD: 0.5 %
IMMUNOGLOBULIN PNL SER-MCNC: 3220 MG/DL (ref 791–1643)
LDH SERPL L TO P-CCNC: 110 U/L
LYMPHOCYTES # BLD AUTO: 0.85 X10(3) UL (ref 1–4)
LYMPHOCYTES NFR BLD AUTO: 7.4 %
MCH RBC QN AUTO: 28.7 PG (ref 26–34)
MCHC RBC AUTO-ENTMCNC: 30.4 G/DL (ref 31–37)
MCV RBC AUTO: 94.6 FL
MONOCYTES # BLD AUTO: 0.85 X10(3) UL (ref 0.1–1)
MONOCYTES NFR BLD AUTO: 7.4 %
NEUTROPHILS # BLD AUTO: 9.44 X10 (3) UL (ref 1.5–7.7)
NEUTROPHILS # BLD AUTO: 9.44 X10(3) UL (ref 1.5–7.7)
NEUTROPHILS NFR BLD AUTO: 82.6 %
OSMOLALITY SERPL CALC.SUM OF ELEC: 281 MOSM/KG (ref 275–295)
PLATELET # BLD AUTO: 321 10(3)UL (ref 150–450)
POTASSIUM SERPL-SCNC: 4.4 MMOL/L (ref 3.5–5.1)
PROT SERPL-MCNC: 8.3 G/DL (ref 6.4–8.2)
RBC # BLD AUTO: 3.55 X10(6)UL
SODIUM SERPL-SCNC: 133 MMOL/L (ref 136–145)
WBC # BLD AUTO: 11.4 X10(3) UL (ref 4–11)

## 2022-09-15 PROCEDURE — 99215 OFFICE O/P EST HI 40 MIN: CPT | Performed by: INTERNAL MEDICINE

## 2022-09-19 ENCOUNTER — LAB REQUISITION (OUTPATIENT)
Dept: LAB | Facility: HOSPITAL | Age: 87
End: 2022-09-19

## 2022-09-19 DIAGNOSIS — C90.00 MULTIPLE MYELOMA NOT HAVING ACHIEVED REMISSION (HCC): ICD-10-CM

## 2022-09-19 DIAGNOSIS — C34.11 MALIGNANT NEOPLASM OF UPPER LOBE, RIGHT BRONCHUS OR LUNG (HCC): ICD-10-CM

## 2022-09-19 LAB
BASOPHILS # BLD AUTO: 0.04 X10(3) UL (ref 0–0.2)
BASOPHILS NFR BLD AUTO: 0.3 %
DEPRECATED RDW RBC AUTO: 73.7 FL (ref 35.1–46.3)
EOSINOPHIL # BLD AUTO: 0.11 X10(3) UL (ref 0–0.7)
EOSINOPHIL NFR BLD AUTO: 0.9 %
ERYTHROCYTE [DISTWIDTH] IN BLOOD BY AUTOMATED COUNT: 20.8 % (ref 11–15)
HCT VFR BLD AUTO: 34.8 %
HGB BLD-MCNC: 10.4 G/DL
IMM GRANULOCYTES # BLD AUTO: 0.05 X10(3) UL (ref 0–1)
IMM GRANULOCYTES NFR BLD: 0.4 %
LYMPHOCYTES # BLD AUTO: 0.79 X10(3) UL (ref 1–4)
LYMPHOCYTES NFR BLD AUTO: 6.7 %
MCH RBC QN AUTO: 28.7 PG (ref 26–34)
MCHC RBC AUTO-ENTMCNC: 29.9 G/DL (ref 31–37)
MCV RBC AUTO: 96.1 FL
MONOCYTES # BLD AUTO: 0.89 X10(3) UL (ref 0.1–1)
MONOCYTES NFR BLD AUTO: 7.5 %
NEUTROPHILS # BLD AUTO: 9.98 X10 (3) UL (ref 1.5–7.7)
NEUTROPHILS # BLD AUTO: 9.98 X10(3) UL (ref 1.5–7.7)
NEUTROPHILS NFR BLD AUTO: 84.2 %
PLATELET # BLD AUTO: 332 10(3)UL (ref 150–450)
PLATELET MORPHOLOGY: NORMAL
RBC # BLD AUTO: 3.62 X10(6)UL
WBC # BLD AUTO: 11.9 X10(3) UL (ref 4–11)

## 2022-09-19 PROCEDURE — 85025 COMPLETE CBC W/AUTO DIFF WBC: CPT | Performed by: INTERNAL MEDICINE

## 2022-09-20 ENCOUNTER — TELEPHONE (OUTPATIENT)
Dept: HEMATOLOGY/ONCOLOGY | Facility: HOSPITAL | Age: 87
End: 2022-09-20

## 2022-09-20 LAB
ALBUMIN SERPL ELPH-MCNC: 2.94 G/DL (ref 3.75–5.21)
ALBUMIN/GLOB SERPL: 0.58 {RATIO} (ref 1–2)
ALPHA1 GLOB SERPL ELPH-MCNC: 0.52 G/DL (ref 0.19–0.46)
ALPHA2 GLOB SERPL ELPH-MCNC: 0.8 G/DL (ref 0.48–1.05)
B-GLOBULIN SERPL ELPH-MCNC: 0.54 G/DL (ref 0.68–1.23)
GAMMA GLOB SERPL ELPH-MCNC: 3.2 G/DL (ref 0.62–1.7)
KAPPA LC FREE SER-MCNC: 7.04 MG/DL (ref 0.33–1.94)
KAPPA LC FREE/LAMBDA FREE SER NEPH: 1.12 {RATIO} (ref 0.26–1.65)
LAMBDA LC FREE SERPL-MCNC: 6.31 MG/DL (ref 0.57–2.63)
M PROTEIN 1 SERPL ELPH-MCNC: 2.27 G/DL (ref ?–0)
PROT SERPL-MCNC: 8 G/DL (ref 6.4–8.2)

## 2022-09-20 NOTE — TELEPHONE ENCOUNTER
Miguel Charon called from [facility] health. She said she is going to see the patient every week. The patient had an episode last night and they would like him to be seen weekly. Please call Miguel Michaud when able. Thank you.

## 2022-09-26 ENCOUNTER — LAB REQUISITION (OUTPATIENT)
Dept: LAB | Facility: HOSPITAL | Age: 87
End: 2022-09-26

## 2022-09-26 DIAGNOSIS — C90.00 MULTIPLE MYELOMA NOT HAVING ACHIEVED REMISSION (HCC): ICD-10-CM

## 2022-09-26 LAB
BASOPHILS # BLD AUTO: 0.03 X10(3) UL (ref 0–0.2)
BASOPHILS NFR BLD AUTO: 0.3 %
EOSINOPHIL # BLD AUTO: 0.13 X10(3) UL (ref 0–0.7)
EOSINOPHIL NFR BLD AUTO: 1.1 %
ERYTHROCYTE [DISTWIDTH] IN BLOOD BY AUTOMATED COUNT: 20.6 %
HCT VFR BLD AUTO: 34.8 %
HGB BLD-MCNC: 10.6 G/DL
IMM GRANULOCYTES # BLD AUTO: 0.05 X10(3) UL (ref 0–1)
IMM GRANULOCYTES NFR BLD: 0.4 %
LYMPHOCYTES # BLD AUTO: 0.95 X10(3) UL (ref 1–4)
LYMPHOCYTES NFR BLD AUTO: 8.1 %
MCH RBC QN AUTO: 28.8 PG (ref 26–34)
MCHC RBC AUTO-ENTMCNC: 30.5 G/DL (ref 31–37)
MCV RBC AUTO: 94.6 FL
MONOCYTES # BLD AUTO: 0.95 X10(3) UL (ref 0.1–1)
MONOCYTES NFR BLD AUTO: 8.1 %
NEUTROPHILS # BLD AUTO: 9.65 X10 (3) UL (ref 1.5–7.7)
NEUTROPHILS # BLD AUTO: 9.65 X10(3) UL (ref 1.5–7.7)
NEUTROPHILS NFR BLD AUTO: 82 %
PLATELET # BLD AUTO: 352 10(3)UL (ref 150–450)
RBC # BLD AUTO: 3.68 X10(6)UL
WBC # BLD AUTO: 11.8 X10(3) UL (ref 4–11)

## 2022-09-26 PROCEDURE — 85025 COMPLETE CBC W/AUTO DIFF WBC: CPT | Performed by: INTERNAL MEDICINE

## 2022-10-04 ENCOUNTER — LAB REQUISITION (OUTPATIENT)
Dept: LAB | Facility: HOSPITAL | Age: 87
End: 2022-10-04
Payer: MEDICARE

## 2022-10-04 DIAGNOSIS — C34.11 MALIGNANT NEOPLASM OF UPPER LOBE, RIGHT BRONCHUS OR LUNG (HCC): ICD-10-CM

## 2022-10-04 DIAGNOSIS — C90.00 MULTIPLE MYELOMA NOT HAVING ACHIEVED REMISSION (HCC): ICD-10-CM

## 2022-10-04 LAB
BASOPHILS # BLD AUTO: 0.04 X10(3) UL (ref 0–0.2)
BASOPHILS NFR BLD AUTO: 0.3 %
EOSINOPHIL # BLD AUTO: 0.08 X10(3) UL (ref 0–0.7)
EOSINOPHIL NFR BLD AUTO: 0.7 %
ERYTHROCYTE [DISTWIDTH] IN BLOOD BY AUTOMATED COUNT: 20.7 %
HCT VFR BLD AUTO: 34.8 %
HGB BLD-MCNC: 10.7 G/DL
IMM GRANULOCYTES # BLD AUTO: 0.05 X10(3) UL (ref 0–1)
IMM GRANULOCYTES NFR BLD: 0.4 %
LYMPHOCYTES # BLD AUTO: 1.1 X10(3) UL (ref 1–4)
LYMPHOCYTES NFR BLD AUTO: 9.5 %
MCH RBC QN AUTO: 29.1 PG (ref 26–34)
MCHC RBC AUTO-ENTMCNC: 30.7 G/DL (ref 31–37)
MCV RBC AUTO: 94.6 FL
MONOCYTES # BLD AUTO: 0.83 X10(3) UL (ref 0.1–1)
MONOCYTES NFR BLD AUTO: 7.2 %
NEUTROPHILS # BLD AUTO: 9.44 X10 (3) UL (ref 1.5–7.7)
NEUTROPHILS # BLD AUTO: 9.44 X10(3) UL (ref 1.5–7.7)
NEUTROPHILS NFR BLD AUTO: 81.9 %
PLATELET # BLD AUTO: 327 10(3)UL (ref 150–450)
RBC # BLD AUTO: 3.68 X10(6)UL
WBC # BLD AUTO: 11.5 X10(3) UL (ref 4–11)

## 2022-10-04 PROCEDURE — 85025 COMPLETE CBC W/AUTO DIFF WBC: CPT | Performed by: INTERNAL MEDICINE

## 2022-10-07 ENCOUNTER — TELEPHONE (OUTPATIENT)
Dept: HEMATOLOGY/ONCOLOGY | Facility: HOSPITAL | Age: 87
End: 2022-10-07

## 2022-10-10 ENCOUNTER — TELEPHONE (OUTPATIENT)
Dept: HEMATOLOGY/ONCOLOGY | Facility: HOSPITAL | Age: 87
End: 2022-10-10

## 2022-10-10 ENCOUNTER — LAB REQUISITION (OUTPATIENT)
Dept: LAB | Facility: HOSPITAL | Age: 87
End: 2022-10-10
Payer: MEDICARE

## 2022-10-10 NOTE — TELEPHONE ENCOUNTER
I called Treva Crawley and updated her that Dr Xiang Cam is not in the office today. Arpita Ferrari did review West's cbc. She can't explain why his Hgb is up. She feels it is a good thing. Treva Crawley thanked me for the call back. No other questions or concerns at this time.

## 2022-10-10 NOTE — TELEPHONE ENCOUNTER
Dr Prabhakar Gonzales patient - metastatic NSCLC, Multiple Myeloma, anemia not receiving active treatment. Linsey Combs and Curlee Colon called to let Dr Prabhakar Gonzales know that Portia Walters had a cbc drawn today. Linsey Combs reviewed the results and saw his Hgb=12.4. Both Linsey Combs and Mookduke Walters would like Dr Blanca Pemberton thoughts on why his Hgb jumped? Linsey Combs also said that Portia Walters continues to complain of feeling fatigued. He can walk about 15 feet and then he is short of breath. No chest pain, chest pressure, chest tightness. He denies chills, shakes, fever, cold/flu symptoms or Covid 19 symptoms. Portia Walters said he is eating well. He has been drinking 40oz of the recommended 64-80oz of caffeine free fluids daily. Portia Walters denies having any pain any where. He does not feel light headed or dizzy when he goes from sitting to standing. \"Other than feeling tired, I feel good. \" I encouraged him to try to increase his fluid intake. Linsey Combs said she will call and get West's CT scan scheduled. She will then call and book a follow up appointment. Linsey Combs is eager to hear from Dr Prabhakar Gonzales about her thoughts on West's Hgb level.

## 2022-10-12 ENCOUNTER — APPOINTMENT (OUTPATIENT)
Dept: GENERAL RADIOLOGY | Facility: HOSPITAL | Age: 87
End: 2022-10-12
Attending: EMERGENCY MEDICINE
Payer: MEDICARE

## 2022-10-12 ENCOUNTER — APPOINTMENT (OUTPATIENT)
Dept: CT IMAGING | Facility: HOSPITAL | Age: 87
End: 2022-10-12
Attending: EMERGENCY MEDICINE
Payer: MEDICARE

## 2022-10-12 ENCOUNTER — HOSPITAL ENCOUNTER (INPATIENT)
Facility: HOSPITAL | Age: 87
LOS: 2 days | Discharge: HOME | End: 2022-10-14
Attending: EMERGENCY MEDICINE | Admitting: INTERNAL MEDICINE
Payer: MEDICARE

## 2022-10-12 PROBLEM — R73.9 HYPERGLYCEMIA: Status: ACTIVE | Noted: 2022-10-12

## 2022-10-12 PROBLEM — C34.11 MALIGNANT NEOPLASM OF UPPER LOBE OF RIGHT LUNG (HCC): Status: ACTIVE | Noted: 2022-01-01

## 2022-10-12 PROBLEM — J18.9 PNEUMONIA OF RIGHT UPPER LOBE DUE TO INFECTIOUS ORGANISM: Status: ACTIVE | Noted: 2022-10-12

## 2022-10-12 PROBLEM — J18.9 PNEUMONIA OF RIGHT UPPER LOBE DUE TO INFECTIOUS ORGANISM: Status: ACTIVE | Noted: 2022-01-01

## 2022-10-12 PROBLEM — D72.829 LEUKOCYTOSIS: Status: ACTIVE | Noted: 2022-10-12

## 2022-10-12 PROBLEM — R73.9 HYPERGLYCEMIA: Status: ACTIVE | Noted: 2022-01-01

## 2022-10-12 PROBLEM — C34.11 MALIGNANT NEOPLASM OF UPPER LOBE OF RIGHT LUNG (HCC): Status: ACTIVE | Noted: 2022-10-12

## 2022-10-12 PROBLEM — D72.829 LEUKOCYTOSIS: Status: ACTIVE | Noted: 2022-01-01

## 2022-10-12 PROBLEM — E87.1 HYPONATREMIA: Status: ACTIVE | Noted: 2022-10-12

## 2022-10-12 PROBLEM — R79.89 AZOTEMIA: Status: ACTIVE | Noted: 2022-10-12

## 2022-10-12 PROBLEM — R79.89 AZOTEMIA: Status: ACTIVE | Noted: 2022-01-01

## 2022-10-12 PROBLEM — E87.1 HYPONATREMIA: Status: ACTIVE | Noted: 2022-01-01

## 2022-10-13 NOTE — PLAN OF CARE
Patient received via stretcher, admit from ER, alert and oriented x 2, accompanied by wife and daughter, lungs decreased on right and diminished on left, currently on room air, sats 92-93%, abdomen soft, bowel sounds present, voids, asymptomatic, some incontinence, denies pain, per family request to access right PAC, and discontinued RAC, repeat lactic drawn from [facility], IVF TKVO, regular diet ordered and patient had sandwich, fall and aspiration precautions in place, medications per MAR, VSS, afebrile.

## 2022-10-13 NOTE — ED QUICK NOTES
Blood cultures and urine cultures drawn prior to abx start time. Duplicate blood culture order that I am unable to cancel.

## 2022-10-13 NOTE — CM/SW NOTE
SW noted consult for home health services for patient. SW completed chart review and patient has worked with [facility] in the past. SW sent referral for [address] [place] [address] [facility] and will meet with patient to discuss [facility] services once list of accepting agencies is received. SW will continue to follow for plan of care changes and remain available for any additional DC needs or concerns.      Adrian Carvalho MSW, LSW  Discharge Planner   [phone]

## 2022-10-13 NOTE — CM/SW NOTE
SW met with patient to notify him that [facility] has reported that they will be able to continue services with him after DC. AVS updated. SW will continue to follow for plan of care changes and remain available for any additional DC needs or concerns.      Laura Kennedy MSW, LSW  Discharge Planner   [phone]

## 2022-10-14 NOTE — PROGRESS NOTES
Patient is alert and oriented x2. No complaints of pain. Medications given per MAR. Alteplase given for port. Safety precautions in place. Call light within reach.

## 2022-10-14 NOTE — PLAN OF CARE
Patient A&O x2. VSS. Afebrile. Patient does not complain of pain. Medications administered per orders. IVF infusing. Safety precautions in place, will continue to monitor.      Problem: HEMATOLOGIC - ADULT  Goal: Free from bleeding injury  Description: (Example usage: patient with low platelets)  INTERVENTIONS:  - Avoid intramuscular injections, enemas and rectal medication administration  - Ensure safe mobilization of patient  - Hold pressure on venipuncture sites to achieve adequate hemostasis  - Assess for signs and symptoms of internal bleeding  - Monitor lab trends  - Patient is to report abnormal signs of bleeding to staff  - Avoid use of toothpicks and dental floss  - Use electric shaver for shaving  - Use soft bristle tooth brush  - Limit straining and forceful nose blowing  Outcome: Progressing

## 2022-10-14 NOTE — PROGRESS NOTES
Full Note to Follow    Pt seen and examined.    Stable for dc from IM standpoint    dw pt, wife, and RN

## 2022-10-14 NOTE — PROGRESS NOTES
Patient chart reviewed for discharge: Medication Reconciliation completed, Specialist/PCP follow up listed, and disease specific Instructions/Education included in After Visit Summary. Discharge RN notified patient's RN of AVS completion and verified all consultants have signed off. Patient's RN to notify DC RN if discharge status changes. DC this PM if doing clinically well per Dr. Lang Sands. Pt. Current w/ [facility].

## 2022-10-15 NOTE — PROGRESS NOTES
NURSING DISCHARGE NOTE    Discharged Home via Wheelchair. Accompanied by Spouse  Belongings Taken by patient/family. Patient is alert and oriented x2. No complaints of pain. Medications given per MAR. Plans for discharge today. Went over discharge instructions. No questions at this time. Flushed port with heparin and decannulated. Patient left with all belongings.

## 2022-10-16 PROBLEM — R09.02 HYPOXIA: Status: ACTIVE | Noted: 2022-01-01

## 2022-10-16 PROBLEM — R09.02 HYPOXIA: Status: ACTIVE | Noted: 2022-10-16

## 2022-10-16 PROBLEM — C34.91 NON-SMALL CELL CANCER OF RIGHT LUNG (HCC): Status: ACTIVE | Noted: 2022-01-01

## 2022-10-16 PROBLEM — J90 PLEURAL EFFUSION: Status: ACTIVE | Noted: 2022-10-16

## 2022-10-16 PROBLEM — J90 PLEURAL EFFUSION: Status: ACTIVE | Noted: 2022-01-01

## 2022-10-16 PROBLEM — C34.91 NON-SMALL CELL CANCER OF RIGHT LUNG (HCC): Status: ACTIVE | Noted: 2022-10-16

## 2022-10-16 NOTE — ED INITIAL ASSESSMENT (HPI)
Patient was discharged last Friday from hospital. Today called EMS for KELSEY, 88% on RA. Given neb treatment and now saturating at 96%.  Hx of lung CA

## 2022-10-17 ENCOUNTER — ANESTHESIA EVENT (OUTPATIENT)
Dept: MEDSURG UNIT | Facility: HOSPITAL | Age: 87
End: 2022-10-17
Payer: MEDICARE

## 2022-10-17 ENCOUNTER — ANESTHESIA (OUTPATIENT)
Dept: MEDSURG UNIT | Facility: HOSPITAL | Age: 87
End: 2022-10-17
Payer: MEDICARE

## 2022-10-17 NOTE — CM/SW NOTE
[facility] order noted and called to [facility] # [phone].     Sera Whelan RN, BSN   [phone]

## 2022-10-17 NOTE — PROGRESS NOTES
[facility] liaison received hospice referral, liaison spoke with wife Misael Melchor agreed to meet today at 5p bedside to discuss hospice.

## 2022-10-17 NOTE — PROGRESS NOTES
Pt is alert and oriented and has oxygen  3 liters on nasal cannula. Pt has cardizem drip running at 5 mg an hour. Pt has stable vitals and family at bedside. Pt is wearing a brief. Pt has no complaints of pain.

## 2022-10-17 NOTE — HOSPICE RN NOTE
[facility] consult order received. [facility] will follow up with this patient and family today.      Amy Diggs RN, [facility] [place] Liaison  [phone] after hours

## 2022-10-17 NOTE — HOSPICE RN NOTE
[facility] nursing visit for follow up on hospice consult order. Joint visit with [facility]iative team. Met with spouse and two daughters. Discussed hospice benefit, hospice philosophy, palliative care with questions and concerns addressed. Hospice informational book was provided. Patient appropriate for hospice with dx of NSCLC. He has other multiple comorbid conditions. Most likely home with hospice. Following up with family tomorrow morning at 11 am to discuss final decision and plans moving forward. Please call [facility] with any questions or concerns.     David Quinones RN, [address] [place] [address] Liaison  [phone] after hours

## 2022-10-18 NOTE — PROGRESS NOTES
Family decided to transition to comfort care. See orders. [facility] notified prior to extubation. Patient is not a potential donor. Patient compassionately weaned. Drips turned off. Patient passed at [phone]. Asystole and no respirations noted. Confirmed by second RN, Dominga Burrell notified of patient death. All belongings sent home with family. Post-mortem care completed.

## 2022-10-18 NOTE — PROGRESS NOTES
Responded to the telemetry call about pt being derrek in low 30s. Patient was found unresponsive with HR in low 30s, dropping down. Since the pt's code status was DNAR/full treatment rapid response was called. Family member at the bedside first said he wants the pt to be comfortable but after talking to family on the phone, family decided they wanted everything to be done. Code blue was called, compression started, patient regained pulse and BP which continue to decrease. Pt was intubated and transferred to ICU. Son in law was the bedside.

## 2022-10-18 NOTE — PROGRESS NOTES
Received pt alert and orientated x3-4. Pt needing 7L nc, sats in the 90s. Cardizem drip running at 5mg an hour. Afebrile. No c/o pain. Family at the bedside. Tolerating diet. Voiding. All meds given per [facility]. Family at the bedside. Fall precautions in place. Call light within reach.

## 2022-10-18 NOTE — PROGRESS NOTES
Patient to ICU post arrest.  Initial RRT was called but he continued to decline and went asystole. Per family request resuscitation measures were provided. He was intubated and ROSC was obtained after 2 rounds of CPR and 1 epi. Levophed gtt was started for hypotension and he was transferred to ICU. After arrival vasopressor needs continued to increase and Vasopressin was added. ABG was done and vent adjustments made. Ongoing care was discussed with wife and children at bedside given his desire for DNAR--as per documentation and POLST. High vasopressor support and ventilator support discussed. Patient also has not woken and followed commands--starting to bite on ETT and spontaneous move extremities. After further discussion amongst themselves they decided to withdraw support and focus on comfort. Orders placed for extubation and comfort focus.   [facility] on-call updated and agreed with plan    Ronaldo Mackey 3 NP  [place] [phone]: [phone]  Pgr: [phone]

## 2022-10-18 NOTE — PROGRESS NOTES
[facility] Pharmacy Note:  Renal Dose Adjustment    Dorothy Cortes has been prescribed famotidine (PEPCID) 20 mg intravenously every 12 hours. Estimated Creatinine Clearance: 39 mL/min (based on SCr of 1.12 mg/dL). Calculated creatinine clearance is < 50 ml/min, therefore the dose of famotidine (Pepcid) has been changed to 20 mg intravenously every 24 hours per P&T approved protocol. Pharmacy will continue to follow, and if renal function improves, will resume the original order.     Thank you,  Alden Gaitan, [facility]  10/18/2022 1:09 AM

## 2022-10-18 NOTE — PROGRESS NOTES
10/18/22 0035   Clinical Encounter Type   Visited With Family; Health care provider   Routine Visit Introduction   Continue Visiting Yes   Crisis Visit Critical care  (Responded to code blue)   Patient's Supportive Strategies/Resources Per wife, patient has strong Mormon jhonny and lots of family support. Pentecostal Encounters   Spiritual Requests During Visit / Hospitalization   (Left message for a  and awaiting return call back.)   Sacramental Encounters   Sacrament of Sick-Anointing Family requested anointing  (Left message for  and awaiting return call back)   Family Spiritual Encounters   Family Support During Treatment Consistently   Taxonomy   Intended Effects Convey a calming presence   Methods Offer emotional support;Collaborate with care team member   Interventions Acknowledge current situation; Active listening; Ask guided questions; Ask guided questions about jhonny;[place]; Facilitate communication between patient and/or family member and care team   Offered a compassionate listening presence to family. Listened as patient's wife provided information about recent health challenges and what they have experienced as a loving family. Wife is hopeful that patient will open his eyes since today is their 63rd wedding anniversary. Spiritual Care support can be requested via an Epic consult or at pager [phone] for more immediate requests.

## 2022-10-18 NOTE — DISCHARGE SUMMARY
General Medicine Discharge Summary     Patient ID:  Dunia Varma  81year old  3/11/1935    Admit date: 10/16/2022    Discharge date and time: 10/18/2022  3:11 AM     Attending Physician: Dusty Valdovinos MD    Primary Care Physician: Carlos Dallas MD     Reason for admission: Pleural effusion, hypoxia, afib with RVR    Discharge Diagnoses: Pleural effusion [J90]  Hypoxia [R09.02]  Atrial fibrillation with RVR ([address] [I48.91]  Non-small cell cancer of right lung [facility]) [C34.91]    Discharged Condition:     Hospital Course:   Mr. Ton Freeman is an 82 yo male with PMH of afib, lung cancer, multiple myeloma, prostate cancer, red cell aplasia who presented to the ER with SOB. Patient was started on IV antibiotics for possible PNA, although it was felt that patient's worsening hypoxemia was likely secondary to progression of his lung cancer. He was seen by oncology, pulmonary, cardiology as well as palliative care and hospice teams. Patient was considering transition to hospice, was discussing with family. Had discussed code status with patient as well as wife and two daughters at bedside upon admission, patient confirmed DNAR, but wasn't ready to decide about intubation as POLST indicated he would want this. Patient was noted to be bradycardic to low 30's, and then was found to be unresponsive and an RRT was called. Family at that time decided they wanted everything done and code was called, patient did obtain ROSC and was intubated and transferred to ICU. Family then decided to transition to comfort care and patient passed at 03:11 on 10/18/22.      Consults: IP CONSULT TO HOSPITALIST  IP CONSULT TO ONCOLOGY  IP CONSULT TO PULMONOLOGY  IP CONSULT PALLIATIVE CARE  IP CONSULT TO CARDIOLOGY  IP CONSULT TO HOSPICE  IP CONSULT TO PHARMACY - STRESS ULCER PROPHYLAXIS  IP CONSULT TO SPIRITUAL CARE    Operative Procedures:      Disposition:     Patient Instructions:   Discharge Medication List as of 10/18/2022  6:38 AM    CONTINUE these medications which have NOT CHANGED    amoxicillin clavulanate 875-125 MG Oral Tab  Take 1 tablet by mouth 2 (two) times daily. For 5 days. , Historical    CALCIUM OR  Take 1 tablet by mouth daily. , Historical    multivitamin Oral Tab  Take 1 tablet by mouth daily. , Historical    albuterol 108 (90 Base) MCG/ACT Inhalation Aero Soln  Inhale 2 puffs into the lungs every 4 (four) hours as needed for Wheezing or Shortness of Breath., Historical    Ascorbic Acid (VITAMIN C OR)  Take 1 tablet by mouth daily. , Historical    apixaban 2.5 MG Oral Tab  Take 2.5 mg by mouth 2 (two) times daily. , Historical    levothyroxine 50 MCG Oral Tab  Take 1 tablet (50 mcg total) by mouth daily. , Normal, Disp-90 tablet, R-0    FOLIC ACID OR  Take 1 tablet by mouth daily. , Historical    metoprolol tartrate 25 MG Oral Tab  Take 25 mg by mouth 2 (two) times daily. , Historical      I reconciled current and discharge medications on day of discharge      Total Time Coordinating Care: Greater than 30 minutes    Patient had opportunity to ask questions and state understand and agree with therapeutic plan as outlined above.      Thank Vannessa Owens MD

## 2022-10-18 NOTE — ANESTHESIA PROCEDURE NOTES
Airway  Date/Time: 10/17/2022 11:28 PM  Urgency: elective    Airway not difficult    General Information and Staff    Patient location during procedure: floor  Anesthesiologist: Lesley Allen MD  Performed: anesthesiologist     Indications and Patient Condition  Indications for airway management: cardiovascular instability and respiratory failure  Spontaneous Ventilation: absent  Sedation level: deep  Preoxygenated: yes  Patient position: sniffing  Mask difficulty assessment: 1 - vent by mask    Final Airway Details  Final airway type: endotracheal airway      Successful airway: ETT  Cuffed: yes   Successful intubation technique: Video laryngoscopy  Blade: GlideScope  Blade size: #3  ETT size (mm): 7.5    Cormack-Lehane Classification: grade I - full view of glottis  Placement verified by: chest auscultation and capnometry   Cuff volume (mL): 6  Measured from: teeth  ETT to teeth (cm): 23  Number of attempts at approach: 1

## 2022-10-18 NOTE — PROGRESS NOTES
Received patient from the floor after coding. Unresponsive to stimuli. See flowsheet for further assessment. Arrived intubated, RT placed him on the vent. ABG completed -- APN notified. Propofol added low dose for sedation. A fib. Received patient on levophed. Persistent hypotension after arrival -- see orders and MAR. Labs sent -- reviewed w/ APN. OG inserted, placed to LIS. NPO.    Bedrest.

## 2022-10-24 ENCOUNTER — TELEPHONE (OUTPATIENT)
Dept: HEMATOLOGY/ONCOLOGY | Facility: HOSPITAL | Age: 87
End: 2022-10-24

## 2022-10-24 NOTE — TELEPHONE ENCOUNTER
Mr. Doris Zamorano had passed in the hospital when I was away and was seen by my partners. Just wanted to touch base with Mrs. Johns and extend condolences. Went to [facility] and left message.

## (undated) DIAGNOSIS — C34.11 MALIGNANT NEOPLASM OF UPPER LOBE, RIGHT BRONCHUS OR LUNG (HCC): ICD-10-CM

## (undated) DIAGNOSIS — J18.8 OTHER PNEUMONIA, UNSPECIFIED ORGANISM: ICD-10-CM

## (undated) DIAGNOSIS — D64.9 ANEMIA, UNSPECIFIED TYPE: Primary | ICD-10-CM

## (undated) DIAGNOSIS — C90.01 MULTIPLE MYELOMA IN REMISSION (HCC): ICD-10-CM

## (undated) DIAGNOSIS — R69 ILLNESS, UNSPECIFIED: ICD-10-CM

## (undated) DIAGNOSIS — C90.00 MULTIPLE MYELOMA NOT HAVING ACHIEVED REMISSION (HCC): ICD-10-CM

## (undated) DEVICE — SINGLE USE SUCTION VALVE MAJ-209: Brand: SINGLE USE SUCTION VALVE (STERILE)

## (undated) DEVICE — REM POLYHESIVE ADULT PATIENT RETURN ELECTRODE: Brand: VALLEYLAB

## (undated) DEVICE — TRAP SPECIMEN MUCOUS 70 CUBIC CENTIMETER POLYURETHANE STERILE NOT MADE WITH NATURAL RUBBER LATEX MEDICHOICE: Brand: MEDICHOICE

## (undated) DEVICE — CONMED MULTI-PURPOSE SHEATHED CYTOLOGY BRUSH, RING HANDLE, 1.7 MM X 160 CM: Brand: CONMED

## (undated) DEVICE — SNARE CAPTI HEX STIFF SMALL

## (undated) DEVICE — ENDOSCOPY PACK UPPER: Brand: MEDLINE INDUSTRIES, INC.

## (undated) DEVICE — Device: Brand: DEFENDO AIR/WATER/SUCTION AND BIOPSY VALVE

## (undated) DEVICE — SINGLE USE BIOPSY VALVE MAJ-210: Brand: SINGLE USE BIOPSY VALVE (STERILE)

## (undated) DEVICE — GLOVE SURG SENSICARE SZ 8

## (undated) DEVICE — BOWLS UTILITY 16OZ

## (undated) DEVICE — NEEDLE ASP VIZISHOT 19G 2MM

## (undated) DEVICE — MEDI-VAC NON-CONDUCTIVE SUCTION TUBING: Brand: CARDINAL HEALTH

## (undated) DEVICE — KENDALL SCD EXPRESS SLEEVES, KNEE LENGTH, MEDIUM: Brand: KENDALL SCD

## (undated) DEVICE — GLOVE SURG SENSICARE SZ 7

## (undated) DEVICE — MEDI-VAC SUCTION HANDLE REGULAR CAPACITY: Brand: CARDINAL HEALTH

## (undated) DEVICE — MASK ISOLATION

## (undated) DEVICE — FILTERLINE NASAL ADULT O2/CO2

## (undated) DEVICE — 3M™ RED DOT™ MONITORING ELECTRODE WITH FOAM TAPE AND STICKY GEL, 50/BAG, 20/CASE, 72/PLT 2570: Brand: RED DOT™

## (undated) DEVICE — LENS PLASTIC DISP EYEWEAR

## (undated) DEVICE — SYRINGE 10ML SLIP TIP

## (undated) DEVICE — SINGLE USE ASPIRATION NEEDLE: Brand: SINGLE USE ASPIRATION NEEDLE

## (undated) DEVICE — 1200CC GUARDIAN II: Brand: GUARDIAN

## (undated) DEVICE — ORISE GEL

## (undated) DEVICE — FLUIDGARD® 160 ANTI-FOG SURGICAL MASK WITH ANTI-GLARE SHIELD: Brand: PRECEPT ®

## (undated) DEVICE — DURACLIP 16MM 235CM

## (undated) DEVICE — FORCEPS BX 100CM 1.8MM RJ STD

## (undated) DEVICE — ENDOSCOPY PACK - LOWER: Brand: MEDLINE INDUSTRIES, INC.

## (undated) DEVICE — 60 ML SYRINGE REGULAR TIP: Brand: MONOJECT

## (undated) DEVICE — NEEDLE CONTRAST INTERJECT 25G

## (undated) DEVICE — LENS FRAMES DISP EYEWEAR

## (undated) DEVICE — CAP SEALING, REVEAL 15.0MM

## (undated) NOTE — LETTER
[address] [facility] FOR BLOOD OR BLOOD COMPONENTS      In the course of your treatment, it may become necessary to administer a transfusion of blood or blood components.  This form provides basic information concerning this proc alternatives to you if it has not already been done. I,Brant Johns, have read/had read to me the above. I understand the matters bearing on the decision whether or not to authorize a transfusion of blood or blood components.  I have no questions which